# Patient Record
Sex: FEMALE | Race: WHITE | NOT HISPANIC OR LATINO | Employment: OTHER | URBAN - METROPOLITAN AREA
[De-identification: names, ages, dates, MRNs, and addresses within clinical notes are randomized per-mention and may not be internally consistent; named-entity substitution may affect disease eponyms.]

---

## 2024-09-21 ENCOUNTER — HOSPITAL ENCOUNTER (INPATIENT)
Facility: HOSPITAL | Age: 64
DRG: 065 | End: 2024-09-21
Attending: EMERGENCY MEDICINE | Admitting: INTERNAL MEDICINE
Payer: MEDICARE

## 2024-09-21 ENCOUNTER — APPOINTMENT (OUTPATIENT)
Dept: RADIOLOGY | Facility: HOSPITAL | Age: 64
DRG: 065 | End: 2024-09-21
Payer: MEDICARE

## 2024-09-21 ENCOUNTER — APPOINTMENT (OUTPATIENT)
Dept: CARDIOLOGY | Facility: HOSPITAL | Age: 64
DRG: 065 | End: 2024-09-21
Payer: MEDICARE

## 2024-09-21 DIAGNOSIS — G47.00 INSOMNIA, UNSPECIFIED TYPE: ICD-10-CM

## 2024-09-21 DIAGNOSIS — I10 HYPERTENSION, UNSPECIFIED TYPE: ICD-10-CM

## 2024-09-21 DIAGNOSIS — Z16.12 ESBL (EXTENDED SPECTRUM BETA-LACTAMASE) PRODUCING BACTERIA INFECTION: ICD-10-CM

## 2024-09-21 DIAGNOSIS — I63.429 CEREBROVASCULAR ACCIDENT (CVA) DUE TO EMBOLISM OF ANTERIOR CEREBRAL ARTERY, UNSPECIFIED BLOOD VESSEL LATERALITY (MULTI): ICD-10-CM

## 2024-09-21 DIAGNOSIS — N39.0 ACUTE LOWER URINARY TRACT INFECTION: ICD-10-CM

## 2024-09-21 DIAGNOSIS — I63.9 CEREBROVASCULAR ACCIDENT (CVA), UNSPECIFIED MECHANISM (MULTI): Primary | ICD-10-CM

## 2024-09-21 DIAGNOSIS — A49.9 ESBL (EXTENDED SPECTRUM BETA-LACTAMASE) PRODUCING BACTERIA INFECTION: ICD-10-CM

## 2024-09-21 DIAGNOSIS — I67.841 REVERSIBLE CEREBROVASCULAR VASOCONSTRICTION SYNDROME: ICD-10-CM

## 2024-09-21 DIAGNOSIS — E85.89 OTHER AMYLOIDOSIS (MULTI): ICD-10-CM

## 2024-09-21 PROBLEM — T45.1X5A CHEMOTHERAPY-INDUCED DIARRHEA: Status: ACTIVE | Noted: 2023-09-14

## 2024-09-21 PROBLEM — R60.0 BILATERAL LOWER EXTREMITY EDEMA: Status: ACTIVE | Noted: 2023-09-26

## 2024-09-21 PROBLEM — Z86.79 HISTORY OF HYPERTENSION: Status: ACTIVE | Noted: 2022-08-24

## 2024-09-21 PROBLEM — E53.8 B12 DEFICIENCY: Status: ACTIVE | Noted: 2022-03-04

## 2024-09-21 PROBLEM — R63.0 ANOREXIA: Status: ACTIVE | Noted: 2022-08-24

## 2024-09-21 PROBLEM — Z98.84 HISTORY OF GASTRIC BYPASS: Status: ACTIVE | Noted: 2022-02-24

## 2024-09-21 PROBLEM — N18.4 STAGE 4 CHRONIC KIDNEY DISEASE (MULTI): Status: ACTIVE | Noted: 2024-09-21

## 2024-09-21 PROBLEM — R62.7 FAILURE TO THRIVE IN ADULT: Status: ACTIVE | Noted: 2022-08-24

## 2024-09-21 PROBLEM — B25.9 CYTOMEGALOVIRUS (CMV) VIREMIA (MULTI): Status: ACTIVE | Noted: 2023-07-28

## 2024-09-21 PROBLEM — R19.7 DIARRHEA: Status: ACTIVE | Noted: 2022-07-19

## 2024-09-21 PROBLEM — F43.21 GRIEF: Status: ACTIVE | Noted: 2024-01-18

## 2024-09-21 PROBLEM — D84.9 IMMUNODEFICIENCY (MULTI): Status: ACTIVE | Noted: 2023-07-20

## 2024-09-21 PROBLEM — M10.9 GOUT: Status: ACTIVE | Noted: 2022-02-24

## 2024-09-21 PROBLEM — D75.89 BICYTOPENIA: Status: ACTIVE | Noted: 2023-06-21

## 2024-09-21 PROBLEM — R06.00 DYSPNEA: Status: ACTIVE | Noted: 2022-05-12

## 2024-09-21 PROBLEM — K52.9 COLITIS: Status: ACTIVE | Noted: 2023-06-30

## 2024-09-21 PROBLEM — R26.9 GAIT ABNORMALITY: Status: ACTIVE | Noted: 2024-03-14

## 2024-09-21 PROBLEM — I50.32 CHRONIC DIASTOLIC HEART FAILURE: Status: ACTIVE | Noted: 2022-12-28

## 2024-09-21 PROBLEM — Z94.81 AUTOLOGOUS BONE MARROW TRANSPLANTATION STATUS: Status: ACTIVE | Noted: 2023-06-21

## 2024-09-21 PROBLEM — I42.9 CARDIOMYOPATHY: Status: ACTIVE | Noted: 2023-02-27

## 2024-09-21 PROBLEM — K52.1 CHEMOTHERAPY-INDUCED DIARRHEA: Status: ACTIVE | Noted: 2023-09-14

## 2024-09-21 PROBLEM — R41.0 CONFUSION: Status: ACTIVE | Noted: 2022-08-24

## 2024-09-21 PROBLEM — Z85.79 HISTORY OF MULTIPLE MYELOMA: Status: ACTIVE | Noted: 2022-08-24

## 2024-09-21 PROBLEM — F41.9 ANXIETY: Status: ACTIVE | Noted: 2024-09-21

## 2024-09-21 PROBLEM — F32.9 MAJOR DEPRESSIVE DISORDER: Status: ACTIVE | Noted: 2024-09-21

## 2024-09-21 PROBLEM — M79.662 BILATERAL CALF PAIN: Status: ACTIVE | Noted: 2022-05-13

## 2024-09-21 PROBLEM — C90.00 MULTIPLE MYELOMA NOT HAVING ACHIEVED REMISSION (MULTI): Status: ACTIVE | Noted: 2024-09-21

## 2024-09-21 PROBLEM — M79.661 BILATERAL CALF PAIN: Status: ACTIVE | Noted: 2022-05-13

## 2024-09-21 PROBLEM — Z86.19 HISTORY OF CLOSTRIDIOIDES DIFFICILE COLITIS: Status: ACTIVE | Noted: 2023-11-25

## 2024-09-21 PROBLEM — D64.89 ANEMIA DUE TO MULTIPLE MECHANISMS: Status: ACTIVE | Noted: 2023-09-13

## 2024-09-21 PROBLEM — M79.7 FIBROMYALGIA: Status: ACTIVE | Noted: 2024-09-21

## 2024-09-21 LAB
ALBUMIN SERPL BCP-MCNC: 3.5 G/DL (ref 3.4–5)
ALP SERPL-CCNC: 52 U/L (ref 33–136)
ALT SERPL W P-5'-P-CCNC: 12 U/L (ref 7–45)
ANION GAP SERPL CALCULATED.3IONS-SCNC: 12 MMOL/L (ref 10–20)
APPEARANCE UR: CLEAR
AST SERPL W P-5'-P-CCNC: 16 U/L (ref 9–39)
BASOPHILS # BLD AUTO: 0.03 X10*3/UL (ref 0–0.1)
BASOPHILS NFR BLD AUTO: 0.7 %
BILIRUB SERPL-MCNC: 0.4 MG/DL (ref 0–1.2)
BILIRUB UR STRIP.AUTO-MCNC: NEGATIVE MG/DL
BNP SERPL-MCNC: 98 PG/ML (ref 0–99)
BUN SERPL-MCNC: 15 MG/DL (ref 6–23)
CALCIUM SERPL-MCNC: 8.2 MG/DL (ref 8.6–10.3)
CARDIAC TROPONIN I PNL SERPL HS: 4 NG/L (ref 0–13)
CARDIAC TROPONIN I PNL SERPL HS: 4 NG/L (ref 0–13)
CHLORIDE SERPL-SCNC: 107 MMOL/L (ref 98–107)
CHOLEST SERPL-MCNC: 175 MG/DL (ref 0–199)
CHOLESTEROL/HDL RATIO: 3.3
CO2 SERPL-SCNC: 19 MMOL/L (ref 21–32)
COLOR UR: ABNORMAL
CREAT SERPL-MCNC: 2.05 MG/DL (ref 0.5–1.05)
EGFRCR SERPLBLD CKD-EPI 2021: 26 ML/MIN/1.73M*2
EOSINOPHIL # BLD AUTO: 0.02 X10*3/UL (ref 0–0.7)
EOSINOPHIL NFR BLD AUTO: 0.5 %
ERYTHROCYTE [DISTWIDTH] IN BLOOD BY AUTOMATED COUNT: 16.9 % (ref 11.5–14.5)
GLUCOSE BLD MANUAL STRIP-MCNC: 81 MG/DL (ref 74–99)
GLUCOSE SERPL-MCNC: 82 MG/DL (ref 74–99)
GLUCOSE UR STRIP.AUTO-MCNC: NORMAL MG/DL
HCT VFR BLD AUTO: 31.8 % (ref 36–46)
HDLC SERPL-MCNC: 52.6 MG/DL
HGB BLD-MCNC: 10.1 G/DL (ref 12–16)
IMM GRANULOCYTES # BLD AUTO: 0.01 X10*3/UL (ref 0–0.7)
IMM GRANULOCYTES NFR BLD AUTO: 0.2 % (ref 0–0.9)
INR PPP: 1.1 (ref 0.9–1.2)
KETONES UR STRIP.AUTO-MCNC: NEGATIVE MG/DL
LDLC SERPL CALC-MCNC: 98 MG/DL
LEUKOCYTE ESTERASE UR QL STRIP.AUTO: ABNORMAL
LYMPHOCYTES # BLD AUTO: 1.81 X10*3/UL (ref 1.2–4.8)
LYMPHOCYTES NFR BLD AUTO: 43.2 %
MCH RBC QN AUTO: 32 PG (ref 26–34)
MCHC RBC AUTO-ENTMCNC: 31.8 G/DL (ref 32–36)
MCV RBC AUTO: 101 FL (ref 80–100)
MONOCYTES # BLD AUTO: 0.44 X10*3/UL (ref 0.1–1)
MONOCYTES NFR BLD AUTO: 10.5 %
MUCOUS THREADS #/AREA URNS AUTO: ABNORMAL /LPF
NEUTROPHILS # BLD AUTO: 1.88 X10*3/UL (ref 1.2–7.7)
NEUTROPHILS NFR BLD AUTO: 44.9 %
NITRITE UR QL STRIP.AUTO: ABNORMAL
NON HDL CHOLESTEROL: 122 MG/DL (ref 0–149)
NRBC BLD-RTO: 0 /100 WBCS (ref 0–0)
PH UR STRIP.AUTO: 6.5 [PH]
PLATELET # BLD AUTO: 94 X10*3/UL (ref 150–450)
POTASSIUM SERPL-SCNC: 4.1 MMOL/L (ref 3.5–5.3)
PROT SERPL-MCNC: 5.9 G/DL (ref 6.4–8.2)
PROT UR STRIP.AUTO-MCNC: ABNORMAL MG/DL
PROTHROMBIN TIME: 11.3 SECONDS (ref 9.3–12.7)
RBC # BLD AUTO: 3.16 X10*6/UL (ref 4–5.2)
RBC # UR STRIP.AUTO: NEGATIVE /UL
RBC #/AREA URNS AUTO: ABNORMAL /HPF
RBC MORPH BLD: NORMAL
SODIUM SERPL-SCNC: 134 MMOL/L (ref 136–145)
SP GR UR STRIP.AUTO: 1.04
TRIGL SERPL-MCNC: 121 MG/DL (ref 0–149)
UROBILINOGEN UR STRIP.AUTO-MCNC: NORMAL MG/DL
VLDL: 24 MG/DL (ref 0–40)
WBC # BLD AUTO: 4.2 X10*3/UL (ref 4.4–11.3)
WBC #/AREA URNS AUTO: ABNORMAL /HPF
WBC CLUMPS #/AREA URNS AUTO: ABNORMAL /HPF

## 2024-09-21 PROCEDURE — 70450 CT HEAD/BRAIN W/O DYE: CPT | Performed by: RADIOLOGY

## 2024-09-21 PROCEDURE — 83718 ASSAY OF LIPOPROTEIN: CPT | Performed by: NURSE PRACTITIONER

## 2024-09-21 PROCEDURE — 2500000004 HC RX 250 GENERAL PHARMACY W/ HCPCS (ALT 636 FOR OP/ED): Performed by: EMERGENCY MEDICINE

## 2024-09-21 PROCEDURE — 99291 CRITICAL CARE FIRST HOUR: CPT

## 2024-09-21 PROCEDURE — 70450 CT HEAD/BRAIN W/O DYE: CPT | Performed by: STUDENT IN AN ORGANIZED HEALTH CARE EDUCATION/TRAINING PROGRAM

## 2024-09-21 PROCEDURE — 71045 X-RAY EXAM CHEST 1 VIEW: CPT | Performed by: RADIOLOGY

## 2024-09-21 PROCEDURE — 93005 ELECTROCARDIOGRAM TRACING: CPT

## 2024-09-21 PROCEDURE — 82947 ASSAY GLUCOSE BLOOD QUANT: CPT

## 2024-09-21 PROCEDURE — 96365 THER/PROPH/DIAG IV INF INIT: CPT | Mod: 59

## 2024-09-21 PROCEDURE — 70496 CT ANGIOGRAPHY HEAD: CPT | Performed by: RADIOLOGY

## 2024-09-21 PROCEDURE — 70498 CT ANGIOGRAPHY NECK: CPT | Performed by: RADIOLOGY

## 2024-09-21 PROCEDURE — 93010 ELECTROCARDIOGRAM REPORT: CPT | Performed by: INTERNAL MEDICINE

## 2024-09-21 PROCEDURE — 2060000001 HC INTERMEDIATE ICU ROOM DAILY

## 2024-09-21 PROCEDURE — 2550000001 HC RX 255 CONTRASTS: Performed by: EMERGENCY MEDICINE

## 2024-09-21 PROCEDURE — 81001 URINALYSIS AUTO W/SCOPE: CPT | Performed by: EMERGENCY MEDICINE

## 2024-09-21 PROCEDURE — 80053 COMPREHEN METABOLIC PANEL: CPT | Performed by: EMERGENCY MEDICINE

## 2024-09-21 PROCEDURE — 87086 URINE CULTURE/COLONY COUNT: CPT | Mod: TRILAB,WESLAB | Performed by: EMERGENCY MEDICINE

## 2024-09-21 PROCEDURE — 2500000004 HC RX 250 GENERAL PHARMACY W/ HCPCS (ALT 636 FOR OP/ED): Performed by: NURSE PRACTITIONER

## 2024-09-21 PROCEDURE — 70496 CT ANGIOGRAPHY HEAD: CPT

## 2024-09-21 PROCEDURE — 36415 COLL VENOUS BLD VENIPUNCTURE: CPT | Performed by: EMERGENCY MEDICINE

## 2024-09-21 PROCEDURE — 84484 ASSAY OF TROPONIN QUANT: CPT | Performed by: EMERGENCY MEDICINE

## 2024-09-21 PROCEDURE — 85610 PROTHROMBIN TIME: CPT | Performed by: EMERGENCY MEDICINE

## 2024-09-21 PROCEDURE — 85025 COMPLETE CBC W/AUTO DIFF WBC: CPT | Performed by: EMERGENCY MEDICINE

## 2024-09-21 PROCEDURE — 71045 X-RAY EXAM CHEST 1 VIEW: CPT

## 2024-09-21 PROCEDURE — 2500000001 HC RX 250 WO HCPCS SELF ADMINISTERED DRUGS (ALT 637 FOR MEDICARE OP): Performed by: EMERGENCY MEDICINE

## 2024-09-21 PROCEDURE — 70450 CT HEAD/BRAIN W/O DYE: CPT

## 2024-09-21 PROCEDURE — 96360 HYDRATION IV INFUSION INIT: CPT

## 2024-09-21 PROCEDURE — 83880 ASSAY OF NATRIURETIC PEPTIDE: CPT | Performed by: EMERGENCY MEDICINE

## 2024-09-21 RX ORDER — TRAZODONE HYDROCHLORIDE 100 MG/1
1 TABLET ORAL NIGHTLY
Status: ON HOLD | COMMUNITY
Start: 2024-09-09 | End: 2024-12-08

## 2024-09-21 RX ORDER — SODIUM BICARBONATE 650 MG/1
650 TABLET ORAL 2 TIMES DAILY
Status: ON HOLD | COMMUNITY
Start: 2024-08-27

## 2024-09-21 RX ORDER — HYDRALAZINE HYDROCHLORIDE 25 MG/1
25 TABLET, FILM COATED ORAL EVERY 6 HOURS PRN
Status: DISCONTINUED | OUTPATIENT
Start: 2024-09-23 | End: 2024-09-25 | Stop reason: HOSPADM

## 2024-09-21 RX ORDER — ATORVASTATIN CALCIUM 40 MG/1
40 TABLET, FILM COATED ORAL DAILY
Status: DISCONTINUED | OUTPATIENT
Start: 2024-09-21 | End: 2024-09-25 | Stop reason: HOSPADM

## 2024-09-21 RX ORDER — CEFTRIAXONE 1 G/50ML
1 INJECTION, SOLUTION INTRAVENOUS ONCE
Status: COMPLETED | OUTPATIENT
Start: 2024-09-21 | End: 2024-09-21

## 2024-09-21 RX ORDER — CALCIUM CARBONATE 260MG(650)
1 TABLET,CHEWABLE ORAL
Status: ON HOLD | COMMUNITY
Start: 2024-05-28

## 2024-09-21 RX ORDER — TRAZODONE HYDROCHLORIDE 100 MG/1
100 TABLET ORAL NIGHTLY
Status: DISCONTINUED | OUTPATIENT
Start: 2024-09-21 | End: 2024-09-25 | Stop reason: HOSPADM

## 2024-09-21 RX ORDER — HEPARIN SODIUM 5000 [USP'U]/ML
5000 INJECTION, SOLUTION INTRAVENOUS; SUBCUTANEOUS EVERY 8 HOURS
Status: DISCONTINUED | OUTPATIENT
Start: 2024-09-21 | End: 2024-09-21

## 2024-09-21 RX ORDER — CHOLECALCIFEROL (VITAMIN D3) 50 MCG
2000 TABLET ORAL
Status: DISCONTINUED | OUTPATIENT
Start: 2024-09-21 | End: 2024-09-25 | Stop reason: HOSPADM

## 2024-09-21 RX ORDER — LOPERAMIDE HYDROCHLORIDE 2 MG/1
2 CAPSULE ORAL 3 TIMES DAILY PRN
Status: DISCONTINUED | OUTPATIENT
Start: 2024-09-21 | End: 2024-09-22

## 2024-09-21 RX ORDER — ASPIRIN 325 MG
325 TABLET ORAL ONCE
Status: COMPLETED | OUTPATIENT
Start: 2024-09-21 | End: 2024-09-21

## 2024-09-21 RX ORDER — SERTRALINE HYDROCHLORIDE 100 MG/1
100 TABLET, FILM COATED ORAL
Status: DISCONTINUED | OUTPATIENT
Start: 2024-09-21 | End: 2024-09-25 | Stop reason: HOSPADM

## 2024-09-21 RX ORDER — OLANZAPINE 2.5 MG/1
2.5 TABLET ORAL NIGHTLY
Status: DISCONTINUED | OUTPATIENT
Start: 2024-09-21 | End: 2024-09-25 | Stop reason: HOSPADM

## 2024-09-21 RX ORDER — ACYCLOVIR 200 MG/1
200 CAPSULE ORAL 2 TIMES DAILY
Status: DISCONTINUED | OUTPATIENT
Start: 2024-09-21 | End: 2024-09-25 | Stop reason: HOSPADM

## 2024-09-21 RX ORDER — LABETALOL HYDROCHLORIDE 5 MG/ML
10 INJECTION, SOLUTION INTRAVENOUS EVERY 10 MIN PRN
Status: ACTIVE | OUTPATIENT
Start: 2024-09-21 | End: 2024-09-23

## 2024-09-21 RX ORDER — SERTRALINE HYDROCHLORIDE 100 MG/1
100 TABLET, FILM COATED ORAL
Status: ON HOLD | COMMUNITY
Start: 2024-09-20 | End: 2025-09-20

## 2024-09-21 RX ORDER — ACYCLOVIR 200 MG/1
200 CAPSULE ORAL 2 TIMES DAILY
Status: ON HOLD | COMMUNITY
Start: 2024-09-03

## 2024-09-21 RX ORDER — LANOLIN ALCOHOL/MO/W.PET/CERES
1000 CREAM (GRAM) TOPICAL
Status: ON HOLD | COMMUNITY
Start: 2024-07-18

## 2024-09-21 RX ORDER — BUPROPION HYDROCHLORIDE 200 MG/1
200 TABLET, EXTENDED RELEASE ORAL
Status: ON HOLD | COMMUNITY
Start: 2024-09-20 | End: 2025-09-15

## 2024-09-21 RX ORDER — LANOLIN ALCOHOL/MO/W.PET/CERES
1000 CREAM (GRAM) TOPICAL
Status: DISCONTINUED | OUTPATIENT
Start: 2024-09-21 | End: 2024-09-25 | Stop reason: HOSPADM

## 2024-09-21 RX ORDER — SODIUM BICARBONATE 650 MG/1
650 TABLET ORAL 2 TIMES DAILY
Status: DISCONTINUED | OUTPATIENT
Start: 2024-09-21 | End: 2024-09-25 | Stop reason: HOSPADM

## 2024-09-21 RX ORDER — CALCIUM CARBONATE 260MG(650)
260 TABLET,CHEWABLE ORAL
Status: DISCONTINUED | OUTPATIENT
Start: 2024-09-21 | End: 2024-09-21

## 2024-09-21 RX ORDER — LOPERAMIDE HCL 1MG/7.5ML
1 LIQUID (ML) ORAL
Status: ON HOLD | COMMUNITY

## 2024-09-21 RX ORDER — OLANZAPINE 5 MG/1
2.5 TABLET ORAL
Status: ON HOLD | COMMUNITY
Start: 2024-09-16

## 2024-09-21 RX ORDER — SODIUM CHLORIDE 9 MG/ML
100 INJECTION, SOLUTION INTRAVENOUS CONTINUOUS
Status: DISCONTINUED | OUTPATIENT
Start: 2024-09-21 | End: 2024-09-22

## 2024-09-21 RX ORDER — BUPROPION HYDROCHLORIDE 100 MG/1
200 TABLET, EXTENDED RELEASE ORAL DAILY
Status: DISCONTINUED | OUTPATIENT
Start: 2024-09-21 | End: 2024-09-25 | Stop reason: HOSPADM

## 2024-09-21 RX ORDER — ASPIRIN 81 MG/1
81 TABLET ORAL DAILY
Status: DISCONTINUED | OUTPATIENT
Start: 2024-09-22 | End: 2024-09-25 | Stop reason: HOSPADM

## 2024-09-21 RX ORDER — VIT C/E/ZN/COPPR/LUTEIN/ZEAXAN 250MG-90MG
2000 CAPSULE ORAL
Status: ON HOLD | COMMUNITY
Start: 2024-06-20

## 2024-09-21 RX ORDER — HYDRALAZINE HYDROCHLORIDE 20 MG/ML
10 INJECTION INTRAMUSCULAR; INTRAVENOUS
Status: ACTIVE | OUTPATIENT
Start: 2024-09-21 | End: 2024-09-23

## 2024-09-21 RX ADMIN — IOHEXOL 75 ML: 350 INJECTION, SOLUTION INTRAVENOUS at 10:54

## 2024-09-21 RX ADMIN — SODIUM CHLORIDE 100 ML/HR: 900 INJECTION, SOLUTION INTRAVENOUS at 16:17

## 2024-09-21 RX ADMIN — SODIUM CHLORIDE 500 ML: 9 INJECTION, SOLUTION INTRAVENOUS at 10:39

## 2024-09-21 RX ADMIN — ASPIRIN 325 MG ORAL TABLET 325 MG: 325 PILL ORAL at 10:41

## 2024-09-21 RX ADMIN — CEFTRIAXONE SODIUM 1 G: 1 INJECTION, SOLUTION INTRAVENOUS at 13:15

## 2024-09-21 SDOH — SOCIAL STABILITY: SOCIAL INSECURITY: ARE THERE ANY APPARENT SIGNS OF INJURIES/BEHAVIORS THAT COULD BE RELATED TO ABUSE/NEGLECT?: NO

## 2024-09-21 SDOH — SOCIAL STABILITY: SOCIAL INSECURITY: HAVE YOU HAD ANY THOUGHTS OF HARMING ANYONE ELSE?: NO

## 2024-09-21 SDOH — SOCIAL STABILITY: SOCIAL INSECURITY: HAVE YOU HAD THOUGHTS OF HARMING ANYONE ELSE?: NO

## 2024-09-21 SDOH — SOCIAL STABILITY: SOCIAL INSECURITY: DOES ANYONE TRY TO KEEP YOU FROM HAVING/CONTACTING OTHER FRIENDS OR DOING THINGS OUTSIDE YOUR HOME?: NO

## 2024-09-21 SDOH — SOCIAL STABILITY: SOCIAL INSECURITY: ARE YOU OR HAVE YOU BEEN THREATENED OR ABUSED PHYSICALLY, EMOTIONALLY, OR SEXUALLY BY ANYONE?: NO

## 2024-09-21 SDOH — SOCIAL STABILITY: SOCIAL INSECURITY: DO YOU FEEL UNSAFE GOING BACK TO THE PLACE WHERE YOU ARE LIVING?: NO

## 2024-09-21 SDOH — SOCIAL STABILITY: SOCIAL INSECURITY: DO YOU FEEL ANYONE HAS EXPLOITED OR TAKEN ADVANTAGE OF YOU FINANCIALLY OR OF YOUR PERSONAL PROPERTY?: NO

## 2024-09-21 SDOH — SOCIAL STABILITY: SOCIAL INSECURITY: HAS ANYONE EVER THREATENED TO HURT YOUR FAMILY OR YOUR PETS?: NO

## 2024-09-21 SDOH — SOCIAL STABILITY: SOCIAL INSECURITY: ABUSE: ADULT

## 2024-09-21 ASSESSMENT — ACTIVITIES OF DAILY LIVING (ADL)
HEARING - LEFT EAR: FUNCTIONAL
DRESSING YOURSELF: NEEDS ASSISTANCE
ADEQUATE_TO_COMPLETE_ADL: YES
GROOMING: NEEDS ASSISTANCE
HEARING - RIGHT EAR: FUNCTIONAL
ADEQUATE_TO_COMPLETE_ADL: YES
WALKS IN HOME: INDEPENDENT
PATIENT'S MEMORY ADEQUATE TO SAFELY COMPLETE DAILY ACTIVITIES?: YES
TOILETING: NEEDS ASSISTANCE
LACK_OF_TRANSPORTATION: NO
BATHING: NEEDS ASSISTANCE
JUDGMENT_ADEQUATE_SAFELY_COMPLETE_DAILY_ACTIVITIES: YES
JUDGMENT_ADEQUATE_SAFELY_COMPLETE_DAILY_ACTIVITIES: YES
PATIENT'S MEMORY ADEQUATE TO SAFELY COMPLETE DAILY ACTIVITIES?: YES
FEEDING YOURSELF: INDEPENDENT

## 2024-09-21 ASSESSMENT — PATIENT HEALTH QUESTIONNAIRE - PHQ9
2. FEELING DOWN, DEPRESSED OR HOPELESS: NOT AT ALL
SUM OF ALL RESPONSES TO PHQ9 QUESTIONS 1 & 2: 0
1. LITTLE INTEREST OR PLEASURE IN DOING THINGS: NOT AT ALL

## 2024-09-21 ASSESSMENT — PAIN SCALES - GENERAL
PAINLEVEL_OUTOF10: 0 - NO PAIN
PAINLEVEL_OUTOF10: 0 - NO PAIN

## 2024-09-21 ASSESSMENT — COGNITIVE AND FUNCTIONAL STATUS - GENERAL
TURNING FROM BACK TO SIDE WHILE IN FLAT BAD: A LOT
MOVING FROM LYING ON BACK TO SITTING ON SIDE OF FLAT BED WITH BEDRAILS: A LITTLE
DAILY ACTIVITIY SCORE: 14
MOVING FROM LYING ON BACK TO SITTING ON SIDE OF FLAT BED WITH BEDRAILS: A LITTLE
DRESSING REGULAR LOWER BODY CLOTHING: A LOT
DRESSING REGULAR UPPER BODY CLOTHING: A LOT
CLIMB 3 TO 5 STEPS WITH RAILING: TOTAL
PATIENT BASELINE BEDBOUND: NO
MOBILITY SCORE: 11
TOILETING: A LITTLE
TURNING FROM BACK TO SIDE WHILE IN FLAT BAD: A LOT
PERSONAL GROOMING: A LOT
DRESSING REGULAR LOWER BODY CLOTHING: A LOT
STANDING UP FROM CHAIR USING ARMS: A LOT
MOVING TO AND FROM BED TO CHAIR: A LOT
WALKING IN HOSPITAL ROOM: TOTAL
HELP NEEDED FOR BATHING: A LITTLE
PERSONAL GROOMING: A LOT
WALKING IN HOSPITAL ROOM: TOTAL
TOILETING: A LITTLE
DRESSING REGULAR UPPER BODY CLOTHING: A LOT
MOBILITY SCORE: 11
STANDING UP FROM CHAIR USING ARMS: A LOT
DAILY ACTIVITIY SCORE: 13
CLIMB 3 TO 5 STEPS WITH RAILING: TOTAL
MOVING TO AND FROM BED TO CHAIR: A LOT
EATING MEALS: A LOT
EATING MEALS: A LOT
HELP NEEDED FOR BATHING: A LOT

## 2024-09-21 ASSESSMENT — LIFESTYLE VARIABLES
HOW OFTEN DO YOU HAVE A DRINK CONTAINING ALCOHOL: NEVER
HOW OFTEN DO YOU HAVE 6 OR MORE DRINKS ON ONE OCCASION: NEVER
HOW MANY STANDARD DRINKS CONTAINING ALCOHOL DO YOU HAVE ON A TYPICAL DAY: PATIENT DOES NOT DRINK
AUDIT-C TOTAL SCORE: 0
AUDIT-C TOTAL SCORE: 0
SKIP TO QUESTIONS 9-10: 1

## 2024-09-21 ASSESSMENT — PAIN - FUNCTIONAL ASSESSMENT: PAIN_FUNCTIONAL_ASSESSMENT: 0-10

## 2024-09-21 ASSESSMENT — PAIN SCALES - WONG BAKER: WONGBAKER_NUMERICALRESPONSE: NO HURT

## 2024-09-21 ASSESSMENT — COLUMBIA-SUICIDE SEVERITY RATING SCALE - C-SSRS
2. HAVE YOU ACTUALLY HAD ANY THOUGHTS OF KILLING YOURSELF?: NO
6. HAVE YOU EVER DONE ANYTHING, STARTED TO DO ANYTHING, OR PREPARED TO DO ANYTHING TO END YOUR LIFE?: NO
1. IN THE PAST MONTH, HAVE YOU WISHED YOU WERE DEAD OR WISHED YOU COULD GO TO SLEEP AND NOT WAKE UP?: NO

## 2024-09-21 NOTE — PROGRESS NOTES
HPI:  64 y.o. female with right sided weakness. woke up 0800 and noticed right sided weakness and had trouble getting out of bed. EMS called and brought to the ED.     Last known Well: 2100  NIH Stroke Scale Reported: 5 (moderate right face, arm and leg weakness with mild dysarthria)     Prior Functional Status (Modified Danika Scale):  0 No symptoms at all     Imaging Results:  Head CT: no acute findings   Head/Neck CTA/P: no LVO     Assessment:   Working Diagnosis:   Ischemic Stroke       Recommendations:   IV tPA is not recommended. Rationale: outside of wake up thrombolysis protocol time window as she is greater than 12 hours from LKN     Patient is a potential candidate of endovascular treatment      Additional Recommendations:  Activate wake up protocol for MRI     Consult completed by:  TELEPHONE communication was used to provide this telehealth service.  Time includes consultation with ED provider and extensive review of data- history, medical records, test results, and neuroimaging studies: 11 - 20 mins was spent in consultation

## 2024-09-21 NOTE — CARE PLAN
The patient's goals for the shift include      The clinical goals for the shift include monitor NIH

## 2024-09-21 NOTE — PROGRESS NOTES
Attestation/Supervisory note for IMELDA Vasquez      The patient is a 64-year-old female presenting to the emergency department for evaluation of a possible stroke.  The patient states that she went to bed sometime around 2100 last night and did not have any symptoms.  She states that she woke up sometime around 0800 this morning and had slurred speech and right-sided weakness.  She was not able to walk or stand.  She states that she waited for a while to see if it would go away but it did not so she contacted her daughter and EMS was called.  She states that she does not use any blood thinners.  She denies any headache or visual changes.  No difficulty swallowing.  No chest pain or shortness of breath.  No abdominal pain.  No nausea or vomiting.  No diarrhea or constipation.  No urinary complaints.  No history of previous strokes.  She states that she thinks that she was told something was wrong with her heart in the past but she states it was not atrial fibrillation, atrial flutter, or a heart attack.  She does not have any history of PE or DVT.  She denies having any chronic medical conditions.  She does not smoke.  All pertinent positives and negatives are recorded above.  All other systems reviewed and otherwise negative.  Vital signs within normal limits.  Physical exam with a well-nourished well-developed female in no acute distress.  HEENT exam with slight right-sided facial droop.  She is able to converse without difficulty other than a slight slur of her speech.  She has no evidence of respiratory distress.  Abdominal exam is benign.  She does have a right-sided upper and lower extremity weakness.  Her NIH stroke scale score is 5 at this time      Code brain attack activated      tPA/TNK is not indicated at this time, even using the wake-up protocol, due to the patient's last known well time of eating more than 12 hours prior to arrival.  The patient did have an NIH stroke scale score of 5 and was within the 4.5  hours of onset time using the wake-up protocol but had a last known well time of more than 12 hours prior to arrival.      EKG with normal sinus rhythm at 71 bpm, normal axis, normal voltage, normal ST segment, normal T waves      Oral aspirin and IV fluids ordered      Diagnostic labs with evidence of urinary tract infection, mild thrombocytopenia, electrolyte imbalance, mild renal insufficiency, otherwise unremarkable.      Initial troponin 4.  Repeat trop T 4      IV Rocephin was ordered.      CT head wo IV contrast   Final Result   1. Interval evidence of hyperdensity in the falx cerebri and in the   intracranial vasculature as described above is most likely favored to   be related to recent intravenous contrast administered during the CT   angiogram head performed on the same day. Within this limitation,   there is no evidence of intracranial hemorrhage or mass effect or   midline shift.   2. No definite CT evidence of territorial loss of gray-white   differentiation to suggest acute infarction. However an MRI can be   considered for definitive evaluation.             MACRO:   None             Signed by: Blossom Griffith 9/21/2024 12:50 PM   Dictation workstation:   [id number]      XR chest 1 view   Final Result   1. Mild pulmonary vascular congestion without acute abnormality                  MACRO:   None        Signed by: Eric Tiwari 9/21/2024 11:36 AM   Dictation workstation:   [id number]      CT angio head and neck w and wo IV contrast   Final Result   * Normal exam        MACRO:   Murphy Tirado discussed the significance and urgency of this   critical finding by telephone with  DORENE PALACIO on 9/21/2024 at 11:18   am.  (**-RCF-**) Findings:  See findings.             Signed by: Murphy Tirado 9/21/2024 11:18 AM   Dictation workstation:   [id number]      CT brain attack head wo IV contrast   Final Result   1. No acute intracranial abnormality        2. Remote lacunar infarcts in the right  caudate head and left   thalamus.             MACRO:   Reynaldo Crowley discussed the significance and urgency of this   critical finding by telephone with  DORENE PALACIO on 9/21/2024 at 10:57   am.  (**-RCF-**) Findings:  See findings.             Signed by: Reynaldo Crowley 9/21/2024 10:57 AM   Dictation workstation:   [id number]           The patient does not have any evidence of hemodynamic instability.  She does not have any evidence of airway compromise or respiratory distress.  She does have evidence of urinary tract infection but she is well-perfused and there is low suspicion for sepsis.  IV antibiotics were ordered.  EKG and cardiac enzymes do not show any evidence of STEMI.  Chest x-ray with some mild pulmonary congestion concerning for CHF but otherwise unremarkable.  She did not have any widening of the mediastinum.  Her pulses were equal bilaterally.  No evidence of pneumonia or pneumothorax.  The patient did have an NIH stroke scale score of 5 upon arrival.  A code brain attack was activated.  CT head showed no evidence of acute intracranial hemorrhage, mass effect and/or CVA but there were areas of remote lacunar infarcts in the right caudate region and thalamus.  CT angio head and neck showed no large vessel occlusion.  The case was discussed with the telestroke neurologist, Dr. Cox, and we did discuss management with tPA/TNK but she agreed that the patient is outside the window even with the wake-up protocol given that she is more than 12 hours from last known well time and was at the time that she presented to the emergency department.  The patient did have some stuttering of her symptoms along the emergency room with initial worsening of the right upper and lower extremity weakness and worsening slurred speech but then had improvement in her symptoms with her ability to lift her leg.  She did have a return to her baseline presentation of her right upper extremity and but it did have persistent  dysphagia.  I did reengage with telestroke neurology and we repeated the head CT due to her stuttering of symptoms but there was no evidence of acute change per the radiology reading and the neurology reading of the studies.  I did discuss the case in detail with telestroke neurology, the patient and the patient's daughters.  They were all in agreement with the best plan was for admission to the hospital with monitoring for any neurologic changes, treatment with aspirin, and further assessment by neurology.        The patient was admitted for further management of suspected lacunar infarct.        Impression/diagnosis:  1.  CVA/TIA  2.  Hypertension, unspecified  3.  Acute lower urinary tract infection        Critical care time of 38 minutes billed for management of the code brain attack with assessment of the patient with NIH stroke scale, consideration of tPA inclusion and exclusion criteria, consultation with telestroke neurology, consultation with the patient's family members and the patient, initiation of oral aspirin, monitoring of the patient on telemetry and arrangement for admission.  This time excludes time for billable procedures.      critical care time billed for by me is non concurrent with time billed for by IMELDA Vasquez      I personally saw the patient and made/approve the management plan and take responsibility for the patient management.      I independently interpreted the following study (S) EKG and diagnostic labs      I personally discussed the patient's management with the patient, her family members, the admitting provider and neurology      I reviewed the results of the diagnostic labs and diagnostic imaging.  Formal radiology read was completed by the radiologist.      Pricila Collier MD

## 2024-09-21 NOTE — ED PROVIDER NOTES
HPI   No chief complaint on file.      HPI  Patient is a 64-year-old female who presents to ED for evaluation of possible stroke.  Patient states her symptoms started when she woke up this morning.  Went to bed around 2100 last night.  Patient states she woke up around 8 AM this morning and had slurred speech and right-sided weakness.  Patient states she was unable to walk or stand.  Patient states she waited for symptoms to improve and then contacted daughter and EMS.  Dysphagia.  Denies any chest pain or SOB.  Denies abdominal pain or NVD.  Denies any urinary symptoms.  Denies any history of stroke.  Denies any history of chronic illness.  Denies any recent hospitalizations or surgeries.  Denies any recent travel or sick contacts.      Patient History   No past medical history on file.  No past surgical history on file.  No family history on file.  Social History     Tobacco Use    Smoking status: Not on file    Smokeless tobacco: Not on file   Substance Use Topics    Alcohol use: Not on file    Drug use: Not on file       Physical Exam   ED Triage Vitals   Temp Pulse Resp BP   -- -- -- --      SpO2 Temp src Heart Rate Source Patient Position   -- -- -- --      BP Location FiO2 (%)     -- --       Physical Exam  Vitals reviewed.   Constitutional:       Appearance: She is ill-appearing.   HENT:      Head: Normocephalic and atraumatic.   Eyes:      Extraocular Movements: Extraocular movements intact.      Pupils: Pupils are equal, round, and reactive to light.   Cardiovascular:      Rate and Rhythm: Normal rate and regular rhythm.      Heart sounds: Normal heart sounds.   Pulmonary:      Effort: Pulmonary effort is normal.      Breath sounds: Normal breath sounds.   Abdominal:      General: Abdomen is flat.      Palpations: Abdomen is soft.      Tenderness: There is no abdominal tenderness.   Musculoskeletal:         General: Normal range of motion.      Cervical back: Normal range of motion and neck supple.    Skin:     General: Skin is warm and dry.   Neurological:      Mental Status: She is alert.      Motor: Weakness present.      Gait: Gait abnormal.   Psychiatric:         Mood and Affect: Mood normal.         Behavior: Behavior normal.           ED Course & MDM   Diagnoses as of 09/21/24 1556   Cerebrovascular accident (CVA), unspecified mechanism (Multi)   Hypertension, unspecified type   Acute lower urinary tract infection                 No data recorded                                 Medical Decision Making  Parts of this chart have been completed using voice recognition software. Please excuse any errors of transcription.  My thought process and reason for plan has been formulated from the time that I saw the patient until the time of disposition and is not specific to one specific moment during their visit and furthermore my MDM encompasses this entire chart and not only this text box.    HPI:   Detailed above.    Exam:   A medically appropriate exam performed, outlined above, given the known history and presentation.    History obtained from:   Patient    EKG/Cardiac monitor:   Interpreted by attending physician, see their note for ED course for more detail.    Social Determinants of Health considered during this visit:   Housing, Family or social support    Medications given during visit:  Medications   sodium chloride 0.9 % bolus 500 mL (0 mL intravenous Stopped 9/21/24 1158)   aspirin tablet 325 mg (325 mg oral Given 9/21/24 1041)   iohexol (OMNIPaque) 350 mg iodine/mL solution 75 mL (75 mL intravenous Given 9/21/24 1054)   cefTRIAXone (Rocephin) 1 g in dextrose (iso) IV 50 mL (0 g intravenous Stopped 9/21/24 1407)        Diagnostic/tests:  Labs Reviewed   CBC WITH AUTO DIFFERENTIAL - Abnormal       Result Value    WBC 4.2 (*)     nRBC 0.0      RBC 3.16 (*)     Hemoglobin 10.1 (*)     Hematocrit 31.8 (*)      (*)     MCH 32.0      MCHC 31.8 (*)     RDW 16.9 (*)     Platelets 94 (*)     Neutrophils  % 44.9      Immature Granulocytes %, Automated 0.2      Lymphocytes % 43.2      Monocytes % 10.5      Eosinophils % 0.5      Basophils % 0.7      Neutrophils Absolute 1.88      Immature Granulocytes Absolute, Automated 0.01      Lymphocytes Absolute 1.81      Monocytes Absolute 0.44      Eosinophils Absolute 0.02      Basophils Absolute 0.03     COMPREHENSIVE METABOLIC PANEL - Abnormal    Glucose 82      Sodium 134 (*)     Potassium 4.1      Chloride 107      Bicarbonate 19 (*)     Anion Gap 12      Urea Nitrogen 15      Creatinine 2.05 (*)     eGFR 26 (*)     Calcium 8.2 (*)     Albumin 3.5      Alkaline Phosphatase 52      Total Protein 5.9 (*)     AST 16      Bilirubin, Total 0.4      ALT 12     URINALYSIS WITH REFLEX CULTURE AND MICROSCOPIC - Abnormal    Color, Urine Light-Yellow      Appearance, Urine Clear      Specific Gravity, Urine 1.036 (*)     pH, Urine 6.5      Protein, Urine 10 (TRACE)      Glucose, Urine Normal      Blood, Urine NEGATIVE      Ketones, Urine NEGATIVE      Bilirubin, Urine NEGATIVE      Urobilinogen, Urine Normal      Nitrite, Urine 2+ (*)     Leukocyte Esterase, Urine 250 Fior/µL (*)    MICROSCOPIC ONLY, URINE - Abnormal    WBC, Urine 21-50 (*)     WBC Clumps, Urine OCCASIONAL      RBC, Urine 1-2      Mucus, Urine FEW     PROTIME-INR - Normal    Protime 11.3      INR 1.1      Narrative:     INR Therapeutic Range: 2.0-3.5   B-TYPE NATRIURETIC PEPTIDE - Normal    BNP 98      Narrative:        <100 pg/mL - Heart failure unlikely  100-299 pg/mL - Intermediate probability of acute heart                  failure exacerbation. Correlate with clinical                  context and patient history.    >=300 pg/mL - Heart Failure likely. Correlate with clinical                  context and patient history.    BNP testing is performed using different testing methodology at [facility] than at other [facility]. Direct result comparisons should only be made within the same method.       SERIAL TROPONIN-INITIAL - Normal    Troponin I, High Sensitivity 4      Narrative:     Less than 99th percentile of normal range cutoff-  Female and children under 18 years old <14 ng/L; Male <21 ng/L: Negative  Repeat testing should be performed if clinically indicated.     Female and children under 18 years old 14-50 ng/L; Male 21-50 ng/L:  Consistent with possible cardiac damage and possible increased clinical   risk. Serial measurements may help to assess extent of myocardial damage.     >50 ng/L: Consistent with cardiac damage, increased clinical risk and  myocardial infarction. Serial measurements may help assess extent of   myocardial damage.      NOTE: Children less than 1 year old may have higher baseline troponin   levels and results should be interpreted in conjunction with the overall   clinical context.     NOTE: Troponin I testing is performed using a different   testing methodology at [facility] than at other   [facility]. Direct result comparisons should only   be made within the same method.   SERIAL TROPONIN, 1 HOUR - Normal    Troponin I, High Sensitivity 4      Narrative:     Less than 99th percentile of normal range cutoff-  Female and children under 18 years old <14 ng/L; Male <21 ng/L: Negative  Repeat testing should be performed if clinically indicated.     Female and children under 18 years old 14-50 ng/L; Male 21-50 ng/L:  Consistent with possible cardiac damage and possible increased clinical   risk. Serial measurements may help to assess extent of myocardial damage.     >50 ng/L: Consistent with cardiac damage, increased clinical risk and  myocardial infarction. Serial measurements may help assess extent of   myocardial damage.      NOTE: Children less than 1 year old may have higher baseline troponin   levels and results should be interpreted in conjunction with the overall   clinical context.     NOTE: Troponin I testing is performed using a different   testing  methodology at [facility] than at other   [facility]. Direct result comparisons should only   be made within the same method.   POCT GLUCOSE - Normal    POCT Glucose 81     URINE CULTURE   TROPONIN SERIES- (INITIAL, 1 HR)    Narrative:     The following orders were created for panel order Troponin I Series, High Sensitivity (0, 1 HR).  Procedure                               Abnormality         Status                     ---------                               -----------         ------                     Troponin I, High Sensiti...[[id number]]  Normal              Final result               Troponin, High Sensitivi...[[id number]]  Normal              Final result                 Please view results for these tests on the individual orders.   URINALYSIS WITH REFLEX CULTURE AND MICROSCOPIC    Narrative:     The following orders were created for panel order Urinalysis with Reflex Culture and Microscopic.  Procedure                               Abnormality         Status                     ---------                               -----------         ------                     Urinalysis with Reflex C...[[id number]]  Abnormal            Final result               Extra Urine Gray Tube[[id number]]                                                         Please view results for these tests on the individual orders.   EXTRA URINE GRAY TUBE   POCT GLUCOSE METER   MORPHOLOGY    RBC Morphology No significant RBC morphology present        CT head wo IV contrast   Final Result   1. Interval evidence of hyperdensity in the falx cerebri and in the   intracranial vasculature as described above is most likely favored to   be related to recent intravenous contrast administered during the CT   angiogram head performed on the same day. Within this limitation,   there is no evidence of intracranial hemorrhage or mass effect or   midline shift.   2. No definite CT evidence of territorial loss of gray-white    differentiation to suggest acute infarction. However an MRI can be   considered for definitive evaluation.             MACRO:   None             Signed by: Blossom Griffith 9/21/2024 12:50 PM   Dictation workstation:   [id number]      XR chest 1 view   Final Result   1. Mild pulmonary vascular congestion without acute abnormality                  MACRO:   None        Signed by: Eric Tiwari 9/21/2024 11:36 AM   Dictation workstation:   [id number]      CT angio head and neck w and wo IV contrast   Final Result   * Normal exam        MACRO:   Murphy Tirado discussed the significance and urgency of this   critical finding by telephone with  DORENEANGEL LUIS PALACIO on 9/21/2024 at 11:18   am.  (**-RCF-**) Findings:  See findings.             Signed by: Murphy Tirado 9/21/2024 11:18 AM   Dictation workstation:   [id number]      CT brain attack head wo IV contrast   Final Result   1. No acute intracranial abnormality        2. Remote lacunar infarcts in the right caudate head and left   thalamus.             MACRO:   Reynaldo Crowley discussed the significance and urgency of this   critical finding by telephone with  DORENE HAKEEM on 9/21/2024 at 10:57   am.  (**-RCF-**) Findings:  See findings.             Signed by: Reynaldo Crowley 9/21/2024 10:57 AM   Dictation workstation:   [id number]           Critical Care:  Upon my evaluation, this patient had a high probability of imminent or life-threatening deterioration due to stroke, which required my direct attention, intervention, and personal management.    I have personally provided 30 minutes of non-concurrent critical care time exclusive of time spent on separately billable procedures. Time includes review of laboratory data, radiology results, discussion with consultants, and monitoring for potential decompensation. Interventions were performed as documented above.    MDM Summary:  Patient has symptoms concerning for a severe stroke.  Lab work is remarkable for  evidence of urinary tract infection.  Imaging studies are otherwise negative today.  Stroke neurology at [facility] was consulted.  Per their recommendations, patient was admitted to the inpatient service by the attending physician Dr. Collier for further stroke workup and neuroimaging.      Procedure  Procedures     Eliud Vasquez PA-C  09/21/24 1604

## 2024-09-21 NOTE — ASSESSMENT & PLAN NOTE
Patient on chemo having a lot of nausea and diarrhea from chemo feeling weak due to her immunosuppressive  On Zofran as needed

## 2024-09-21 NOTE — H&P
History Of Present Illness  Lisbeth Cruz is a 64 y.o. female presenting with stroke like symptoms  This is a 64 years old with past medical history of multiple myeloma chemo therapy,  CKD stage IV, amyloidosis;  who presented to ED due to right sided weaknesses and having trouble to stand up.  Per patient all happened this morning around 9 am;. initially feeling weak on her right leg, then right arm weakness.   At the arrival at ED she was having slurred speech and had right facial droop.  She stated that before going to bed she was fine.  She denies any swallowing issues no headache or visual changes.  She denies any chest pain or palpitation.  Not on any blood thinner.  She denies nausea, vomiting or abdominal pain.  Admitted that she had chronic diarrhea due to chemo and amyloidosis; and takes Imodium daily for prevention.  History of A-fib, PE or DVT not on any blood thinner.  She denies any fall.  She said she was on chemo makes her very weak and lying in her bed most of the time.  Denies any shortness of breath in room air.    Her NIH score is 3(slurred speech, droop right facial, and right arm weakness            Past Medical History  Past Medical History:   Diagnosis Date    Anxiety 09/21/2024    History of gastric bypass 02/24/2022    Remote history (1988) but has had chronic nausea since      Insomnia 09/21/2024    Major depressive disorder 09/21/2024    Multiple myeloma not having achieved remission (Multi) 09/21/2024    Other amyloidosis (Multi) 09/21/2024    Primary hypertension 09/21/2024    Renal mass     Stage 4 chronic kidney disease (Multi) 09/21/2024       Surgical History  Past Surgical History:   Procedure Laterality Date    CHOLECYSTECTOMY      HIP SURGERY Right     HYSTERECTOMY      IR CVC PICC  07/10/2023    IR CVC PICC        Social History  She reports that she quit smoking about 35 years ago. Her smoking use included cigarettes. She has never used smokeless tobacco. No history on file  "for alcohol use and drug use.    Family History  Family History   Problem Relation Name Age of Onset    Other (old age) Mother  92    Other (HF) Father  85        Allergies  Compazine [prochlorperazine]    Review of Systems     Physical Exam     Last Recorded Vitals  Blood pressure 155/81, pulse 68, temperature 36.9 °C (98.4 °F), temperature source Temporal, resp. rate 16, height 1.549 m (5' 1\"), weight 90.7 kg (200 lb), SpO2 100%.    Relevant Results             Assessment/Plan   Assessment & Plan  Cerebrovascular accident (CVA), unspecified mechanism (Multi)  Right facial droop dysarthria and right arm weakness  NIH Score at the ED was 5 for me was 3  CT of the head no acute finding  Order MRI consult neuro  PT OT speech therapy    Multiple myeloma not having achieved remission (Multi)  Patient on chemo having a lot of nausea and diarrhea from chemo feeling weak due to her immunosuppressive  On Zofran as needed  Primary hypertension  Not on any medication well-controlled with diet  Major depressive disorder  Resume Zoloft when patient passed her swallowing eval  Anxiety  Continue with the same regimen  Other amyloidosis (Multi)  Patient was having diarrhea and taking Imodium daily to control it  Stage 4 chronic kidney disease (Multi)  Monitor kidney function  Resume bicarb when she passed her swallowing  Insomnia  Will continue with trazodone    Dvt   On heparin monitor platelet closely was low       I spent 60 minutes in the professional and overall care of this patient.      Batool Badillo, APRN-CNP    "

## 2024-09-22 ENCOUNTER — APPOINTMENT (OUTPATIENT)
Dept: RADIOLOGY | Facility: HOSPITAL | Age: 64
DRG: 065 | End: 2024-09-22
Payer: MEDICARE

## 2024-09-22 VITALS
WEIGHT: 128.53 LBS | TEMPERATURE: 98.1 F | SYSTOLIC BLOOD PRESSURE: 157 MMHG | DIASTOLIC BLOOD PRESSURE: 86 MMHG | HEIGHT: 61 IN | HEART RATE: 76 BPM | RESPIRATION RATE: 18 BRPM | OXYGEN SATURATION: 99 % | BODY MASS INDEX: 24.27 KG/M2

## 2024-09-22 LAB
ANION GAP SERPL CALCULATED.3IONS-SCNC: 11 MMOL/L (ref 10–20)
BASOPHILS # BLD AUTO: 0.02 X10*3/UL (ref 0–0.1)
BASOPHILS NFR BLD AUTO: 0.4 %
BUN SERPL-MCNC: 16 MG/DL (ref 6–23)
CALCIUM SERPL-MCNC: 7.8 MG/DL (ref 8.6–10.3)
CHLORIDE SERPL-SCNC: 111 MMOL/L (ref 98–107)
CO2 SERPL-SCNC: 18 MMOL/L (ref 21–32)
CREAT SERPL-MCNC: 1.75 MG/DL (ref 0.5–1.05)
EGFRCR SERPLBLD CKD-EPI 2021: 32 ML/MIN/1.73M*2
EOSINOPHIL # BLD AUTO: 0.01 X10*3/UL (ref 0–0.7)
EOSINOPHIL NFR BLD AUTO: 0.2 %
ERYTHROCYTE [DISTWIDTH] IN BLOOD BY AUTOMATED COUNT: 16.7 % (ref 11.5–14.5)
GLUCOSE SERPL-MCNC: 72 MG/DL (ref 74–99)
HCT VFR BLD AUTO: 29.1 % (ref 36–46)
HGB BLD-MCNC: 9.3 G/DL (ref 12–16)
IMM GRANULOCYTES # BLD AUTO: 0.01 X10*3/UL (ref 0–0.7)
IMM GRANULOCYTES NFR BLD AUTO: 0.2 % (ref 0–0.9)
LYMPHOCYTES # BLD AUTO: 1.61 X10*3/UL (ref 1.2–4.8)
LYMPHOCYTES NFR BLD AUTO: 34.5 %
MCH RBC QN AUTO: 32.1 PG (ref 26–34)
MCHC RBC AUTO-ENTMCNC: 32 G/DL (ref 32–36)
MCV RBC AUTO: 100 FL (ref 80–100)
MONOCYTES # BLD AUTO: 0.46 X10*3/UL (ref 0.1–1)
MONOCYTES NFR BLD AUTO: 9.9 %
NEUTROPHILS # BLD AUTO: 2.56 X10*3/UL (ref 1.2–7.7)
NEUTROPHILS NFR BLD AUTO: 54.8 %
NRBC BLD-RTO: 0 /100 WBCS (ref 0–0)
PLATELET # BLD AUTO: 76 X10*3/UL (ref 150–450)
POTASSIUM SERPL-SCNC: 4.1 MMOL/L (ref 3.5–5.3)
RBC # BLD AUTO: 2.9 X10*6/UL (ref 4–5.2)
SODIUM SERPL-SCNC: 136 MMOL/L (ref 136–145)
WBC # BLD AUTO: 4.7 X10*3/UL (ref 4.4–11.3)

## 2024-09-22 PROCEDURE — 2060000001 HC INTERMEDIATE ICU ROOM DAILY

## 2024-09-22 PROCEDURE — 94760 N-INVAS EAR/PLS OXIMETRY 1: CPT

## 2024-09-22 PROCEDURE — 85025 COMPLETE CBC W/AUTO DIFF WBC: CPT | Performed by: NURSE PRACTITIONER

## 2024-09-22 PROCEDURE — 2500000001 HC RX 250 WO HCPCS SELF ADMINISTERED DRUGS (ALT 637 FOR MEDICARE OP): Performed by: NURSE PRACTITIONER

## 2024-09-22 PROCEDURE — 99223 1ST HOSP IP/OBS HIGH 75: CPT | Performed by: STUDENT IN AN ORGANIZED HEALTH CARE EDUCATION/TRAINING PROGRAM

## 2024-09-22 PROCEDURE — 99232 SBSQ HOSP IP/OBS MODERATE 35: CPT | Performed by: NURSE PRACTITIONER

## 2024-09-22 PROCEDURE — 2500000004 HC RX 250 GENERAL PHARMACY W/ HCPCS (ALT 636 FOR OP/ED): Performed by: NURSE PRACTITIONER

## 2024-09-22 PROCEDURE — 82374 ASSAY BLOOD CARBON DIOXIDE: CPT | Performed by: NURSE PRACTITIONER

## 2024-09-22 PROCEDURE — 70551 MRI BRAIN STEM W/O DYE: CPT

## 2024-09-22 PROCEDURE — 70551 MRI BRAIN STEM W/O DYE: CPT | Performed by: RADIOLOGY

## 2024-09-22 RX ORDER — CEFTRIAXONE 1 G/50ML
1 INJECTION, SOLUTION INTRAVENOUS EVERY 24 HOURS
Status: DISCONTINUED | OUTPATIENT
Start: 2024-09-22 | End: 2024-09-25

## 2024-09-22 RX ADMIN — TRAZODONE HYDROCHLORIDE 100 MG: 100 TABLET ORAL at 21:54

## 2024-09-22 RX ADMIN — ATORVASTATIN CALCIUM 40 MG: 40 TABLET, FILM COATED ORAL at 09:45

## 2024-09-22 RX ADMIN — ACYCLOVIR 200 MG: 200 CAPSULE ORAL at 21:54

## 2024-09-22 RX ADMIN — SODIUM BICARBONATE 650 MG TABLET 650 MG: at 21:54

## 2024-09-22 RX ADMIN — SODIUM CHLORIDE 100 ML/HR: 900 INJECTION, SOLUTION INTRAVENOUS at 01:58

## 2024-09-22 RX ADMIN — CEFTRIAXONE SODIUM 1 G: 1 INJECTION, SOLUTION INTRAVENOUS at 09:45

## 2024-09-22 RX ADMIN — ASPIRIN 81 MG: 81 TABLET, COATED ORAL at 09:45

## 2024-09-22 ASSESSMENT — COGNITIVE AND FUNCTIONAL STATUS - GENERAL
DRESSING REGULAR UPPER BODY CLOTHING: A LOT
HELP NEEDED FOR BATHING: A LITTLE
PERSONAL GROOMING: A LOT
STANDING UP FROM CHAIR USING ARMS: A LITTLE
MOVING TO AND FROM BED TO CHAIR: A LITTLE
CLIMB 3 TO 5 STEPS WITH RAILING: TOTAL
TOILETING: A LITTLE
MOVING FROM LYING ON BACK TO SITTING ON SIDE OF FLAT BED WITH BEDRAILS: A LITTLE
CLIMB 3 TO 5 STEPS WITH RAILING: A LITTLE
HELP NEEDED FOR BATHING: A LOT
TOILETING: A LITTLE
STANDING UP FROM CHAIR USING ARMS: A LOT
PERSONAL GROOMING: A LITTLE
EATING MEALS: A LOT
MOBILITY SCORE: 11
DAILY ACTIVITIY SCORE: 19
WALKING IN HOSPITAL ROOM: TOTAL
TURNING FROM BACK TO SIDE WHILE IN FLAT BAD: A LOT
WALKING IN HOSPITAL ROOM: A LITTLE
DRESSING REGULAR LOWER BODY CLOTHING: A LOT
MOBILITY SCORE: 19
DAILY ACTIVITIY SCORE: 13
TURNING FROM BACK TO SIDE WHILE IN FLAT BAD: A LITTLE
DRESSING REGULAR UPPER BODY CLOTHING: A LITTLE
DRESSING REGULAR LOWER BODY CLOTHING: A LITTLE
MOVING TO AND FROM BED TO CHAIR: A LOT

## 2024-09-22 ASSESSMENT — PAIN SCALES - WONG BAKER: WONGBAKER_NUMERICALRESPONSE: NO HURT

## 2024-09-22 ASSESSMENT — PAIN SCALES - GENERAL: PAINLEVEL_OUTOF10: 0 - NO PAIN

## 2024-09-22 NOTE — CARE PLAN
The patient's goals for the shift include      The clinical goals for the shift include neuro checks, safety

## 2024-09-22 NOTE — CONSULTS
HPI: Telestroke consult at [facility]  64 y.o. female with hx of afib not on AC R sided weakness and trouble getting out of bed.     Last known Well: 2100 the night prior to presentation   NIH Stroke Scale Reported: 5 -- now improving     Pt woke up with sx, and sx stuttered (RUE weakness and difficulty with speech, intact understanding and content was making sense, just dysarthric). Stuttering of sx improved today. Not completely back to herself however.     Prior Functional Status (Modified Danika Scale):  0 No symptoms at all    Review of imaging, echocardiogram, labs and other data:   MRI: pending   Vessel imaging:  no significant stenosis intracranially or extracranially   Echo: pending   LDL:   98   A1c:   pending   BMI: 24.29   Tobacco use?: former smoker   Current antithrombotics:  none -- aspirin started      Patient Active Problem List    Diagnosis Date Noted    Primary hypertension 09/21/2024    Major depressive disorder 09/21/2024    Anxiety 09/21/2024    Other amyloidosis (Multi) 09/21/2024    Stage 4 chronic kidney disease (Multi) 09/21/2024    Insomnia 09/21/2024    Fibromyalgia 09/21/2024    Gait abnormality 03/14/2024    Grief 01/18/2024    History of Clostridioides difficile colitis 11/25/2023    Bilateral lower extremity edema 09/26/2023    Chemotherapy-induced diarrhea 09/14/2023    Anemia due to multiple mechanisms 09/13/2023    Cytomegalovirus (CMV) viremia (Multi) 07/28/2023    Immunodeficiency (Multi) 07/20/2023    Colitis 06/30/2023    Autologous bone marrow transplantation status (Multi) 06/21/2023    Bicytopenia 06/21/2023    Cardiomyopathy (Multi) 02/27/2023    Chronic diastolic heart failure (Multi) 12/28/2022    Anorexia 08/24/2022    Confusion 08/24/2022    Failure to thrive in adult 08/24/2022    History of hypertension 08/24/2022    History of multiple myeloma 08/24/2022    Diarrhea 07/19/2022    Bilateral calf pain 05/13/2022    Dyspnea 05/12/2022    B12 deficiency 03/04/2022    Gout  02/24/2022    History of gastric bypass 02/24/2022     Current Facility-Administered Medications   Medication Dose Route Frequency Provider Last Rate Last Admin    acyclovir (Zovirax) capsule 200 mg  200 mg oral BID LINDA Tran        aspirin EC tablet 81 mg  81 mg oral Daily Batool Zrikem, APRN-CNP   81 mg at 09/22/24 0945    atorvastatin (Lipitor) tablet 40 mg  40 mg oral Daily Batool Zrikem, HUSSEIN-CNP   40 mg at 09/22/24 0945    [Held by provider] buPROPion SR (Wellbutrin SR) 12 hr tablet 200 mg  200 mg oral Daily Batool Zrikem, APRN-SHASTA        cefTRIAXone (Rocephin) 1 g in dextrose (iso) IV 50 mL  1 g intravenous q24h LINDA Tran   Stopped at 09/22/24 1014    cholecalciferol (Vitamin D-3) tablet 2,000 Units  2,000 Units oral Daily LINDA Tran        cyanocobalamin (Vitamin B-12) tablet 1,000 mcg  1,000 mcg oral Daily LINDA Tran        hydrALAZINE (Apresoline) injection 10 mg  10 mg intravenous q20 min PRN Batool Zrikem, HUSSEIN-CNP        Followed by    [START ON 9/23/2024] hydrALAZINE (Apresoline) tablet 25 mg  25 mg oral q6h PRN Batool Zrikem, APRN-CNP        labetaloL (Normodyne,Trandate) injection 10 mg  10 mg intravenous q10 min PRN Batool Zrikem, LINDA        [Held by provider] OLANZapine (ZyPREXA) tablet 2.5 mg  2.5 mg oral Nightly Batool Zrikem, APRN-CNP        oxygen (O2) therapy   inhalation Continuous PRN - O2/gases Batool Zrikem, LINDA        [Held by provider] sertraline (Zoloft) tablet 100 mg  100 mg oral Daily Batool Zrikem, APRN-CNP        sodium bicarbonate tablet 650 mg  650 mg oral BID LINDA Tran        traZODone (Desyrel) tablet 100 mg  100 mg oral Nightly LINDA Tran         Allergies: Compazine [prochlorperazine]  Past Medical History:   Diagnosis Date    Anxiety 09/21/2024    History of gastric bypass 02/24/2022    Remote history (1988) but has had chronic nausea since      Insomnia 09/21/2024    Major  depressive disorder 2024    Multiple myeloma not having achieved remission (Multi) 2024    Other amyloidosis (Multi) 2024    Primary hypertension 2024    Renal mass     Stage 4 chronic kidney disease (Multi) 2024     Past Surgical History:   Procedure Laterality Date    CHOLECYSTECTOMY      HIP SURGERY Right     HYSTERECTOMY      IR CVC PICC  07/10/2023    IR CVC PICC     Family History   Problem Relation Name Age of Onset    Other (old age) Mother  92    Other (HF) Father  85     Social History     Tobacco Use    Smoking status: Former     Current packs/day: 0.00     Types: Cigarettes     Quit date:      Years since quittin.7    Smokeless tobacco: Never   Substance Use Topics    Alcohol use: Not on file       Exam:  Vitals:    24 1201   BP: 134/81   Pulse: 73   Resp: 14   Temp: 35.6 °C (96.1 °F)   SpO2: 100%     Virtual, limited exam performed      NIHSS    Imaging Results:  MRI: No MRI head results found for the past 14 days  CT Head: CT head wo IV contrast    Result Date: 2024  1. Interval evidence of hyperdensity in the falx cerebri and in the intracranial vasculature as described above is most likely favored to be related to recent intravenous contrast administered during the CT angiogram head performed on the same day. Within this limitation, there is no evidence of intracranial hemorrhage or mass effect or midline shift. 2. No definite CT evidence of territorial loss of gray-white differentiation to suggest acute infarction. However an MRI can be considered for definitive evaluation.     MACRO: None     Signed by: Blossom Griffith 2024 12:50 PM Dictation workstation:   [id number]   Echo: No echocardiogram results found for the past 14 days    ASSESSMENT:  Lisbeth Cruz is a 64 y.o. female with hx of afib not on AC R sided weakness and trouble getting out of bed, stuttering sx throughout the day of presentation, now improved somewhat. Given hx of afib  would need stroke evaluation. MRI is pending.       Diagnosis:   R/o ischemic stroke      RECOMMENDATIONS:   - await MRI brain   - continue aspirin for now while undergoing stroke eval  - A1c, echo pending   - neurochecks q4h and telemonitoring   - PT/OT/SLP as appropriate        Consult completed by: TELEPHONE and VIDEO interactive communication was used to provide this telehealth service. Time includes consultation with ED provider and extensive review of data- history, medical records, lab/radiology/medical test results, neuroimaging studies:  70 minutes was spent in INITIAL telehealth consultation

## 2024-09-22 NOTE — PROGRESS NOTES
"Lisbeth Cruz is a 64 y.o. female on day 1 of admission presenting with Cerebrovascular accident (CVA), unspecified mechanism (Multi).    Subjective   Pt seen and examined.  No documented concerns/events overnight from nursing from nursing.  Afebrile overnight.  Denies chills.  Denies any hematuria, dysuria.  No complaints voiced.         Objective     Physical Exam  Constitutional:       General: She is not in acute distress.     Appearance: She is not toxic-appearing.   Cardiovascular:      Rate and Rhythm: Normal rate and regular rhythm.      Pulses: Normal pulses.      Heart sounds: Normal heart sounds.   Pulmonary:      Effort: Pulmonary effort is normal. No respiratory distress.      Breath sounds: Normal breath sounds.   Abdominal:      General: Bowel sounds are normal.      Palpations: Abdomen is soft.   Musculoskeletal:      Right lower leg: No edema.      Left lower leg: No edema.   Skin:     General: Skin is warm and dry.   Neurological:      Mental Status: She is alert. She is disoriented.      Cranial Nerves: Dysarthria and facial asymmetry present.      Comments: Mild RT side facial droop, pt feels speech is slurred  She is slightly confused upon exam, she is able to tell me she is in the hospital and events leading up to ED visit.  She tells me the month is April and year is 1933.  She is not able to tell me who the president is    Psychiatric:         Mood and Affect: Mood normal.         Behavior: Behavior normal.         Last Recorded Vitals  Blood pressure 145/85, pulse 81, temperature 36.9 °C (98.4 °F), temperature source Temporal, resp. rate 17, height 1.549 m (5' 1\"), weight 89 kg (196 lb 3.4 oz), SpO2 100%.  Intake/Output last 3 Shifts:  No intake/output data recorded.    Relevant Results    Scheduled medications  [Held by provider] acyclovir, 200 mg, oral, BID  aspirin, 81 mg, oral, Daily  atorvastatin, 40 mg, oral, Daily  [Held by provider] buPROPion SR, 200 mg, oral, Daily  [Held by " provider] cholecalciferol, 2,000 Units, oral, Daily  [Held by provider] cyanocobalamin, 1,000 mcg, oral, Daily  [Held by provider] OLANZapine, 2.5 mg, oral, Nightly  [Held by provider] sertraline, 100 mg, oral, Daily  [Held by provider] sodium bicarbonate, 650 mg, oral, BID  [Held by provider] traZODone, 100 mg, oral, Nightly      Continuous medications  sodium chloride 0.9%, 100 mL/hr, Last Rate: 100 mL/hr (09/22/24 0158)      PRN medications  PRN medications: hydrALAZINE **FOLLOWED BY** [START ON 9/23/2024] hydrALAZINE, labetaloL, [Held by provider] loperamide, oxygen     following data reviewed    WBC-4.7  Hgb-9.3  Hct-29.1  Platelets-76    AST-  ALT-   Alk Phos-  Total [place]-    Na-136  K+-4.1  Cl-111  Bicarb-18  BUN-16  creatinine-1.7    Urinalysis + leukocyte esterase, +nitrate               This patient has a central line   Reason for the central line remaining today? Parenteral medication                 Assessment/Plan     UTI  -IV Ceftriaxone  -urine cx pending    AMS  -unsure what baseline is  -possibly multifactorial (chemo, UTI)      Slurred Speech/Rt Facial droop/RUE weakness  -per nurse passed bedside swallow eval  -ST, PT,OT  -CT brain no acute findings  -neurology consult   -neuro checks  -fall precautions  -ECHO, MRI    Multiple Myeloma  -on chemotherapy last tx 9/19/24 @ [facility]    Metabolic Acidosis/CKD IV  -renally dose medications  -hold nephrotoxic agents  -resume home bicarb  -monitor BMP    HTN  -not on meds at home  -hydralazine PRN  -monitor blood pressure, 138/81    Depression/Anxiety  -resume meds when mentation improved    Insomnia  -continue trazadone    Thrombocytopenia  -No anticoagulation  -SCDs    Plan  Above plan discussed with pt and she is in agreement  Updated daughter,  Nancy Daly ANGEL LUIS Antoine, APRN-CNP

## 2024-09-22 NOTE — ASSESSMENT & PLAN NOTE
Right facial droop dysarthria and right arm weakness  NIH Score at the ED was 5 for me was 3  CT of the head no acute finding  Order MRI consult neuro  PT OT speech therapy

## 2024-09-23 ENCOUNTER — APPOINTMENT (OUTPATIENT)
Dept: RADIOLOGY | Facility: HOSPITAL | Age: 64
DRG: 065 | End: 2024-09-23
Payer: MEDICARE

## 2024-09-23 ENCOUNTER — APPOINTMENT (OUTPATIENT)
Dept: CARDIOLOGY | Facility: HOSPITAL | Age: 64
DRG: 065 | End: 2024-09-23
Payer: MEDICARE

## 2024-09-23 PROBLEM — I63.9 CVA (CEREBRAL VASCULAR ACCIDENT) (MULTI): Status: ACTIVE | Noted: 2024-09-23

## 2024-09-23 LAB
AORTIC VALVE MEAN GRADIENT: 5 MMHG
AORTIC VALVE PEAK VELOCITY: 1.67 M/S
ATRIAL RATE: 71 BPM
AV PEAK GRADIENT: 11.2 MMHG
AVA (PEAK VEL): 2.46 CM2
AVA (VTI): 2.51 CM2
EJECTION FRACTION APICAL 4 CHAMBER: 61.3
EJECTION FRACTION: 63 %
LEFT ATRIUM VOLUME AREA LENGTH INDEX BSA: 41 ML/M2
LEFT VENTRICLE INTERNAL DIMENSION DIASTOLE: 4.24 CM (ref 3.5–6)
LEFT VENTRICULAR OUTFLOW TRACT DIAMETER: 2 CM
LV EJECTION FRACTION BIPLANE: 55 %
MITRAL VALVE E/A RATIO: 0.61
P AXIS: 53 DEGREES
P OFFSET: 191 MS
P ONSET: 135 MS
PR INTERVAL: 168 MS
Q ONSET: 219 MS
QRS COUNT: 12 BEATS
QRS DURATION: 94 MS
QT INTERVAL: 400 MS
QTC CALCULATION(BAZETT): 434 MS
QTC FREDERICIA: 423 MS
R AXIS: -11 DEGREES
RIGHT VENTRICLE FREE WALL PEAK S': 7.07 CM/S
RIGHT VENTRICLE PEAK SYSTOLIC PRESSURE: 28.4 MMHG
T AXIS: 68 DEGREES
T OFFSET: 419 MS
TRICUSPID ANNULAR PLANE SYSTOLIC EXCURSION: 2.4 CM
VENTRICULAR RATE: 71 BPM

## 2024-09-23 PROCEDURE — 2500000001 HC RX 250 WO HCPCS SELF ADMINISTERED DRUGS (ALT 637 FOR MEDICARE OP): Performed by: STUDENT IN AN ORGANIZED HEALTH CARE EDUCATION/TRAINING PROGRAM

## 2024-09-23 PROCEDURE — 2500000001 HC RX 250 WO HCPCS SELF ADMINISTERED DRUGS (ALT 637 FOR MEDICARE OP): Performed by: NURSE PRACTITIONER

## 2024-09-23 PROCEDURE — 99233 SBSQ HOSP IP/OBS HIGH 50: CPT | Performed by: STUDENT IN AN ORGANIZED HEALTH CARE EDUCATION/TRAINING PROGRAM

## 2024-09-23 PROCEDURE — 92523 SPEECH SOUND LANG COMPREHEN: CPT | Mod: GN

## 2024-09-23 PROCEDURE — 97162 PT EVAL MOD COMPLEX 30 MIN: CPT | Mod: GP

## 2024-09-23 PROCEDURE — 72141 MRI NECK SPINE W/O DYE: CPT | Performed by: RADIOLOGY

## 2024-09-23 PROCEDURE — 93306 TTE W/DOPPLER COMPLETE: CPT

## 2024-09-23 PROCEDURE — 92610 EVALUATE SWALLOWING FUNCTION: CPT | Mod: GN

## 2024-09-23 PROCEDURE — 93306 TTE W/DOPPLER COMPLETE: CPT | Performed by: INTERNAL MEDICINE

## 2024-09-23 PROCEDURE — 2060000001 HC INTERMEDIATE ICU ROOM DAILY

## 2024-09-23 PROCEDURE — 2500000004 HC RX 250 GENERAL PHARMACY W/ HCPCS (ALT 636 FOR OP/ED): Performed by: NURSE PRACTITIONER

## 2024-09-23 PROCEDURE — 97116 GAIT TRAINING THERAPY: CPT | Mod: GP

## 2024-09-23 PROCEDURE — 72141 MRI NECK SPINE W/O DYE: CPT

## 2024-09-23 PROCEDURE — 94760 N-INVAS EAR/PLS OXIMETRY 1: CPT

## 2024-09-23 PROCEDURE — 97165 OT EVAL LOW COMPLEX 30 MIN: CPT | Mod: GO

## 2024-09-23 RX ORDER — LORAZEPAM 1 MG/1
1 TABLET ORAL EVERY 10 MIN PRN
Status: DISCONTINUED | OUTPATIENT
Start: 2024-09-23 | End: 2024-09-25 | Stop reason: HOSPADM

## 2024-09-23 RX ADMIN — LORAZEPAM 1 MG: 1 TABLET ORAL at 21:22

## 2024-09-23 RX ADMIN — ASPIRIN 81 MG: 81 TABLET, COATED ORAL at 10:10

## 2024-09-23 RX ADMIN — ATORVASTATIN CALCIUM 40 MG: 40 TABLET, FILM COATED ORAL at 10:10

## 2024-09-23 RX ADMIN — SODIUM BICARBONATE 650 MG TABLET 650 MG: at 22:28

## 2024-09-23 RX ADMIN — CYANOCOBALAMIN TAB 1000 MCG 1000 MCG: 1000 TAB at 06:28

## 2024-09-23 RX ADMIN — SODIUM BICARBONATE 650 MG TABLET 650 MG: at 10:10

## 2024-09-23 RX ADMIN — ACYCLOVIR 200 MG: 200 CAPSULE ORAL at 22:27

## 2024-09-23 RX ADMIN — ACYCLOVIR 200 MG: 200 CAPSULE ORAL at 10:10

## 2024-09-23 RX ADMIN — Medication 2000 UNITS: at 06:28

## 2024-09-23 RX ADMIN — CEFTRIAXONE SODIUM 1 G: 1 INJECTION, SOLUTION INTRAVENOUS at 10:09

## 2024-09-23 RX ADMIN — TRAZODONE HYDROCHLORIDE 100 MG: 100 TABLET ORAL at 22:28

## 2024-09-23 ASSESSMENT — PAIN SCALES - GENERAL
PAINLEVEL_OUTOF10: 0 - NO PAIN

## 2024-09-23 ASSESSMENT — COGNITIVE AND FUNCTIONAL STATUS - GENERAL
MOBILITY SCORE: 19
WALKING IN HOSPITAL ROOM: A LITTLE
DRESSING REGULAR UPPER BODY CLOTHING: A LITTLE
HELP NEEDED FOR BATHING: A LITTLE
CLIMB 3 TO 5 STEPS WITH RAILING: A LITTLE
STANDING UP FROM CHAIR USING ARMS: A LITTLE
TURNING FROM BACK TO SIDE WHILE IN FLAT BAD: A LITTLE
EATING MEALS: A LITTLE
HELP NEEDED FOR BATHING: A LITTLE
MOVING TO AND FROM BED TO CHAIR: A LITTLE
WALKING IN HOSPITAL ROOM: A LITTLE
DRESSING REGULAR UPPER BODY CLOTHING: A LITTLE
STANDING UP FROM CHAIR USING ARMS: A LITTLE
PERSONAL GROOMING: A LITTLE
CLIMB 3 TO 5 STEPS WITH RAILING: TOTAL
TOILETING: A LOT
DRESSING REGULAR LOWER BODY CLOTHING: A LOT
TOILETING: A LITTLE
DAILY ACTIVITIY SCORE: 16
MOVING FROM LYING ON BACK TO SITTING ON SIDE OF FLAT BED WITH BEDRAILS: A LITTLE
DAILY ACTIVITIY SCORE: 19
MOVING TO AND FROM BED TO CHAIR: A LITTLE
TURNING FROM BACK TO SIDE WHILE IN FLAT BAD: A LITTLE
DRESSING REGULAR LOWER BODY CLOTHING: A LITTLE
PERSONAL GROOMING: A LITTLE
MOBILITY SCORE: 16

## 2024-09-23 ASSESSMENT — ACTIVITIES OF DAILY LIVING (ADL)
BATHING_ASSISTANCE: MINIMAL
ADL_ASSISTANCE: INDEPENDENT
ADL_ASSISTANCE: INDEPENDENT

## 2024-09-23 ASSESSMENT — PAIN - FUNCTIONAL ASSESSMENT
PAIN_FUNCTIONAL_ASSESSMENT: 0-10

## 2024-09-23 NOTE — ASSESSMENT & PLAN NOTE
Positive for CVA, lacunar infarct right frontal lobe  Possible CCR post discharge  Continue lipitor and aspirin  Echo pending

## 2024-09-23 NOTE — PROGRESS NOTES
Occupational Therapy    Evaluation    Patient Name: Lisbeth Cruz  MRN: [id number]  Department: [address]  Room: [address] [place] [address]  Today's Date: 9/23/2024  Time Calculation  Start Time: 1026  Stop Time: 1043  Time Calculation (min): 17 min    Assessment  IP OT Assessment  OT Assessment: Patient is a 64 year old female admitted with CVA. Patient is presenting below her baseline level with a decline in strength, balance, activity tolerance, and function, resulting in an increased need for assist with ADL/IADL tasks. Recommend skilled OT to increase safety and independence with daily tasks. Recommend 24/7 supervision for patient's safety.  Prognosis: Good  Barriers to Discharge: Other (Comment) (24/7 supervision for safety)  Evaluation/Treatment Tolerance: Patient tolerated treatment well  End of Session Communication: Bedside nurse  End of Session Patient Position: Up in chair, Alarm on  Plan:  Treatment Interventions: ADL retraining, Functional transfer training, UE strengthening/ROM, Endurance training, Cognitive reorientation, Patient/family training, Equipment evaluation/education, Compensatory technique education, Neuromuscular reeducation  OT Frequency: 4 times per week  OT Discharge Recommendations: Low intensity level of continued care, 24 hr supervision due to cognition  Equipment Recommended upon Discharge: Wheeled walker  OT Recommended Transfer Status: Assist of 1  OT - OK to Discharge: Yes    Subjective   Current Problem:  1. Cerebrovascular accident (CVA), unspecified mechanism (Multi)        2. Hypertension, unspecified type        3. Acute lower urinary tract infection        4. Other amyloidosis (Multi)  Transthoracic Echo (TTE) Complete    Transthoracic Echo (TTE) Complete        General:  General  Reason for Referral: decline in ADLs, CVA  Referred By: LINDA Kulkarni  Past Medical History Relevant to Rehab: anxiety, cardiomyopathy, HTN, dyspnea, chronic diastolic heart failure  Family/Caregiver  "Present: No  Prior to Session Communication: Bedside nurse  Patient Position Received: Bed, 3 rail up, Alarm on  Preferred Learning Style: verbal, visual  General Comment: Patient is a 64 year old female who presented to the ED with c/o R sided weakness and stroke like symptoms. MRI of the brain completed indicating: Acute/subacute lacunar infarction in the right frontal lobe white  matter measuring a proximally 3 mm in size. No hemorrhage or  measurable mass effect *No evidence of intracranial mass, extra-axial  collection or cerebral hemorrhage. Patient is cleared by nursing for therapy. Patient in bed upon arrival and agreeable to participate  Precautions:  Medical Precautions: Fall precautions    Pain:  Pain Assessment  Pain Assessment: 0-10  0-10 (Numeric) Pain Score: 0 - No pain    Objective   Cognition:  Overall Cognitive Status: Impaired  Orientation Level: Disoriented to time  Cognition Comments: patient able to follow commands throughout.     Home Living:  Type of Home: [facility]  Lives With: Adult children, Grandchildren (7 year old twin grandchildren)  Home Adaptive Equipment:  (rollator)  Home Layout: Two level, Able to live on main level with bedroom/bathroom  Home Access: Stairs to enter without rails  Entrance Stairs-Rails: None  Entrance Stairs-Number of Steps: 1 small step  Bathroom Shower/Tub: Walk-in shower  Bathroom Toilet: Handicapped height  Bathroom Equipment: Grab bars in shower, Shower chair with back  Home Living Comments: patient reports independent with ADLs at baseline   Prior Function:  Level of [place]: Independent with ADLs and functional transfers, Independent with homemaking with ambulation  Receives Help From: Family  ADL Assistance: Independent  Homemaking Assistance: Independent (shares IADLs with daughter. daughter does primarily manage)  Ambulatory Assistance: Independent (uses rollator when \"cooking\")  Leisure: enjoys spending time with her grandchildren  Hand Dominance: " Right  Prior Function Comments: patient appears to be a questionable historian  IADL History:  Homemaking Responsibilities: Yes  IADL Comments: patient does assist with some iADLs, reports that her daughter primarily manages. also reports her daughter is home with her all the time  ADL:  Eating Assistance: Independent  Eating Deficit: Setup (all items within reach)  Grooming Assistance: Stand by  Grooming Deficit: Steadying, Standing with assistive device, Increased time to complete, Supervision/safety (per clinical judgement)  Bathing Assistance: Minimal  Bathing Deficit: Setup, Steadying, Supervision/safety, Increased time to complete , Buttocks, Use of adaptive equipment (per clinical judgement)  UE Dressing Assistance: Stand by  UE Dressing Deficit: Setup (per clinical judgement)  LE Dressing Assistance: Minimal  LE Dressing Deficit: Use of adaptive equipment, Supervision/safety, Requires assistive device for steadying, Steadying, Increased time to complete (per clinical judgement)  Toileting Assistance with Device: Maximal  Toileting Deficit: Clothing management up, Clothing management down, Perineal hygiene (purewick)  Activity Tolerance:  Endurance: Decreased tolerance for upright activites  Activity Tolerance Comments: Fair+ tolerance  Bed Mobility/Transfers: Bed Mobility  Bed Mobility: Yes  Bed Mobility 1  Bed Mobility 1: Supine to sitting  Level of Assistance 1: Close supervision  Bed Mobility Comments 1: head of bed elevated    Transfers  Transfer: Yes  Transfer 1  Transfer From 1: Bed to  Transfer to 1: Stand  Technique 1: Sit to stand  Transfer Device 1: Walker  Transfer Level of Assistance 1: Contact guard    Functional Mobility:  Functional Mobility  Functional Mobility Performed: Yes  Functional Mobility 1  Surface 1: Level tile  Device 1: Rolling walker  Assistance 1: Contact guard  Comments 1: decreased accuracy of R LE  Sitting Balance:  Static Sitting Balance  Static Sitting-Balance Support: Feet  supported, Bilateral upper extremity supported  Static Sitting-Level of Assistance: Close supervision    IADL's:   Homemaking Responsibilities: Yes  IADL Comments: patient does assist with some iADLs, reports that her daughter primarily manages. also reports her daughter is home with her all the time  Vision: Vision - Basic Assessment  Current Vision: Wears glasses all the time   and Vision - Complex Assessment  Vision Comments: patient denies any changes in vision  Sensation:  Sensation Comment: patient denies any numbness/tingling  Strength:  Strength Comments: B UE 4-/5 grossly    Coordination:  Movements are Fluid and Coordinated: Yes  Finger to Nose: Intact  Finger to Target: Intact   Hand Function:  Hand Function  Gross Grasp: Functional  Coordination: Functional  Extremities: RUE   RUE : Within Functional Limits and LUE   LUE: Within Functional Limits    Outcome Measures: [facility] Daily Activity  Putting on and taking off regular lower body clothing: A lot  Bathing (including washing, rinsing, drying): A little  Putting on and taking off regular upper body clothing: A little  Toileting, which includes using toilet, bedpan or urinal: A lot  Taking care of personal grooming such as brushing teeth: A little  Eating Meals: A little  Daily Activity - Total Score: 16    Education Documentation  Body Mechanics, taught by Kristal Rossi OT at 9/23/2024 11:29 AM.  Learner: Patient  Readiness: Acceptance  Method: Explanation, Demonstration  Response: Verbalizes Understanding, Needs Reinforcement    Precautions, taught by Kristal Rossi OT at 9/23/2024 11:29 AM.  Learner: Patient  Readiness: Acceptance  Method: Explanation, Demonstration  Response: Verbalizes Understanding, Needs Reinforcement    ADL Training, taught by Kristal Rossi OT at 9/23/2024 11:29 AM.  Learner: Patient  Readiness: Acceptance  Method: Explanation, Demonstration  Response: Verbalizes Understanding, Needs Reinforcement    Education Comments  No  comments found.      Goals:   Encounter Problems       Encounter Problems (Active)       OT Goals       ADLs (Progressing)       Start:  09/23/24    Expected End:  10/14/24       Patient will complete ADL tasks, with modified independence, using AE need in order to increase patient's safety and independence with self-care tasks.          Functional Transfers (Progressing)       Start:  09/23/24    Expected End:  10/14/24       Patient will complete functional transfers with modified independence, using least restrictive device, in order to increase patient's safety and independence with daily tasks.          B UE Strengthening (Progressing)       Start:  09/23/24    Expected End:  10/14/24       Patient will increase B UE strength to 4/5 for functional transfers.          Functional Mobility (Progressing)       Start:  09/23/24    Expected End:  10/14/24       Patient will demonstrate the ability to complete item retrieval and functional mobility with modified independence, in order to increase safety and independence with daily tasks.          Activity Tolerance (Progressing)       Start:  09/23/24    Expected End:  10/14/24       Patient will demonstrate the ability to participate in functional activity at least >/= 20 minutes in order to increase patient's safety and independence with daily tasks.

## 2024-09-23 NOTE — ASSESSMENT & PLAN NOTE
Will continue with trazodone    Plan of Care  Possible CCR placement    Plan of care discussed with patient and collaborating physician.

## 2024-09-23 NOTE — PROGRESS NOTES
Speech-Language Pathology    Inpatient Speech-Language Pathology Clinical Swallow Evaluation       Patient Name: Lisbeth Cruz  MRN: [id number]  : 1960  Today's Date: 24  Time Calculation  Start Time: 1345  Stop Time: 1405  Time Calculation (min): 20 min       Current Problem:  1. Cerebrovascular accident (CVA), unspecified mechanism (Multi)        2. Hypertension, unspecified type        3. Acute lower urinary tract infection        4. Other amyloidosis (Multi)  Transthoracic Echo (TTE) Complete    Transthoracic Echo (TTE) Complete          Past Medical History:  Past Medical History:   Diagnosis Date    Anxiety 2024    History of gastric bypass 2022    Remote history () but has had chronic nausea since      Insomnia 2024    Major depressive disorder 2024    Multiple myeloma not having achieved remission (Multi) 2024    Other amyloidosis (Multi) 2024    Primary hypertension 2024    Renal mass     Stage 4 chronic kidney disease (Multi) 2024     Reason for Referral:  Pt presented to ED on 24 with symptoms of possible stroke, including slurred speech and right-sided weakness/facial droop.  Pt admitted for further testing to r/o stroke.  Pt referred to Speech-Language Pathology services for Clinical Swallow Evaluation in order to assess current swallow skills and determine plan of treatment.      Relevant Imaging Results:   CXR 24 --   IMPRESSION:  1. Mild pulmonary vascular congestion without acute abnormality      CT head 24 --  IMPRESSION:  1. Interval evidence of hyperdensity in the falx cerebri and in the  intracranial vasculature as described above is most likely favored to  be related to recent intravenous contrast administered during the CT  angiogram head performed on the same day. Within this limitation,  there is no evidence of intracranial hemorrhage or mass effect or  midline shift.  2. No definite CT evidence of territorial  loss of gray-white  differentiation to suggest acute infarction. However an MRI can be  considered for definitive evaluation.    MRI brain 9/22/24--  IMPRESSION:  *Acute/subacute lacunar infarction in the right frontal lobe white  matter measuring a proximally 3 mm in size. No hemorrhage or  measurable mass effect *No evidence of intracranial mass, extra-axial  collection or cerebral hemorrhage.       Recommendations:  Risk for Aspiration: No   Solid Diet Recommendations : Regular (IDDSI Level 7)   Liquid Diet Recommendations: Thin (IDDSI Level 0)         MBSS Recommended: No, as no pharyngeal dysphagia is suspected per Clinical Swallow Evaluation.  Medication Intake Method: Whole with liquid  Compensatory Swallow Strategies:   - Upright positioning as close to 90º as possible  - Small bites/sips  - Add moisture to dry solids      Skilled dysphagia treatment is not warranted at next level of care as pt has achieved functional baseline and further intervention is not indicated.    Assessment    Impression: Pt demonstrates adequate oral and pharyngeal phases of swallow despite lack of dentition.    Recommend continue currently ordered diet, as noted above.  Further intervention for swallowing not indicated at this time.    Consistencies Trialed: Consistencies Trialed: Thin (IDDSI Level 0) - Straw, Thin (IDDSI Level 0) - Cup, Regular (IDDSI Level 7)   Oral Motor: Within Functional Limits  Dentition: Many missing teeth (pt reports more missing than present)  Medical Staff Made Aware: Yes     Plan:  Discussed POC: Patient   Discussed Risks/Benefits: Yes   Patient/Caregiver Agreeable: Yes     Skilled dysphagia treatment is not warranted due to patient is at functional baseline    Subjective   Pt pleasant and cooperative, upright in bed for assessment.    Objective  Clinical Swallow Evaluation completed consisting of patient/caregiver interview, oral motor assessment, and analysis of swallow abilities with PO trials  (4 oz  water via cup & straw, regular solids.)    ORAL PHASE:   Dentition in poor condition with many missing and only a few remaining.   Oral mucosa were pink, moist, and free of obvious lesions.    Mastication of regular solids was prolonged but efficient.  Bolus formation,  A-P transport, and oral clearance were adequate.    PHARYNGEAL PHASE:   Laryngeal movement was visualized with all trials, however adequacy of hyolaryngeal elevation/excursion cannot be determined at bedside.   No overt (immediate or delayed) s/s aspiration were demonstrated with any consistencies.  Vocal quality clear post all swallows.     Pain:  Pain Assessment  Pain Assessment: 0-10  0-10 (Numeric) Pain Score: 0 - No pain     Oxygen Status:  Room air    Oral Motor Assessment:  Oral/Motor Assessment  Oral Hygiene: WFL  Dentition: Other (Comment) (Many missing teeth (pt reports more missing than present))  Oral Motor: Within Functional Limits  Labial Deviation Left: Reduced  Labial ROM: Within Functional Limits  Lingual Deviation Left: Within Functional Limits  Lingual Deviation Right: Within Functional Limits  Lingual ROM: Within Functional Limits  Palatal Elevation: Within Functional Limits  Intelligibility: Intelligible    Inpatient Education:   Individual(s) Educated: Patient  Verbal Education : Results and recommendations  Risk and Benefits Discussed with Patient/Caregiver/Other: yes  Patient/Caregiver Demonstrated Understanding: yes  Plan of Care Discussed and Agreed Upon: yes  Patient Response to Education: Patient/Caregiver Verbalized Understanding of Information    Communication with Medical Team:  Results of the clinical swallow evaluation were discussed verbally with nurse upon completion with verbal understanding noted.

## 2024-09-23 NOTE — CARE PLAN
Problem: Skin  Goal: Participates in plan/prevention/treatment measures  9/23/2024 0352 by Nely Martins RN  Outcome: Progressing  9/23/2024 0352 by Nely Martins RN  Outcome: Progressing  Goal: Prevent/manage excess moisture  9/23/2024 0352 by Nely Martins RN  Outcome: Progressing  9/23/2024 0352 by Nely Martins RN  Outcome: Progressing  Goal: Prevent/minimize sheer/friction injuries  9/23/2024 0352 by Nely Martins RN  Outcome: Progressing  Flowsheets (Taken 9/23/2024 0352)  Prevent/minimize sheer/friction injuries: HOB 30 degrees or less  9/23/2024 0352 by Nely Martins RN  Outcome: Progressing  Flowsheets (Taken 9/23/2024 0352)  Prevent/minimize sheer/friction injuries: HOB 30 degrees or less  Goal: Promote/optimize nutrition  9/23/2024 0352 by Nely Martins RN  Outcome: Progressing  9/23/2024 0352 by Nely Martins RN  Outcome: Progressing  Goal: Promote skin healing  9/23/2024 0352 by Nely Martins RN  Outcome: Progressing  9/23/2024 0352 by Nely Martins RN  Outcome: Progressing     Problem: Fall/Injury  Goal: Not fall by end of shift  9/23/2024 0352 by Nely Martins RN  Outcome: Progressing  9/23/2024 0352 by Nely Martins RN  Outcome: Progressing  Goal: Be free from injury by end of the shift  9/23/2024 0352 by Nely Martins RN  Outcome: Progressing  9/23/2024 0352 by Nely Matrins RN  Outcome: Progressing  Goal: Verbalize understanding of personal risk factors for fall in the hospital  9/23/2024 0352 by Nely Martins RN  Outcome: Progressing  9/23/2024 0352 by Nely Martins RN  Outcome: Progressing  Goal: Verbalize understanding of risk factor reduction measures to prevent injury from fall in the home  9/23/2024 0352 by Nely Martins RN  Outcome: Progressing  9/23/2024 0352 by Nely Martins RN  Outcome: Progressing  Goal: Use assistive devices by end of the shift  9/23/2024 0352 by Nely HEIN  JOSESITO Martins  Outcome: Progressing  9/23/2024 0352 by Nely Martins RN  Outcome: Progressing  Goal: Pace activities to prevent fatigue by end of the shift  9/23/2024 0352 by Nely Martins RN  Outcome: Progressing  9/23/2024 0352 by Nely Martins RN  Outcome: Progressing   The patient's goals for the shift include      The clinical goals for the shift include monitor safety and neuros    Over the shift, the patient did not make progress toward the following goals. Barriers to progression include   Problem: Skin  Goal: Participates in plan/prevention/treatment measures  9/23/2024 0352 by Nely Martins RN  Outcome: Progressing  9/23/2024 0352 by Nely Martins RN  Outcome: Progressing  Goal: Prevent/manage excess moisture  9/23/2024 0352 by Nely Martins RN  Outcome: Progressing  9/23/2024 0352 by Nely Martins RN  Outcome: Progressing  Goal: Prevent/minimize sheer/friction injuries  9/23/2024 0352 by Nely Martins RN  Outcome: Progressing  Flowsheets (Taken 9/23/2024 0352)  Prevent/minimize sheer/friction injuries: HOB 30 degrees or less  9/23/2024 0352 by Nely Martins RN  Outcome: Progressing  Flowsheets (Taken 9/23/2024 0352)  Prevent/minimize sheer/friction injuries: HOB 30 degrees or less  Goal: Promote/optimize nutrition  9/23/2024 0352 by Nely Martins RN  Outcome: Progressing  9/23/2024 0352 by Nely Martins RN  Outcome: Progressing  Goal: Promote skin healing  9/23/2024 0352 by Nely Martins RN  Outcome: Progressing  9/23/2024 0352 by Nely Martins RN  Outcome: Progressing     Problem: Fall/Injury  Goal: Not fall by end of shift  9/23/2024 0352 by Nely Martins RN  Outcome: Progressing  9/23/2024 0352 by Nely Martins RN  Outcome: Progressing  Goal: Be free from injury by end of the shift  9/23/2024 0352 by Nely Martins RN  Outcome: Progressing  9/23/2024 0352 by Nely Martins RN  Outcome: Progressing  Goal:  Verbalize understanding of personal risk factors for fall in the hospital  9/23/2024 0352 by Nely Martins RN  Outcome: Progressing  9/23/2024 0352 by Nely Martins RN  Outcome: Progressing  Goal: Verbalize understanding of risk factor reduction measures to prevent injury from fall in the home  9/23/2024 0352 by Nely Martins RN  Outcome: Progressing  9/23/2024 0352 by Nely Martins RN  Outcome: Progressing  Goal: Use assistive devices by end of the shift  9/23/2024 0352 by Nely Martins RN  Outcome: Progressing  9/23/2024 0352 by Nely Martins RN  Outcome: Progressing  Goal: Pace activities to prevent fatigue by end of the shift  9/23/2024 0352 by Nely Martins RN  Outcome: Progressing  9/23/2024 0352 by Nely Martins RN  Outcome: Progressing   . Recommendations to address these barriers include

## 2024-09-23 NOTE — CARE PLAN
The patient's goals for the shift include      The clinical goals for the shift include monitor safety and neuros    Over the shift, the patient did not make progress toward the following goals. Barriers to progression include . Recommendations to address these barriers include   Problem: Skin  Goal: Participates in plan/prevention/treatment measures  Outcome: Progressing  Goal: Prevent/manage excess moisture  Outcome: Progressing  Goal: Prevent/minimize sheer/friction injuries  Outcome: Progressing  Goal: Promote/optimize nutrition  Outcome: Progressing  Goal: Promote skin healing  Outcome: Progressing     Problem: Fall/Injury  Goal: Not fall by end of shift  Outcome: Progressing  Goal: Be free from injury by end of the shift  Outcome: Progressing  Goal: Verbalize understanding of personal risk factors for fall in the hospital  Outcome: Progressing  Goal: Verbalize understanding of risk factor reduction measures to prevent injury from fall in the home  Outcome: Progressing  Goal: Use assistive devices by end of the shift  Outcome: Progressing  Goal: Pace activities to prevent fatigue by end of the shift  Outcome: Progressing

## 2024-09-23 NOTE — PROGRESS NOTES
09/23/24 1605   Discharge Planning   Living Arrangements Children  (Lives w/daughter and grandkids)   Support Systems Children;Family members   Assistance Needed None   Type of Residence Private residence   Number of Stairs to Enter Residence 1   Number of Stairs Within Residence   (Patient resides on main level of multi-level home.)   Home or Post Acute Services Post acute facilities (Rehab/SNF/etc)   Type of Post Acute Facility Services Rehab  ([facility] reviewing.  Awaiting updates/acceptance.  No precert needed.)   Expected Discharge Disposition Rehab   Does the patient need discharge transport arranged? Yes   RoundTrip coordination needed? Yes   Has discharge transport been arranged? No     1615  No beds at [facility].  Referrals sent to  [facility] & [facility].  Awaiting updates.

## 2024-09-23 NOTE — PROGRESS NOTES
Subjective:  Patient seen virtually today.    Pt continues to have R sided weakness. She had difficulty ambulating with PT and needed walker.      MRI was done and showed R frontal punctate infarct-- would NOT explain her R sided weakness. Discussed this with daughter by bedside.     Objective:  Exam:  Vitals:    09/23/24 1133   BP: 157/79   Pulse: 74   Resp: 17   Temp: 36.4 °C (97.5 °F)   SpO2: 100%     Exam performed virtually via video.  Laying in bed, in NAD  EOMI, face with mild NL fold in R   Drift in RUE and RLE   Sensation intact to light touch x4     NIHSS 3      ASSESSMENT:  Lisbeth Cruz is a 64 y.o. female with reported hx of afib not on AC and multiple myeloma, now with R sided weakness and trouble getting out of bed, stuttering sx throughout the day of presentation, now improved somewhat. Given hx of afib pt had stroke eval and was found with punctate R frontal infarct, which would not explain her sx.         Diagnosis:   R sided weakness not explained by infarct   Incidental R frontal punctate infarct      RECOMMENDATIONS:   - obtain cervical spine to rule out cervical spinal stenosis causing RUE / RLE weakness   - continue aspirin for now while undergoing stroke eval  - A1c, echo normal EF, mildly dilated LA size, bubble study no mention of results, LDL 98   - continue high intensity statin   - given punctate R frontal infarct, pt may need anticoagulation if it is true pt has hx of afib -- I was unable to confirm this in chart, it is not listed in her PCP note, would benefit from ziopatch on discharge   - neurochecks q4h and telemonitoring   - PT/OT/SLP as appropriate             Consult completed by: TELEPHONE and VIDEO interactive communication was used to provide this telehealth service. Time includes consultation with ED provider and extensive review of data- history, medical records, lab/radiology/medical test results, neuroimaging studies:  35 minutes was spent in FOLLOW-UP telehealth  consultation

## 2024-09-23 NOTE — PROGRESS NOTES
Speech-Language Pathology    SLP Adult Inpatient Speech-Language Cognition    Patient Name: Lisbeth Cruz  MRN: [id number]  Today's Date: 9/23/2024   Time Calculation  Start Time: 1405  Stop Time: 1445  Time Calculation (min): 40 min         Current Problem:   1. Cerebrovascular accident (CVA), unspecified mechanism (Multi)        2. Hypertension, unspecified type        3. Acute lower urinary tract infection        4. Other amyloidosis (Multi)  Transthoracic Echo (TTE) Complete    Transthoracic Echo (TTE) Complete      Past Medical History:  Medical History        Past Medical History:   Diagnosis Date    Anxiety 09/21/2024    History of gastric bypass 02/24/2022     Remote history (1988) but has had chronic nausea since      Insomnia 09/21/2024    Major depressive disorder 09/21/2024    Multiple myeloma not having achieved remission (Multi) 09/21/2024    Other amyloidosis (Multi) 09/21/2024    Primary hypertension 09/21/2024    Renal mass      Stage 4 chronic kidney disease (Multi) 09/21/2024         Reason for Referral:  Pt presented to ED on 9/21/24 with symptoms of possible stroke, including slurred speech and right-sided weakness/facial droop.  Pt admitted for further testing to r/o stroke.  Pt referred to Speech-Language Pathology services for Clinical Swallow Evaluation in order to assess current swallow skills and determine plan of treatment.       Relevant Imaging Results:              CXR 9/21/24 --              IMPRESSION:  1. Mild pulmonary vascular congestion without acute abnormality                            CT head 9/21/24 --  IMPRESSION:  1. Interval evidence of hyperdensity in the falx cerebri and in the  intracranial vasculature as described above is most likely favored to  be related to recent intravenous contrast administered during the CT  angiogram head performed on the same day. Within this limitation,  there is no evidence of intracranial hemorrhage or mass effect or  midline shift.  2. No  definite CT evidence of territorial loss of gray-white  differentiation to suggest acute infarction. However an MRI can be  considered for definitive evaluation.     MRI brain 9/22/24--  IMPRESSION:  *Acute/subacute lacunar infarction in the right frontal lobe white  matter measuring a proximally 3 mm in size. No hemorrhage or  measurable mass effect *No evidence of intracranial mass, extra-axial  collection or cerebral hemorrhage.                  SLP Assessment:  SLP Assessment  SLP Assessment Results: Cognitive Deficits  Medical Staff Made Aware: Yes    Pt presents with very minimal L labial asymmetry that does not interfere with intelligibility in conversation or swallowing skills.  Intelligibility may be slightly impacted at baseline due to missing dentition.    Speech and expressive language skills WFL per standardized measurements conducted; however, pt had difficulty reading at word, phrase, and sentence level.  Pt reports no difficulty with vision since onset of symptoms, and no previous difficulty reading.  Pt presents with mild cognitive impairment characterized by disorientation to time and difficulty with complex problem solving tasks.    SLP Plan:  Plan  Inpatient/Swing Bed or Outpatient: Inpatient  Treatment/Interventions: Cognitive communication functioning  SLP Plan: Skilled SLP  SLP Frequency: 3x per week  Duration: 2 weeks  Next Treatment Priority: orientation, reading word and phrase level  Discussed POC: Patient  Discussed Risks/Benefits: Yes  Patient/Caregiver Agreeable: Yes    Goals:  (Established 9/23/24, projected discontinuation 2 weeks)    - Pt will complete functional reading tasks at phrase level with 90% acc to improve ability to use external visual supports.  - Pt will express temporal orientation with use of visual supports with 100% acc in order to improve ability to track time and plan for future events.   - Pt will complete functional problem solving tasks with 80% acc in order to  make decisions about care needs and participate safely in ADLs.  - Ongoing assessment as indicated for further goal development.       Subjective   Pt pleasant and cooperative, upright in bed for assessment.    General Visit Information:  General Information  Referred By: LINDA Kulkarni  Past Medical History Relevant to Rehab: anxiety, cardiomyopathy, HTN, dyspnea, chronic diastolic heart failure  Prior to Session Communication: Bedside nurse      Objective   Speech-Language/cognitive assessment completed consisting of patient/caregiver interview, oral motor assessment, and standardized assessment.     Pain:  Pain Assessment  Pain Assessment: 0-10  0-10 (Numeric) Pain Score: 0 - No pain      Cognition:  Cognition  Overall Cognitive Status: Impaired  Orientation Level: Disoriented to time  Problem Solving: Exceptions to WFL  Verbal Reasoning Skills: Impaired  Numeric Reasoning: Exceptions to WFL   Complex Calculations: Impaired  Processing Speed: Delayed    Motor Speech Production:  Motor Speech Production  Assessments Used: QAB  Oral Motor : WFL  Automatic Speech: WFL  Repetition: WFL  Intelligibility: WFL    Auditory Comprehension:   Auditory Comprehension  Yes/No Questions: Within Functional Limits  Commands: Within Functional Limits  Conversation: Within Functional Limits    Visual recognition:   WFL    Reading Comprehension:  Reading Comprehension  Reading Status: Exceptions to WFL  Words : Impairment  Sentence : Impaired  Interfering Components: Processing time    Verbal:  Verbal Expression  Primary Mode of Expression: Verbal  Primary Language: English  Repetition: Within Functional Limits      SLP Outcome Measures:    Quick Aphasia Battery    Pt participated in assessment via the Quick Aphasia Battery (QAB) with the following results:     QAB overall score of 8.96 indicates speech-language skills to be WFL.      Summary        Word comprehension 10.00   Sentence comprehension 7.50   Word finding 9.25    Grammatical construction 9.38   Speech motor programming 10.00   Repetition 9.17   Reading 5.83   QAB overall 8.96     Cognitive Impairment Testing Tool     Pt participated in assessment with the following results:     Naming score: 3/3  Visual score: 3/3  Counting score: 1/1  Substraction score: 3/6  Memory score: 3/3  Attention score: 7/7  Orientation score: 4/7    TOTAL SCORE: 24/30    Score result indicates mild cognitive impairment.    Inpatient Education:  Individual(s) Educated: Patient  Verbal Education : Results and recommendations  Risk and Benefits Discussed with Patient/Caregiver/Other: yes  Patient/Caregiver Demonstrated Understanding: yes  Plan of Care Discussed and Agreed Upon: yes  Patient Response to Education: Patient/Caregiver Verbalized Understanding of Information

## 2024-09-23 NOTE — PROGRESS NOTES
Bishnu Santos is a 64 y.o. female on day 2 of admission presenting with No Principal Problem: There is no principal problem currently on the Problem List. Please update the Problem List and refresh..      Subjective   Patient denies pain or sob. Patient assisted to bed from chair with minimal one person assist. She denies any pain or sob.        Objective     Last Recorded Vitals  /79 (BP Location: Right arm, Patient Position: Lying)   Pulse 74   Temp 36.4 °C (97.5 °F) (Temporal)   Resp 17   Wt 69.3 kg (152 lb 12.8 oz)   SpO2 100%   Intake/Output last 3 Shifts:  No intake or output data in the 24 hours ending 09/23/24 1344    Admission Weight  Weight: 58.3 kg (128 lb 8.5 oz) (09/22/24 0339)    Daily Weight  09/23/24 : 69.3 kg (152 lb 12.8 oz)    Image Results  ECG 12 lead  Normal sinus rhythm  Normal ECG  No previous ECGs available  Confirmed by Mario Alberto Hammond ([id number]) on 9/23/2024 12:11:39 PM  Transthoracic Echo (TTE) [place] [address]              Phone [phone]    TRANSTHORACIC ECHOCARDIOGRAM REPORT    Patient Name:     BISHNU SANTOS      Reading Physician:   [id number] Bradley DO  Study Date:       9/23/2024            Ordering Provider:   [id number] SANDRA VERNON  MRN/PID:          [id number]             Fellow:  Accession#:       [id number]         Nurse:  Date of           1960 / 64 years Sonographer:         Monika Ramos RDCS  Birth/Age:  Gender:           F                    Additional Staff:  Height:           154.94 cm            Admit Date:  Weight:           68.95 kg             Admission Status:    Inpatient - Routine  BSA / BMI:        1.68 m2 / 28.72      Department Location: [facility]                    kg/m2  Blood Pressure: 124 /50 mmHg    Study Type:    TRANSTHORACIC ECHO (TTE) COMPLETE  Diagnosis/ICD: Other amyloidosis-E85.8  CPT Codes:      Echo Complete w Full Doppler-10078    Patient History:  Pertinent History: HTN, cardiomyopathy, CKD, CHF.    Study Detail: The following Echo studies were performed: 2D, M-Mode, Doppler and                color flow. Technically challenging study due to body habitus.                Agitated saline used as a contrast agent for intraseptal flow                evaluation.       PHYSICIAN INTERPRETATION:  Left Ventricle: Left ventricular ejection fraction is normal, by visual estimate at 60-65%. There are no regional wall motion abnormalities. The left ventricular cavity size is normal. Spectral Doppler shows an impaired relaxation pattern of left ventricular diastolic filling.  Left Atrium: The left atrium is mildly dilated. A bubble study using agitated saline was performed.  Right Ventricle: The right ventricle is normal in size. There is normal right ventricular global systolic function.  Right Atrium: The right atrium is normal in size.  Aortic Valve: The aortic valve appears structurally normal. The aortic valve dimensionless index is 0.80. There is no evidence of aortic valve regurgitation. The peak instantaneous gradient of the aortic valve is 11.2 mmHg. The mean gradient of the aortic valve is 5.0 mmHg.  Mitral Valve: The mitral valve is normal in structure. There is no evidence of mitral valve regurgitation.  Tricuspid Valve: The tricuspid valve is structurally normal. There is trace to mild tricuspid regurgitation. The Doppler estimated RVSP is within normal limits at 28.4 mmHg.  Pulmonic Valve: The pulmonic valve is structurally normal. There is no indication of pulmonic valve regurgitation.  Pericardium: No pericardial effusion noted.  Aorta: The aortic root is normal.       CONCLUSIONS:   1. Left ventricular ejection fraction is normal, by visual estimate at 60-65%.   2. Spectral Doppler shows an impaired relaxation pattern of left ventricular diastolic filling.   3. There is normal right ventricular  global systolic function.   4. Right ventricular within normal limits.    QUANTITATIVE DATA SUMMARY:     2D MEASUREMENTS:           Normal Ranges:  LAs:             4.20 cm   (2.7-4.0cm)  IVSd:            0.65 cm   (0.6-1.1cm)  LVPWd:           1.15 cm   (0.6-1.1cm)  LVIDd:           4.24 cm   (3.9-5.9cm)  LVIDs:           2.25 cm  LV Mass Index:   71.8 g/m2  LV % FS          46.9 %       LA VOLUME:                    Normal Ranges:  LA Vol A4C:        64.9 ml    (22+/-6mL/m2)  LA Vol A2C:        66.9 ml  LA Vol BP:         68.9 ml  LA Vol Index A4C:  38.6ml/m2  LA Vol Index A2C:  39.8 ml/m2  LA Vol Index BP:   41.0 ml/m2  LA Area A4C:       21.0 cm2  LA Area A2C:       22.3 cm2  LA Major Axis A4C: 5.8 cm  LA Major Axis A2C: 6.3 cm  LA Volume Index:   38.8 ml/m2  LA Vol A4C:        61.4 ml  LA Vol A2C:        63.2 ml  LA Vol Index BSA:  37.1 ml/m2       RA VOLUME BY A/L METHOD:            Normal Ranges:  RA Vol A4C:              18.8 ml    (8.3-19.5ml)  RA Vol Index A4C:        11.2 ml/m2  RA Area A4C:             10.2 cm2  RA Major Axis A4C:       4.7 cm       M-MODE MEASUREMENTS:         Normal Ranges:  Ao Root:             2.80 cm (2.0-3.7cm)  LAs:                 4.60 cm (2.7-4.0cm)       AORTA MEASUREMENTS:         Normal Ranges:  Ao Sinus, d:        2.88 cm (2.1-3.5cm)  Ao STJ, d:          2.76 cm (1.7-3.4cm)  Asc Ao, d:          2.80 cm (2.1-3.4cm)       LV SYSTOLIC FUNCTION BY 2D PLANIMETRY (MOD):                       Normal Ranges:  EF-A4C View:    61 % (>=55%)  EF-A2C View:    49 %  EF-Biplane:     55 %  EF-Visual:      63 %  LV EF Reported: 63 %       LV DIASTOLIC FUNCTION:           Normal Ranges:  MV Peak E:             0.53 m/s  (0.7-1.2 m/s)  MV Peak A:             0.87 m/s  (0.42-0.7 m/s)  E/A Ratio:             0.61      (1.0-2.2)  MV e'                  0.055 m/s (>8.0)  MV lateral e'          0.06 m/s  MV medial e'           0.05 m/s  E/e' Ratio:            9.58      (<8.0)       MITRAL VALVE:           Normal Ranges:  MV DT:        225 msec (150-240msec)       AORTIC VALVE:                      Normal Ranges:  AoV Vmax:                1.67 m/s  (<=1.7m/s)  AoV Peak P.2 mmHg (<20mmHg)  AoV Mean P.0 mmHg  (1.7-11.5mmHg)  LVOT Max Kevin:            1.31 m/s  (<=1.1m/s)  AoV VTI:                 31.10 cm  (18-25cm)  LVOT VTI:                24.80 cm  LVOT Diameter:           2.00 cm   (1.8-2.4cm)  AoV Area, VTI:           2.51 cm2  (2.5-5.5cm2)  AoV Area,Vmax:           2.46 cm2  (2.5-4.5cm2)  AoV Dimensionless Index: 0.80       RIGHT VENTRICLE:  RV Basal 2.73 cm  RV Mid   2.43 cm  RV Major 6.2 cm  TAPSE:   23.6 mm  RV s'    0.07 m/s       TRICUSPID VALVE/RVSP:          Normal Ranges:  Peak TR Velocity:     2.52 m/s  RV Syst Pressure:     28 mmHg  (< 30mmHg)  IVC Diam:             1.85 cm       [id number] Enzo Bradley DO  Electronically signed on 2024 at 11:52:08 AM       ** Final **      Physical Exam  Constitutional:       Appearance: Normal appearance.   Cardiovascular:      Rate and Rhythm: Normal rate and regular rhythm.      Pulses: Normal pulses.      Heart sounds: Normal heart sounds.   Pulmonary:      Effort: Pulmonary effort is normal.      Breath sounds: Normal breath sounds.   Abdominal:      General: Bowel sounds are normal.      Palpations: Abdomen is soft.   Musculoskeletal:         General: Normal range of motion.   Skin:     General: Skin is warm and dry.   Neurological:      Mental Status: She is alert and oriented to person, place, and time.      Motor: Weakness present.      Gait: Gait abnormal.         Relevant Results             Results for orders placed or performed during the hospital encounter of 24 (from the past 24 hour(s))   Transthoracic Echo (TTE) Complete   Result Value Ref Range    AV pk kevin 1.67 m/s    LVOT diam 2.00 cm    AV mn grad 5.0 mmHg    MV E/A ratio 0.61     Tricuspid annular plane systolic excursion 2.4 cm    LV Biplane EF 55 %     LA vol index A/L 41.0 ml/m2    LV EF 63 %    RV free wall pk S' 7.07 cm/s    RVSP 28.4 mmHg    LVIDd 4.24 cm    AV pk grad 11.2 mmHg    Aortic Valve Area by Continuity of VTI 2.51 cm2    Aortic Valve Area by Continuity of Peak Velocity 2.46 cm2    LV A4C EF 61.3        Assessment/Plan                  Assessment & Plan  CVA (cerebral vascular accident) (Multi)  Positive for CVA, lacunar infarct right frontal lobe  Possible CCR post discharge  Continue lipitor and aspirin  Echo pending  Primary hypertension  Not on any medication well-controlled with diet  Major depressive disorder  Resume Zoloft when patient passed her swallowing eval  Anxiety  Continue with the same regimen  Other amyloidosis (Multi)  Patient was having diarrhea and taking Imodium daily to control it  Stage 4 chronic kidney disease (Multi)  Monitor kidney function  Resume bicarb when she passed her swallowing  Insomnia  Will continue with trazodone    Plan of Care  Possible CCR placement    Plan of care discussed with patient and collaborating physician.                 Monika Oropeza, APRN-CNP

## 2024-09-23 NOTE — PROGRESS NOTES
Physical Therapy Evaluation & Treatment    Patient Name: Lisbeth Cruz  MRN: [id number]  Department: [address]  Room: [address] [place] [address]  Today's Date: 9/23/2024   Time Calculation  Start Time: 1119  Stop Time: 1142  Time Calculation (min): 23 min    Assessment/Plan   PT Assessment  PT Assessment Results: Decreased strength, Decreased endurance, Impaired balance, Decreased mobility, Decreased cognition, Impaired judgement, Decreased safety awareness  Rehab Prognosis: Good  Evaluation/Treatment Tolerance: Patient tolerated treatment well  Medical Staff Made Aware: Yes  End of Session Communication: Bedside nurse  Assessment Comment: 64 year old female admits with AMS, UTI, Multiple Myeloma, Metabolic Acidosis, CKD, HTN, Depression, Anxiety, Insomnia, and Thrombocytopenia. MRI + for CVA. On eval, she demonstrates weakness and impaired mobility warranting skilled PT care. At baseline, Pt does not use a device at home (confirmed by DTR) and only uses a Rollator for long Dx/ store shopping. For as active and mobile as Pt+family report for baseline, she demonstrates functional abilities well below this level post stroke. At DC, high intensity rehab is recommended. Pt is with a positive home setup/family support, has a good chance to DC from Acute Rehab back to home environment, and will be able to tolerate 3+ hours of daily therapy.  End of Session Patient Position: Bed, 3 rail up, Alarm on   IP OR SWING BED PT PLAN  Inpatient or Swing Bed: Inpatient  PT Plan  Treatment/Interventions: Bed mobility, Transfer training, Gait training, Balance training, Strengthening, Endurance training, Therapeutic exercise, Therapeutic activity, Home exercise program  PT Plan: Ongoing PT  PT Frequency: 5 times per week  PT Discharge Recommendations: High intensity level of continued care  Equipment Recommended upon Discharge: Wheeled walker  PT Recommended Transfer Status: Assist x1, Assistive device  PT - OK to Discharge: Yes      Subjective      General Visit Information:  General  Reason for Referral: Impaired Mobility-CVA  Referred By: HUSSEIN Kulkarni-CNP  Past Medical History Relevant to Rehab: anxiety, cardiomyopathy, HTN, dyspnea, chronic diastolic heart failure  Family/Caregiver Present: Yes  Caregiver Feedback: DTR  Prior to Session Communication: Bedside nurse  Patient Position Received: Bed, 3 rail up, Alarm on  Preferred Learning Style: verbal, visual  General Comment: 64 year old female admits with AMS, UTI, Multiple Myeloma, Metabolic Acidosis, CKD, HTN, Depression, Anxiety, Insomnia, and Thrombocytopenia. MRI + for CVA.  Home Living:  Home Living  Type of Home: [place]  Lives With: Adult children, Grandchildren  Home Adaptive Equipment:  (rollator)  Home Layout: Two level, Able to live on main level with bedroom/bathroom  Home Access: Stairs to enter without rails  Entrance Stairs-Rails: None  Entrance Stairs-Number of Steps: 1  Bathroom Shower/Tub: Walk-in shower  Bathroom Toilet: Handicapped height  Bathroom Equipment: Grab bars in shower, Shower chair with back  Prior Level of Function:  Prior Function Per Pt/Caregiver Report  Level of [place]: Independent with ADLs and functional transfers, Independent with homemaking with ambulation  Receives Help From: Family  ADL Assistance: Independent  Homemaking Assistance: Independent  Ambulatory Assistance: Independent  Hand Dominance: Right  Prior Function Comments: Pt and DTR report no AD in home but rollator long Dx outside/shopping with family. Denies falls  Precautions:  Precautions  Medical Precautions: Fall precautions    Vital Signs (Past 2hrs)        Date/Time Vitals Session Patient Position Pulse Resp SpO2 BP MAP (mmHg)    09/23/24 1133 --  --  74  17  100 %  157/79  97                        Objective   Pain:  Pain Assessment  Pain Assessment: 0-10  0-10 (Numeric) Pain Score: 0 - No pain  Cognition:  Cognition  Overall Cognitive Status: Impaired  Orientation Level: Disoriented  to time    General Assessments:  Activity Tolerance  Endurance: Decreased tolerance for upright activites  Activity Tolerance Comments: Max fatigue post gait today which is unusual for her. Typically can ambulate around store with family    Sensation  Light Touch:  (denies abn)    Strength  Strength Comments: Grossly 4/5 BLEs on MMT BUT 4-/5 B Hip flexion  Coordination  Movements are Fluid and Coordinated: No  Finger to Nose: Impaired (0/10 correct L hand)  Rapid Alternating Movements: Intact  Heel to Shin: Intact  Functional Assessments:  Bed Mobility  Bed Mobility: Yes  Bed Mobility 1  Bed Mobility 1: Supine to sitting  Level of Assistance 1: Close supervision  Bed Mobility Comments 1: Very laborous for Pt. HOB elevated. Takes >1 min to fully complete to EOB with cues on technique  Bed Mobility 2  Bed Mobility  2: Sitting to supine  Level of Assistance 2: Close supervision  Bed Mobility Comments 2: Very laborous back in bed. Does self complete but has a difficult time lifting BLEs back onto bed surface    Transfers  Transfer: Yes  Transfer 1  Transfer From 1: Bed to  Transfer to 1: Stand  Technique 1: Sit to stand, Stand to sit  Transfer Device 1:  (no AD)  Transfer Level of Assistance 1: Contact guard  Trials/Comments 1: Laborous for Pt but self completed. CGA for safety  Transfers 2  Transfer From 2: Bed to  Transfer to 2: Stand  Technique 2: Sit to stand, Stand to sit  Transfer Device 2: Walker  Transfer Level of Assistance 2: Contact guard  Trials/Comments 2: CGA for safety. Cues on technique and AD use    Ambulation/Gait Training  Ambulation/Gait Training Performed: Yes  Ambulation/Gait Training 1  Surface 1: Level tile  Device 1: No device  Assistance 1: Minimum assistance  Quality of Gait 1:  (Moderate unsteadiness, shuffled feet, kyphotic posture.)  Comments/Distance (ft) 1: Able to ambulate 2' forward before she reports feeling too unsteady to continue. Pt requesting walker use for further  gait  Ambulation/Gait Training 2  Surface 2: Level tile  Device 2: Rolling walker  Assistance 2: Contact guard  Quality of Gait 2:  (slow, shuffled, kyphotic. Mild lateral sway without LOB. Max  fatigue reported at end of trial)  Comments/Distance (ft) 2: 25'    Treatments:  Ambulation/Gait Training  Ambulation/Gait Training Performed: Yes  Ambulation/Gait Training 1  Surface 1: Level tile  Device 1: No device  Assistance 1: Minimum assistance  Quality of Gait 1:  (Moderate unsteadiness, shuffled feet, kyphotic posture.)  Comments/Distance (ft) 1: Able to ambulate 2' forward before she reports feeling to unsteady to continue. Pt requesting walker use for further gait  Ambulation/Gait Training 2  Surface 2: Level tile  Device 2: Rolling walker  Assistance 2: Contact guard  Quality of Gait 2:  (slow, shuffled, kyphotic. Mild lateral sway without LOB. Max  fatigue reported at end of trial)  Comments/Distance (ft) 2: 25'    Education as below    Outcome Measures:  [facility] Basic Mobility  Turning from your back to your side while in a flat bed without using bedrails: A little  Moving from lying on your back to sitting on the side of a flat bed without using bedrails: A little  Moving to and from bed to chair (including a wheelchair): A little  Standing up from a chair using your arms (e.g. wheelchair or bedside chair): A little  To walk in hospital room: A little  Climbing 3-5 steps with railing: Total  Basic Mobility - Total Score: 16    Encounter Problems       Encounter Problems (Active)       Balance       Standing Balance (Progressing)       Start:  09/23/24    Expected End:  10/07/24       Pt will demonstrate good static standing balance to promote safe participation with out of bed activity, transfers, and mobility              Mobility       Ambulation STG (Progressing)       Start:  09/23/24    Expected End:  10/07/24       Pt will ambulate 50' modified independent assist with walker to promote safe home  mobility           Ambulation LTG (Progressing)       Start:  09/23/24    Expected End:  10/07/24       Pt will ambulate 250' modified independent assist with walker to promote safe home mobility              PT Transfers       Supine to sit (Progressing)       Start:  09/23/24    Expected End:  10/07/24       Pt will transfer supine to sitting at edge of bed with modified independent assist to promote acute care out of bed activity           Sit to stand (Progressing)       Start:  09/23/24    Expected End:  10/07/24       Pt will transfer sit to standing position with modified independent assist and walker to promote safe out of bed activity              Safety       Safe Mobility Techniques (Progressing)       Start:  09/23/24    Expected End:  10/07/24       Pt will correctly identify and demonstrate safe mobility techniques to reduce their risks for falls during their acute care stay                   Education Documentation  Mobility Training, taught by Cesar Brown, PT at 9/23/2024 11:57 AM.  Learner: Family, Patient  Readiness: Acceptance  Method: Explanation, Demonstration  Response: Needs Reinforcement  Comment: safe mobility techniques, importance of OOB activity, risks of prolonged bedrest    Education Comments  No comments found.

## 2024-09-24 LAB
ANION GAP SERPL CALCULATED.3IONS-SCNC: 11 MMOL/L (ref 10–20)
BACTERIA UR CULT: ABNORMAL
BUN SERPL-MCNC: 13 MG/DL (ref 6–23)
CALCIUM SERPL-MCNC: 8.3 MG/DL (ref 8.6–10.3)
CHLORIDE SERPL-SCNC: 111 MMOL/L (ref 98–107)
CO2 SERPL-SCNC: 19 MMOL/L (ref 21–32)
CREAT SERPL-MCNC: 1.58 MG/DL (ref 0.5–1.05)
EGFRCR SERPLBLD CKD-EPI 2021: 36 ML/MIN/1.73M*2
ERYTHROCYTE [DISTWIDTH] IN BLOOD BY AUTOMATED COUNT: 16.7 % (ref 11.5–14.5)
GLUCOSE SERPL-MCNC: 76 MG/DL (ref 74–99)
HCT VFR BLD AUTO: 29.5 % (ref 36–46)
HGB BLD-MCNC: 9.5 G/DL (ref 12–16)
MCH RBC QN AUTO: 31.9 PG (ref 26–34)
MCHC RBC AUTO-ENTMCNC: 32.2 G/DL (ref 32–36)
MCV RBC AUTO: 99 FL (ref 80–100)
NRBC BLD-RTO: 0 /100 WBCS (ref 0–0)
PLATELET # BLD AUTO: 109 X10*3/UL (ref 150–450)
POTASSIUM SERPL-SCNC: 3.7 MMOL/L (ref 3.5–5.3)
RBC # BLD AUTO: 2.98 X10*6/UL (ref 4–5.2)
SODIUM SERPL-SCNC: 137 MMOL/L (ref 136–145)
WBC # BLD AUTO: 4.2 X10*3/UL (ref 4.4–11.3)

## 2024-09-24 PROCEDURE — G0407 INPT/TELE FOLLOW UP 25: HCPCS | Performed by: STUDENT IN AN ORGANIZED HEALTH CARE EDUCATION/TRAINING PROGRAM

## 2024-09-24 PROCEDURE — 2500000001 HC RX 250 WO HCPCS SELF ADMINISTERED DRUGS (ALT 637 FOR MEDICARE OP): Performed by: NURSE PRACTITIONER

## 2024-09-24 PROCEDURE — 94760 N-INVAS EAR/PLS OXIMETRY 1: CPT

## 2024-09-24 PROCEDURE — 97110 THERAPEUTIC EXERCISES: CPT | Mod: GP

## 2024-09-24 PROCEDURE — 92507 TX SP LANG VOICE COMM INDIV: CPT | Mod: GN

## 2024-09-24 PROCEDURE — 97116 GAIT TRAINING THERAPY: CPT | Mod: GP

## 2024-09-24 PROCEDURE — 2060000001 HC INTERMEDIATE ICU ROOM DAILY

## 2024-09-24 PROCEDURE — 85027 COMPLETE CBC AUTOMATED: CPT | Performed by: NURSE PRACTITIONER

## 2024-09-24 PROCEDURE — 82374 ASSAY BLOOD CARBON DIOXIDE: CPT | Performed by: NURSE PRACTITIONER

## 2024-09-24 PROCEDURE — 2500000001 HC RX 250 WO HCPCS SELF ADMINISTERED DRUGS (ALT 637 FOR MEDICARE OP): Performed by: STUDENT IN AN ORGANIZED HEALTH CARE EDUCATION/TRAINING PROGRAM

## 2024-09-24 PROCEDURE — 2500000004 HC RX 250 GENERAL PHARMACY W/ HCPCS (ALT 636 FOR OP/ED): Performed by: NURSE PRACTITIONER

## 2024-09-24 RX ORDER — CLOPIDOGREL BISULFATE 75 MG/1
75 TABLET ORAL DAILY
Status: DISCONTINUED | OUTPATIENT
Start: 2024-09-24 | End: 2024-09-25 | Stop reason: HOSPADM

## 2024-09-24 RX ADMIN — SODIUM BICARBONATE 650 MG TABLET 650 MG: at 08:51

## 2024-09-24 RX ADMIN — ASPIRIN 81 MG: 81 TABLET, COATED ORAL at 08:53

## 2024-09-24 RX ADMIN — CLOPIDOGREL BISULFATE 75 MG: 75 TABLET ORAL at 14:19

## 2024-09-24 RX ADMIN — Medication 2000 UNITS: at 06:09

## 2024-09-24 RX ADMIN — CEFTRIAXONE SODIUM 1 G: 1 INJECTION, SOLUTION INTRAVENOUS at 08:42

## 2024-09-24 RX ADMIN — ACYCLOVIR 200 MG: 200 CAPSULE ORAL at 08:51

## 2024-09-24 RX ADMIN — SODIUM BICARBONATE 650 MG TABLET 650 MG: at 21:09

## 2024-09-24 RX ADMIN — CYANOCOBALAMIN TAB 1000 MCG 1000 MCG: 1000 TAB at 06:09

## 2024-09-24 RX ADMIN — TRAZODONE HYDROCHLORIDE 100 MG: 100 TABLET ORAL at 21:09

## 2024-09-24 RX ADMIN — ACYCLOVIR 200 MG: 200 CAPSULE ORAL at 21:09

## 2024-09-24 RX ADMIN — ATORVASTATIN CALCIUM 40 MG: 40 TABLET, FILM COATED ORAL at 08:51

## 2024-09-24 ASSESSMENT — COGNITIVE AND FUNCTIONAL STATUS - GENERAL
CLIMB 3 TO 5 STEPS WITH RAILING: A LOT
MOBILITY SCORE: 19
MOVING TO AND FROM BED TO CHAIR: A LITTLE
STANDING UP FROM CHAIR USING ARMS: A LITTLE
TURNING FROM BACK TO SIDE WHILE IN FLAT BAD: A LITTLE
MOVING TO AND FROM BED TO CHAIR: A LITTLE
WALKING IN HOSPITAL ROOM: A LITTLE
WALKING IN HOSPITAL ROOM: A LITTLE
TURNING FROM BACK TO SIDE WHILE IN FLAT BAD: A LITTLE
CLIMB 3 TO 5 STEPS WITH RAILING: A LITTLE
MOBILITY SCORE: 18
STANDING UP FROM CHAIR USING ARMS: A LITTLE

## 2024-09-24 ASSESSMENT — PAIN SCALES - GENERAL
PAINLEVEL_OUTOF10: 0 - NO PAIN

## 2024-09-24 ASSESSMENT — PAIN - FUNCTIONAL ASSESSMENT
PAIN_FUNCTIONAL_ASSESSMENT: 0-10

## 2024-09-24 NOTE — PROGRESS NOTES
Bishnu Santos is a 64 y.o. female on day 3 of admission presenting with No Principal Problem: There is no principal problem currently on the Problem List. Please update the Problem List and refresh..      Subjective   Patient resting in bed comfortably, denies pain or sob.        Objective     Last Recorded Vitals  BP (!) 125/100 (BP Location: Left arm, Patient Position: Lying)   Pulse 71   Temp 36.8 °C (98.2 °F) (Temporal)   Resp 15   Wt 59 kg (130 lb 1.6 oz)   SpO2 98%   Intake/Output last 3 Shifts:    Intake/Output Summary (Last 24 hours) at 9/24/2024 1022  Last data filed at 9/24/2024 1011  Gross per 24 hour   Intake 300 ml   Output 150 ml   Net 150 ml       Admission Weight  Weight: 58.3 kg (128 lb 8.5 oz) (09/22/24 0339)    Daily Weight  09/24/24 : 59 kg (130 lb 1.6 oz)    Image Results  ECG 12 lead  Normal sinus rhythm  Normal ECG  No previous ECGs available  Confirmed by Mario Alberto Hammond ([id number]) on 9/23/2024 12:11:39 PM  Transthoracic Echo (TTE) [place] [address]              Phone [phone]    TRANSTHORACIC ECHOCARDIOGRAM REPORT    Patient Name:     BISHNU SANTOS      Reading Physician:   [id number] Enzo Bradley DO  Study Date:       9/23/2024            Ordering Provider:   [id number] SANDRA VERNON  MRN/PID:          [id number]             Fellow:  Accession#:       [id number]         Nurse:  Date of           1960 / 64 years Sonographer:         Monika Ramos RDCS  Birth/Age:  Gender:           F                    Additional Staff:  Height:           154.94 cm            Admit Date:  Weight:           68.95 kg             Admission Status:    Inpatient - Routine  BSA / BMI:        1.68 m2 / 28.72      Department Location: [facility]                    kg/m2  Blood Pressure: 124 /50 mmHg    Study Type:    TRANSTHORACIC ECHO (TTE) COMPLETE  Diagnosis/ICD: Other  amyloidosis-E85.8  CPT Codes:     Echo Complete w Full Doppler-49731    Patient History:  Pertinent History: HTN, cardiomyopathy, CKD, CHF.    Study Detail: The following Echo studies were performed: 2D, M-Mode, Doppler and                color flow. Technically challenging study due to body habitus.                Agitated saline used as a contrast agent for intraseptal flow                evaluation.       PHYSICIAN INTERPRETATION:  Left Ventricle: Left ventricular ejection fraction is normal, by visual estimate at 60-65%. There are no regional wall motion abnormalities. The left ventricular cavity size is normal. Spectral Doppler shows an impaired relaxation pattern of left ventricular diastolic filling.  Left Atrium: The left atrium is mildly dilated. A bubble study using agitated saline was performed.  Right Ventricle: The right ventricle is normal in size. There is normal right ventricular global systolic function.  Right Atrium: The right atrium is normal in size.  Aortic Valve: The aortic valve appears structurally normal. The aortic valve dimensionless index is 0.80. There is no evidence of aortic valve regurgitation. The peak instantaneous gradient of the aortic valve is 11.2 mmHg. The mean gradient of the aortic valve is 5.0 mmHg.  Mitral Valve: The mitral valve is normal in structure. There is no evidence of mitral valve regurgitation.  Tricuspid Valve: The tricuspid valve is structurally normal. There is trace to mild tricuspid regurgitation. The Doppler estimated RVSP is within normal limits at 28.4 mmHg.  Pulmonic Valve: The pulmonic valve is structurally normal. There is no indication of pulmonic valve regurgitation.  Pericardium: No pericardial effusion noted.  Aorta: The aortic root is normal.       CONCLUSIONS:   1. Left ventricular ejection fraction is normal, by visual estimate at 60-65%.   2. Spectral Doppler shows an impaired relaxation pattern of left ventricular diastolic filling.   3. There  is normal right ventricular global systolic function.   4. Right ventricular within normal limits.    QUANTITATIVE DATA SUMMARY:     2D MEASUREMENTS:           Normal Ranges:  LAs:             4.20 cm   (2.7-4.0cm)  IVSd:            0.65 cm   (0.6-1.1cm)  LVPWd:           1.15 cm   (0.6-1.1cm)  LVIDd:           4.24 cm   (3.9-5.9cm)  LVIDs:           2.25 cm  LV Mass Index:   71.8 g/m2  LV % FS          46.9 %       LA VOLUME:                    Normal Ranges:  LA Vol A4C:        64.9 ml    (22+/-6mL/m2)  LA Vol A2C:        66.9 ml  LA Vol BP:         68.9 ml  LA Vol Index A4C:  38.6ml/m2  LA Vol Index A2C:  39.8 ml/m2  LA Vol Index BP:   41.0 ml/m2  LA Area A4C:       21.0 cm2  LA Area A2C:       22.3 cm2  LA Major Axis A4C: 5.8 cm  LA Major Axis A2C: 6.3 cm  LA Volume Index:   38.8 ml/m2  LA Vol A4C:        61.4 ml  LA Vol A2C:        63.2 ml  LA Vol Index BSA:  37.1 ml/m2       RA VOLUME BY A/L METHOD:            Normal Ranges:  RA Vol A4C:              18.8 ml    (8.3-19.5ml)  RA Vol Index A4C:        11.2 ml/m2  RA Area A4C:             10.2 cm2  RA Major Axis A4C:       4.7 cm       M-MODE MEASUREMENTS:         Normal Ranges:  Ao Root:             2.80 cm (2.0-3.7cm)  LAs:                 4.60 cm (2.7-4.0cm)       AORTA MEASUREMENTS:         Normal Ranges:  Ao Sinus, d:        2.88 cm (2.1-3.5cm)  Ao STJ, d:          2.76 cm (1.7-3.4cm)  Asc Ao, d:          2.80 cm (2.1-3.4cm)       LV SYSTOLIC FUNCTION BY 2D PLANIMETRY (MOD):                       Normal Ranges:  EF-A4C View:    61 % (>=55%)  EF-A2C View:    49 %  EF-Biplane:     55 %  EF-Visual:      63 %  LV EF Reported: 63 %       LV DIASTOLIC FUNCTION:           Normal Ranges:  MV Peak E:             0.53 m/s  (0.7-1.2 m/s)  MV Peak A:             0.87 m/s  (0.42-0.7 m/s)  E/A Ratio:             0.61      (1.0-2.2)  MV e'                  0.055 m/s (>8.0)  MV lateral e'          0.06 m/s  MV medial e'           0.05 m/s  E/e' Ratio:            9.58       (<8.0)       MITRAL VALVE:          Normal Ranges:  MV DT:        225 msec (150-240msec)       AORTIC VALVE:                      Normal Ranges:  AoV Vmax:                1.67 m/s  (<=1.7m/s)  AoV Peak P.2 mmHg (<20mmHg)  AoV Mean P.0 mmHg  (1.7-11.5mmHg)  LVOT Max Kevin:            1.31 m/s  (<=1.1m/s)  AoV VTI:                 31.10 cm  (18-25cm)  LVOT VTI:                24.80 cm  LVOT Diameter:           2.00 cm   (1.8-2.4cm)  AoV Area, VTI:           2.51 cm2  (2.5-5.5cm2)  AoV Area,Vmax:           2.46 cm2  (2.5-4.5cm2)  AoV Dimensionless Index: 0.80       RIGHT VENTRICLE:  RV Basal 2.73 cm  RV Mid   2.43 cm  RV Major 6.2 cm  TAPSE:   23.6 mm  RV s'    0.07 m/s       TRICUSPID VALVE/RVSP:          Normal Ranges:  Peak TR Velocity:     2.52 m/s  RV Syst Pressure:     28 mmHg  (< 30mmHg)  IVC Diam:             1.85 cm       [id number] Enzo Bradley DO  Electronically signed on 2024 at 11:52:08 AM       ** Final **      Physical Exam  Constitutional:       Appearance: Normal appearance.   Cardiovascular:      Rate and Rhythm: Normal rate and regular rhythm.      Pulses: Normal pulses.      Heart sounds: Normal heart sounds.   Pulmonary:      Effort: Pulmonary effort is normal.      Breath sounds: Normal breath sounds.   Abdominal:      General: Bowel sounds are normal.      Palpations: Abdomen is soft.   Musculoskeletal:         General: Normal range of motion.   Skin:     General: Skin is warm and dry.   Neurological:      Mental Status: She is alert and oriented to person, place, and time.      Motor: Weakness present.      Comments: Left sided weakness         Relevant Results             Results for orders placed or performed during the hospital encounter of 24 (from the past 24 hour(s))   CBC   Result Value Ref Range    WBC 4.2 (L) 4.4 - 11.3 x10*3/uL    nRBC 0.0 0.0 - 0.0 /100 WBCs    RBC 2.98 (L) 4.00 - 5.20 x10*6/uL    Hemoglobin 9.5 (L) 12.0 - 16.0 g/dL    Hematocrit  29.5 (L) 36.0 - 46.0 %    MCV 99 80 - 100 fL    MCH 31.9 26.0 - 34.0 pg    MCHC 32.2 32.0 - 36.0 g/dL    RDW 16.7 (H) 11.5 - 14.5 %    Platelets 109 (L) 150 - 450 x10*3/uL   Basic Metabolic Panel   Result Value Ref Range    Glucose 76 74 - 99 mg/dL    Sodium 137 136 - 145 mmol/L    Potassium 3.7 3.5 - 5.3 mmol/L    Chloride 111 (H) 98 - 107 mmol/L    Bicarbonate 19 (L) 21 - 32 mmol/L    Anion Gap 11 10 - 20 mmol/L    Urea Nitrogen 13 6 - 23 mg/dL    Creatinine 1.58 (H) 0.50 - 1.05 mg/dL    eGFR 36 (L) >60 mL/min/1.73m*2    Calcium 8.3 (L) 8.6 - 10.3 mg/dL       Assessment/Plan        This patient has a central line   Reason for the central line remaining today? Parenteral medication            Assessment & Plan  CVA (cerebral vascular accident) (Multi)  Positive for CVA, lacunar infarct right frontal lobe  Possible CCR post discharge  Continue lipitor and aspirin  Echo pending  Primary hypertension  Not on any medication well-controlled with diet  Major depressive disorder  Resume Zoloft when patient passed her swallowing eval  Anxiety  Continue with the same regimen  Other amyloidosis (Multi)  Patient was having diarrhea and taking Imodium daily to control it  Stage 4 chronic kidney disease (Multi)  Monitor kidney function  Resume bicarb when she passed her swallowing  Insomnia  Will continue with trazodone    Plan of Care  Possible CCR placement    Plan of care discussed with patient and collaborating physician.                 Monika Oropeza, APRN-CNP

## 2024-09-24 NOTE — PROGRESS NOTES
Speech-Language Pathology    SLP Adult Inpatient Speech-Language Cognition    Patient Name: Lisbeth Cruz  MRN: [id number]  Today's Date: 9/24/2024   Time Calculation  Start Time: 1325  Stop Time: 1352  Time Calculation (min): 27 min     1/2 sp    Current Problem:   1. Cerebrovascular accident (CVA), unspecified mechanism (Multi)        2. Hypertension, unspecified type        3. Acute lower urinary tract infection        4. Other amyloidosis (Multi)  Transthoracic Echo (TTE) Complete    Transthoracic Echo (TTE) Complete        Past Medical History:   Diagnosis Date    Anxiety 09/21/2024    History of gastric bypass 02/24/2022    Remote history (1988) but has had chronic nausea since      Insomnia 09/21/2024    Major depressive disorder 09/21/2024    Multiple myeloma not having achieved remission (Multi) 09/21/2024    Other amyloidosis (Multi) 09/21/2024    Primary hypertension 09/21/2024    Renal mass     Stage 4 chronic kidney disease (Multi) 09/21/2024       SLP Assessment:  SLP Assessment  SLP TX Intervention Outcome: Making Progress Towards Goals  SLP Assessment Results: Cognitive Deficits  Treatment Tolerance: Patient tolerated treatment well  Medical Staff Made Aware: Yes      SLP Plan:  Plan  Inpatient/Swing Bed or Outpatient: Inpatient  Treatment/Interventions: Cognitive communication functioning  SLP Plan: Skilled SLP  SLP Frequency: 3x per week  Duration: 2 weeks  Next Treatment Priority: Reading comrehension, STM, orientation  Discussed POC: Patient  Discussed Risks/Benefits: Yes  Patient/Caregiver Agreeable: Yes      Subjective   Pt pleasant and cooperative, reclined in bed for session.    General Visit Information:  General Information  Referred By: LINDA Kulkarni  Past Medical History Relevant to Rehab: anxiety, cardiomyopathy, HTN, dyspnea, chronic diastolic heart failure  Prior to Session Communication: Bedside nurse      Objective       Pain:  Pain Assessment  Pain Assessment: 0-10  0-10  (Numeric) Pain Score: 0 - No pain      Cognition:  Cognition  Overall Cognitive Status: Impaired  Orientation Level: Disoriented to time  Problem Solving: Exceptions to WFL  Verbal Reasoning Skills: Impaired  Numeric Reasoning: Exceptions to WFL   Complex Calculations: Impaired  Processing Speed: Delayed      Motor Speech Production:  Motor Speech Production  Assessments Used: QAB  Oral Motor : WFL  Automatic Speech: WFL  Repetition: WFL  Intelligibility: WFL    Auditory Comprehension:   Auditory Comprehension  Yes/No Questions: Within Functional Limits  Commands: Within Functional Limits  Conversation: Within Functional Limits    Reading Comprehension:  Reading Comprehension  Reading Status: Exceptions to WFL  Words : Impairment  Sentence : Impaired  Interfering Components: Processing time    Verbal:  Verbal Expression  Primary Mode of Expression: Verbal  Primary Language: English  Repetition: Within Functional Limits    Inpatient Education:  Individual(s) Educated: Patient  Verbal Education : Results and recommendations  Risk and Benefits Discussed with Patient/Caregiver/Other: yes  Patient/Caregiver Demonstrated Understanding: yes  Plan of Care Discussed and Agreed Upon: yes  Patient Response to Education: Patient/Caregiver Verbalized Understanding of Information

## 2024-09-24 NOTE — PROGRESS NOTES
Subjective:  Patient seen virtually today.    Pt continues to have R sided weakness. Walking with walker now which is new from baseline.     MRI cervical was done, pending radiology read -- some mild degenerative changes but without abnormal cord signals.      Objective:  Exam:  Vitals:    09/24/24 0739   BP: (!) 125/100   Pulse: 71   Resp: 15   Temp: 36.8 °C (98.2 °F)   SpO2: 98%     Exam performed virtually via video.  Laying in bed, in NAD  EOMI, face with mild NL fold in R   Drift in RUE and RLE   Sensation intact to light touch x4     NIHSS 3      ASSESSMENT:  Lisbeth Cruz is a 64 y.o. female with hx of multiple myeloma, now with R sided weakness and trouble getting out of bed, stuttering sx throughout the day of presentation, now improved somewhat. Given hx of afib pt had stroke eval and was found with punctate R frontal infarct, which would not explain her sx.      9/24/2024: MRI cervical obtained, noted degenerative changes without obvious abnormal cord signal, pending radiology read,      Diagnosis:   R sided weakness not explained by infarct   Incidental R frontal punctate infarct      RECOMMENDATIONS:   - continue aspirin, will start plavix, plan for 21 days   - A1c, echo normal EF, mildly dilated LA size, bubble study no mention of results, LDL 98   - continue high intensity statin   - although at some point I was told pt has hx of afib, I am unable to confirm this in chart, it is not listed in her PCP note-- I would simply recommend ziopatch on discharge   - neurochecks q8h and telemonitoring   - will wait on final radiology read on MRI C spine   - PT/OT/SLP as appropriate      Follow up with me as outpatient in 4-6 weeks      Consult completed by: TELEPHONE and VIDEO interactive communication was used to provide this telehealth service. Time includes consultation with ED provider and extensive review of data- history, medical records, lab/radiology/medical test results, neuroimaging studies:  35  minutes was spent in FOLLOW-UP telehealth consultation

## 2024-09-24 NOTE — NURSING NOTE
Vascular Access Nurse Note    VAN rounding complete. Patient medport evaluated. Dressing CDI and occlusive. Flushed X 1, positive Blood return noted. Curos cap applied.

## 2024-09-24 NOTE — PROGRESS NOTES
Occupational Therapy                 Therapy Communication Note    Patient Name: Lisbeth Cruz  MRN: [id number]  Department: [address]  Room: [address] [place] [address]  Today's Date: 9/24/2024     Discipline: Occupational Therapy    Missed Visit Reason:      Missed Time: Attempt    Comment: 14:25  Attempted to see pt for treatment session, per nurseRosalie, pt just returned to bed and wanting to rest after being up for a couple of hours.

## 2024-09-24 NOTE — PROGRESS NOTES
Speech-Language Pathology    SLP Adult Inpatient  Speech-Language Pathology Treatment     Patient Name: Lisbeth Cruz  MRN: [id number]  Today's Date: 9/24/2024  Time Calculation  Start Time: 1325  Stop Time: 1352  Time Calculation (min): 27 min     1/2 sp    Current Problem:   1. Cerebrovascular accident (CVA), unspecified mechanism (Multi)        2. Hypertension, unspecified type        3. Acute lower urinary tract infection        4. Other amyloidosis (Multi)  Transthoracic Echo (TTE) Complete    Transthoracic Echo (TTE) Complete            SLP Assessment:  SLP TX Intervention Outcome: Making Progress Towards Goals  Treatment Tolerance: Patient tolerated treatment well  Medical Staff Made Aware: Yes      Plan:  Inpatient/Swing Bed or Outpatient: Inpatient  Treatment/Interventions: Cognitive communication functioning  SLP Plan: Skilled SLP  SLP Frequency: 3x per week  Duration: 2 weeks  Next Treatment Priority: Reading comrehension, STM, orientation  Discussed POC: Patient  Discussed Risks/Benefits: Yes  Patient/Caregiver Agreeable: Yes      Subjective   Pt pleasant and cooperative, reclined in bed for session.      General Visit Information:  Past Medical History Relevant to Rehab: anxiety, cardiomyopathy, HTN, dyspnea, chronic diastolic heart failure      Pain Assessment:  Pain Assessment  Pain Assessment: 0-10  0-10 (Numeric) Pain Score: 0 - No pain      Objective   Cognitive Linguistics:  Cognitive Linguistics Interventions: Orientation, problem solving    Language Comprehension:  Language Comprehension Interventions: Understanding Written Language    Goals:  (Established 9/23/24, projected discontinuation 2 weeks)     - Pt will complete functional reading tasks at phrase level with 90% acc to improve ability to use external visual supports.   CURRENT STATUS: 85%  PROGRESS: Improvement from baseline.  Pt able to see better without glasses on. (Glasses she has with her are for distance)    - Pt will express  temporal orientation with use of visual supports with 100% acc in order to improve ability to track time and plan for future events.    CURRENT STATUS: 100% acc with use of visual supports   PROGRESS: Improved significantly from baseline    - Pt will complete functional problem solving tasks with 80% acc in order to make decisions about care needs and participate safely in ADLs.   CURRENT STATUS: 70%   PROGRESS: Improved ability to focus and respond from initial eval    - Ongoing assessment as indicated for further goal development.    CURRENT STATUS: Not addressed   PROGRESS: N/A      Inpatient Education:  Individual(s) Educated: Patient  Verbal Education : Results and recommendations, possibility of discharge to acute rehab  Risk and Benefits Discussed with Patient/Caregiver/Other: yes  Patient/Caregiver Demonstrated Understanding: yes  Plan of Care Discussed and Agreed Upon: yes  Patient Response to Education: Patient/Caregiver Verbalized Understanding of Information

## 2024-09-24 NOTE — PROGRESS NOTES
Speech-Language Pathology                 Therapy Communication Note    Patient Name: Lisbeth Cruz  MRN: [id number]  Department: [facility]  Room: [address] [place], [place] [address]  Today's Date: 9/24/2024     Discipline: Speech Language Pathology    Missed Visit Reason:  Pt scheduled with SLP Services for follow-up Speech-Language Treatment  Unable to complete at this time due to pt meeting with liaison from acute rehab facility.  Will attempt later as schedule allows.      Missed Time: Attempt

## 2024-09-24 NOTE — PROGRESS NOTES
Physical Therapy    Physical Therapy Treatment    Patient Name: Lisbeth Cruz  MRN: [id number]  Department: [address]  Room: [address] [place] [address]  Today's Date: 9/24/2024  Time Calculation  Start Time: 1625  Stop Time: 1650  Time Calculation (min): 25 min         Assessment/Plan   PT Assessment  PT Assessment Results: Decreased strength, Decreased endurance, Impaired balance, Decreased mobility, Impaired judgement, Decreased safety awareness  Rehab Prognosis: Good  Barriers to Discharge: assist and AD required for safe mobility  Evaluation/Treatment Tolerance: Patient tolerated treatment well  Medical Staff Made Aware: Yes  Strengths: Premorbid level of function  Barriers to Participation: Comorbidities  End of Session Communication: Bedside nurse  Assessment Comment: Pt w/ improved actiivty tolerance today, gives good effort, still requires assist and AD, cues for safety and technique.  End of Session Patient Position: Up in chair, Alarm on (set up w/ dinner tray, call light in reach.)  PT Plan  Inpatient/Swing Bed or Outpatient: Inpatient  PT Plan  Treatment/Interventions: Bed mobility, Transfer training, Gait training, Balance training, Strengthening, Endurance training, Therapeutic exercise, Therapeutic activity  PT Plan: Ongoing PT  PT Frequency: 5 times per week  PT Discharge Recommendations: High intensity level of continued care  Equipment Recommended upon Discharge: Wheeled walker  PT Recommended Transfer Status: Assist x1, Assistive device  PT - OK to Discharge: Yes      General Visit Information:   PT  Visit  PT Received On: 09/24/24  General  Reason for Referral: Impaired Mobility-CVA  Referred By: LINDA Kulkarni  Past Medical History Relevant to Rehab: anxiety, cardiomyopathy, HTN, dyspnea, chronic diastolic heart failure  Family/Caregiver Present: No  Prior to Session Communication: Bedside nurse  Patient Position Received: Bed, 3 rail up, Alarm off, not on at start of session  Preferred Learning Style:  verbal, visual  General Comment: Pt agreeable to PT session, reports was up to chair ~ 1/2 hour earlier today, not up to bathroom today, using purewick.    Subjective   Precautions:  Precautions  Medical Precautions: Fall precautions    Objective   Pain:  Pain Assessment  Pain Assessment: 0-10  0-10 (Numeric) Pain Score: 0 - No pain  Cognition:  Cognition  Cognition Comments: pleasant, cooperative  Insight: Mild  Coordination:  Coordination Comment: slow  Postural Control:  Postural Control  Posture Comment: forward head, rounded shoulders  Dynamic Standing Balance  Dynamic Standing-Balance Support: Bilateral upper extremity supported  Dynamic Standing-Level of Assistance: Contact guard  Dynamic Standing-Balance: Turning (amb)  Dynamic Standing-Comments: Fair- (requires use of RW, no LOB)    Activity Tolerance:  Activity Tolerance  Endurance: Other (Comment) (improving)  Activity Tolerance Comments: Increased amb distance, some fatigue noted.  Treatments:  Therapeutic Exercise  Therapeutic Exercise Performed: Yes  Therapeutic Exercise Activity 1: Pt performed seated bilat ankle pumps, LAQs, hip flexion glutes sets, harriet hip add and abduciton x 15 reps.    Bed Mobility 1  Bed Mobility 1: Supine to sitting  Level of Assistance 1: Close supervision  Bed Mobility Comments 1: to Lt EOB, HOB elevated slightly    Ambulation/Gait Training 1  Surface 1: Level tile  Device 1: Rolling walker  Assistance 1: Contact guard  Comments/Distance (ft) 1: Pt amb ~ 50' x 3, slow pace, cues for toe clearance/heel strike Rt, erect posture and proximity to RW. Cues to keep RW in use until ready to sit down.  Transfer 1  Technique 1: Sit to stand, Stand to sit  Transfer Device 1: Walker  Transfer Level of Assistance 1: Contact guard  Trials/Comments 1: From EOB, to/from chair w/ arms, cues for safe hand placement.    Outcome Measures:  [facility] Basic Mobility  Turning from your back to your side while in a flat bed without using bedrails:  None  Moving from lying on your back to sitting on the side of a flat bed without using bedrails: A little  Moving to and from bed to chair (including a wheelchair): A little  Standing up from a chair using your arms (e.g. wheelchair or bedside chair): A little  To walk in hospital room: A little  Climbing 3-5 steps with railing: A lot  Basic Mobility - Total Score: 18    Education Documentation  Home Exercise Program, taught by Malika Villar PT at 9/24/2024  6:10 PM.  Learner: Patient  Readiness: Acceptance  Method: Explanation  Response: Needs Reinforcement    Mobility Training, taught by Malika Villar PT at 9/24/2024  6:10 PM.  Learner: Patient  Readiness: Acceptance  Method: Explanation  Response: Needs Reinforcement    Education Comments  No comments found.        OP EDUCATION:       Encounter Problems       Encounter Problems (Active)       Balance       Standing Balance (Progressing)       Start:  09/23/24    Expected End:  10/07/24       Pt will demonstrate good static standing balance to promote safe participation with out of bed activity, transfers, and mobility              Mobility       Ambulation STG (Progressing)       Start:  09/23/24    Expected End:  10/07/24       Pt will ambulate 50' modified independent assist with walker to promote safe home mobility           Ambulation LTG (Progressing)       Start:  09/23/24    Expected End:  10/07/24       Pt will ambulate 250' modified independent assist with walker to promote safe home mobility              PT Transfers       Supine to sit (Progressing)       Start:  09/23/24    Expected End:  10/07/24       Pt will transfer supine to sitting at edge of bed with modified independent assist to promote acute care out of bed activity           Sit to stand (Progressing)       Start:  09/23/24    Expected End:  10/07/24       Pt will transfer sit to standing position with modified independent assist and walker to promote safe out of bed activity               Safety       Safe Mobility Techniques (Progressing)       Start:  09/23/24    Expected End:  10/07/24       Pt will correctly identify and demonstrate safe mobility techniques to reduce their risks for falls during their acute care stay

## 2024-09-24 NOTE — CARE PLAN
The patient's goals for the shift include  go to chemo appointment on Thursday/discharge to Acute rehab tomorrow    The clinical goals for the shift include Remain hemodynamically stable    Over the shift, the patient did make progress toward the following goals. Barriers to progression include none.  Recommendations to address these barriers include education and following plan of care.

## 2024-09-24 NOTE — PROGRESS NOTES
09/24/24 1037   Discharge Planning   Home or Post Acute Services Post acute facilities (Rehab/SNF/etc)   Type of Post Acute Facility Services Rehab  ([facility] reviewing for bed availability.  May now have one.  If not, send referral to [facility].  Awaiting updates from [facility].  No precert needed.)   Expected Discharge Disposition Rehab   Does the patient need discharge transport arranged? Yes   RoundTrip coordination needed? Yes   Has discharge transport been arranged? No

## 2024-09-25 ENCOUNTER — HOSPITAL ENCOUNTER (INPATIENT)
Facility: HOSPITAL | Age: 64
DRG: 057 | End: 2024-09-25
Attending: INTERNAL MEDICINE | Admitting: INTERNAL MEDICINE
Payer: MEDICARE

## 2024-09-25 VITALS
DIASTOLIC BLOOD PRESSURE: 78 MMHG | SYSTOLIC BLOOD PRESSURE: 130 MMHG | OXYGEN SATURATION: 100 % | BODY MASS INDEX: 24.73 KG/M2 | WEIGHT: 131 LBS | HEART RATE: 70 BPM | TEMPERATURE: 96.6 F | RESPIRATION RATE: 16 BRPM | HEIGHT: 61 IN

## 2024-09-25 DIAGNOSIS — I63.429 CEREBROVASCULAR ACCIDENT (CVA) DUE TO EMBOLISM OF ANTERIOR CEREBRAL ARTERY, UNSPECIFIED BLOOD VESSEL LATERALITY (MULTI): ICD-10-CM

## 2024-09-25 DIAGNOSIS — I63.9 ISCHEMIC STROKE (MULTI): Primary | ICD-10-CM

## 2024-09-25 PROCEDURE — 2500000001 HC RX 250 WO HCPCS SELF ADMINISTERED DRUGS (ALT 637 FOR MEDICARE OP): Performed by: NURSE PRACTITIONER

## 2024-09-25 PROCEDURE — 97116 GAIT TRAINING THERAPY: CPT | Mod: GP

## 2024-09-25 PROCEDURE — 2500000001 HC RX 250 WO HCPCS SELF ADMINISTERED DRUGS (ALT 637 FOR MEDICARE OP): Performed by: INTERNAL MEDICINE

## 2024-09-25 PROCEDURE — 2500000004 HC RX 250 GENERAL PHARMACY W/ HCPCS (ALT 636 FOR OP/ED): Performed by: NURSE PRACTITIONER

## 2024-09-25 PROCEDURE — 2500000004 HC RX 250 GENERAL PHARMACY W/ HCPCS (ALT 636 FOR OP/ED): Performed by: INTERNAL MEDICINE

## 2024-09-25 PROCEDURE — 2500000002 HC RX 250 W HCPCS SELF ADMINISTERED DRUGS (ALT 637 FOR MEDICARE OP, ALT 636 FOR OP/ED): Performed by: INTERNAL MEDICINE

## 2024-09-25 PROCEDURE — 2500000004 HC RX 250 GENERAL PHARMACY W/ HCPCS (ALT 636 FOR OP/ED): Mod: JZ | Performed by: NURSE PRACTITIONER

## 2024-09-25 PROCEDURE — 87040 BLOOD CULTURE FOR BACTERIA: CPT | Mod: TRILAB | Performed by: NURSE PRACTITIONER

## 2024-09-25 PROCEDURE — 1180000001 HC REHAB PRIVATE ROOM DAILY

## 2024-09-25 PROCEDURE — 92507 TX SP LANG VOICE COMM INDIV: CPT | Mod: GN

## 2024-09-25 PROCEDURE — 97110 THERAPEUTIC EXERCISES: CPT | Mod: GP

## 2024-09-25 PROCEDURE — 2500000001 HC RX 250 WO HCPCS SELF ADMINISTERED DRUGS (ALT 637 FOR MEDICARE OP): Performed by: STUDENT IN AN ORGANIZED HEALTH CARE EDUCATION/TRAINING PROGRAM

## 2024-09-25 PROCEDURE — 36415 COLL VENOUS BLD VENIPUNCTURE: CPT | Performed by: NURSE PRACTITIONER

## 2024-09-25 PROCEDURE — 94760 N-INVAS EAR/PLS OXIMETRY 1: CPT

## 2024-09-25 PROCEDURE — 97530 THERAPEUTIC ACTIVITIES: CPT | Mod: GO

## 2024-09-25 RX ORDER — MEROPENEM AND SODIUM CHLORIDE 1 G/50ML
1 INJECTION, SOLUTION INTRAVENOUS EVERY 12 HOURS
Status: DISCONTINUED | OUTPATIENT
Start: 2024-09-25 | End: 2024-09-25 | Stop reason: HOSPADM

## 2024-09-25 RX ORDER — CLOPIDOGREL BISULFATE 75 MG/1
75 TABLET ORAL DAILY
Status: ON HOLD
Start: 2024-09-26 | End: 2025-09-24

## 2024-09-25 RX ORDER — ACETAMINOPHEN 325 MG/1
650 TABLET ORAL EVERY 4 HOURS PRN
Status: DISCONTINUED | OUTPATIENT
Start: 2024-09-25 | End: 2024-10-02 | Stop reason: HOSPADM

## 2024-09-25 RX ORDER — HYDRALAZINE HYDROCHLORIDE 25 MG/1
25 TABLET, FILM COATED ORAL EVERY 6 HOURS PRN
Status: ON HOLD
Start: 2024-09-25

## 2024-09-25 RX ORDER — ATORVASTATIN CALCIUM 40 MG/1
40 TABLET, FILM COATED ORAL DAILY
Status: DISCONTINUED | OUTPATIENT
Start: 2024-09-26 | End: 2024-10-02 | Stop reason: HOSPADM

## 2024-09-25 RX ORDER — OLANZAPINE 2.5 MG/1
2.5 TABLET ORAL NIGHTLY
Status: ON HOLD
Start: 2024-09-25

## 2024-09-25 RX ORDER — SERTRALINE HYDROCHLORIDE 100 MG/1
100 TABLET, FILM COATED ORAL
Status: DISCONTINUED | OUTPATIENT
Start: 2024-09-26 | End: 2024-10-02 | Stop reason: HOSPADM

## 2024-09-25 RX ORDER — TRAZODONE HYDROCHLORIDE 100 MG/1
100 TABLET ORAL NIGHTLY
Status: DISCONTINUED | OUTPATIENT
Start: 2024-09-25 | End: 2024-10-02 | Stop reason: HOSPADM

## 2024-09-25 RX ORDER — ASPIRIN 81 MG/1
81 TABLET ORAL DAILY
Status: DISCONTINUED | OUTPATIENT
Start: 2024-09-26 | End: 2024-10-02 | Stop reason: HOSPADM

## 2024-09-25 RX ORDER — LANOLIN ALCOHOL/MO/W.PET/CERES
1000 CREAM (GRAM) TOPICAL
Status: DISCONTINUED | OUTPATIENT
Start: 2024-09-26 | End: 2024-10-02 | Stop reason: HOSPADM

## 2024-09-25 RX ORDER — ACYCLOVIR 200 MG/1
200 CAPSULE ORAL 2 TIMES DAILY
Status: DISCONTINUED | OUTPATIENT
Start: 2024-09-25 | End: 2024-10-02 | Stop reason: HOSPADM

## 2024-09-25 RX ORDER — OLANZAPINE 5 MG/1
2.5 TABLET ORAL NIGHTLY
Status: DISCONTINUED | OUTPATIENT
Start: 2024-09-25 | End: 2024-10-02 | Stop reason: HOSPADM

## 2024-09-25 RX ORDER — ASPIRIN 81 MG/1
81 TABLET ORAL DAILY
Status: ON HOLD
Start: 2024-09-26

## 2024-09-25 RX ORDER — BISACODYL 5 MG
10 TABLET, DELAYED RELEASE (ENTERIC COATED) ORAL DAILY PRN
Status: DISCONTINUED | OUTPATIENT
Start: 2024-09-25 | End: 2024-10-02 | Stop reason: HOSPADM

## 2024-09-25 RX ORDER — BUPROPION HYDROCHLORIDE 100 MG/1
200 TABLET, EXTENDED RELEASE ORAL
Status: DISCONTINUED | OUTPATIENT
Start: 2024-09-26 | End: 2024-10-02 | Stop reason: HOSPADM

## 2024-09-25 RX ORDER — POLYETHYLENE GLYCOL 3350 17 G/17G
17 POWDER, FOR SOLUTION ORAL DAILY PRN
Status: DISCONTINUED | OUTPATIENT
Start: 2024-09-25 | End: 2024-10-02 | Stop reason: HOSPADM

## 2024-09-25 RX ORDER — CALCIUM CARBONATE 200(500)MG
260 TABLET,CHEWABLE ORAL DAILY
Status: DISCONTINUED | OUTPATIENT
Start: 2024-09-25 | End: 2024-10-02 | Stop reason: HOSPADM

## 2024-09-25 RX ORDER — ACETAMINOPHEN 160 MG/5ML
650 SOLUTION ORAL EVERY 4 HOURS PRN
Status: DISCONTINUED | OUTPATIENT
Start: 2024-09-25 | End: 2024-10-02 | Stop reason: HOSPADM

## 2024-09-25 RX ORDER — CLOPIDOGREL BISULFATE 75 MG/1
75 TABLET ORAL DAILY
Status: DISCONTINUED | OUTPATIENT
Start: 2024-09-26 | End: 2024-10-02 | Stop reason: HOSPADM

## 2024-09-25 RX ORDER — MEROPENEM 1 G/1
2 INJECTION, POWDER, FOR SOLUTION INTRAVENOUS EVERY 12 HOURS
Status: DISCONTINUED | OUTPATIENT
Start: 2024-09-25 | End: 2024-09-25

## 2024-09-25 RX ORDER — SODIUM BICARBONATE 650 MG/1
650 TABLET ORAL 2 TIMES DAILY
Status: DISCONTINUED | OUTPATIENT
Start: 2024-09-25 | End: 2024-10-02 | Stop reason: HOSPADM

## 2024-09-25 RX ORDER — HYDRALAZINE HYDROCHLORIDE 25 MG/1
25 TABLET, FILM COATED ORAL EVERY 6 HOURS PRN
Status: DISCONTINUED | OUTPATIENT
Start: 2024-09-25 | End: 2024-10-02 | Stop reason: HOSPADM

## 2024-09-25 RX ORDER — ATORVASTATIN CALCIUM 40 MG/1
40 TABLET, FILM COATED ORAL DAILY
Status: ON HOLD
Start: 2024-09-26

## 2024-09-25 RX ORDER — CHOLECALCIFEROL (VITAMIN D3) 50 MCG
2000 TABLET ORAL
Status: DISCONTINUED | OUTPATIENT
Start: 2024-09-26 | End: 2024-10-02 | Stop reason: HOSPADM

## 2024-09-25 RX ORDER — ALUMINUM HYDROXIDE, MAGNESIUM HYDROXIDE, AND SIMETHICONE 2400; 240; 2400 MG/30ML; MG/30ML; MG/30ML
30 SUSPENSION ORAL EVERY 6 HOURS PRN
Status: DISCONTINUED | OUTPATIENT
Start: 2024-09-25 | End: 2024-10-02 | Stop reason: HOSPADM

## 2024-09-25 RX ADMIN — MEROPENEM AND SODIUM CHLORIDE 1 G: 1 INJECTION, SOLUTION INTRAVENOUS at 14:26

## 2024-09-25 RX ADMIN — CYANOCOBALAMIN TAB 1000 MCG 1000 MCG: 1000 TAB at 06:44

## 2024-09-25 RX ADMIN — CLOPIDOGREL BISULFATE 75 MG: 75 TABLET ORAL at 09:24

## 2024-09-25 RX ADMIN — BUPROPION HYDROCHLORIDE 200 MG: 100 TABLET, EXTENDED RELEASE ORAL at 09:54

## 2024-09-25 RX ADMIN — ASPIRIN 81 MG: 81 TABLET, COATED ORAL at 09:24

## 2024-09-25 RX ADMIN — Medication 2000 UNITS: at 06:44

## 2024-09-25 RX ADMIN — SODIUM BICARBONATE 650 MG TABLET 650 MG: at 09:23

## 2024-09-25 RX ADMIN — ACYCLOVIR 200 MG: 200 CAPSULE ORAL at 09:24

## 2024-09-25 RX ADMIN — ATORVASTATIN CALCIUM 40 MG: 40 TABLET, FILM COATED ORAL at 09:24

## 2024-09-25 SDOH — SOCIAL STABILITY: SOCIAL INSECURITY: DOES ANYONE TRY TO KEEP YOU FROM HAVING/CONTACTING OTHER FRIENDS OR DOING THINGS OUTSIDE YOUR HOME?: NO

## 2024-09-25 SDOH — SOCIAL STABILITY: SOCIAL INSECURITY
WITHIN THE LAST YEAR, HAVE YOU BEEN RAPED OR FORCED TO HAVE ANY KIND OF SEXUAL ACTIVITY BY YOUR PARTNER OR EX-PARTNER?: NO

## 2024-09-25 SDOH — SOCIAL STABILITY: SOCIAL INSECURITY: WITHIN THE LAST YEAR, HAVE YOU BEEN HUMILIATED OR EMOTIONALLY ABUSED IN OTHER WAYS BY YOUR PARTNER OR EX-PARTNER?: NO

## 2024-09-25 SDOH — ECONOMIC STABILITY: HOUSING INSECURITY: IN THE PAST 12 MONTHS, HOW MANY TIMES HAVE YOU MOVED WHERE YOU WERE LIVING?: 0

## 2024-09-25 SDOH — ECONOMIC STABILITY: HOUSING INSECURITY: IN THE LAST 12 MONTHS, WAS THERE A TIME WHEN YOU WERE NOT ABLE TO PAY THE MORTGAGE OR RENT ON TIME?: NO

## 2024-09-25 SDOH — SOCIAL STABILITY: SOCIAL INSECURITY: WERE YOU ABLE TO COMPLETE ALL THE BEHAVIORAL HEALTH SCREENINGS?: YES

## 2024-09-25 SDOH — ECONOMIC STABILITY: INCOME INSECURITY: IN THE LAST 12 MONTHS, WAS THERE A TIME WHEN YOU WERE NOT ABLE TO PAY THE MORTGAGE OR RENT ON TIME?: NO

## 2024-09-25 SDOH — ECONOMIC STABILITY: FOOD INSECURITY: WITHIN THE PAST 12 MONTHS, THE FOOD YOU BOUGHT JUST DIDN'T LAST AND YOU DIDN'T HAVE MONEY TO GET MORE.: NEVER TRUE

## 2024-09-25 SDOH — HEALTH STABILITY: MENTAL HEALTH: EXPERIENCED ANY OF THE FOLLOWING LIFE EVENTS: DEATH OF FAMILY/FRIEND

## 2024-09-25 SDOH — SOCIAL STABILITY: SOCIAL INSECURITY: ARE THERE ANY APPARENT SIGNS OF INJURIES/BEHAVIORS THAT COULD BE RELATED TO ABUSE/NEGLECT?: NO

## 2024-09-25 SDOH — ECONOMIC STABILITY: HOUSING INSECURITY: AT ANY TIME IN THE PAST 12 MONTHS, WERE YOU HOMELESS OR LIVING IN A SHELTER (INCLUDING NOW)?: NO

## 2024-09-25 SDOH — SOCIAL STABILITY: SOCIAL INSECURITY: WITHIN THE LAST YEAR, HAVE YOU BEEN AFRAID OF YOUR PARTNER OR EX-PARTNER?: NO

## 2024-09-25 SDOH — ECONOMIC STABILITY: FOOD INSECURITY: WITHIN THE PAST 12 MONTHS, YOU WORRIED THAT YOUR FOOD WOULD RUN OUT BEFORE YOU GOT MONEY TO BUY MORE.: NEVER TRUE

## 2024-09-25 SDOH — ECONOMIC STABILITY: INCOME INSECURITY: IN THE PAST 12 MONTHS, HAS THE ELECTRIC, GAS, OIL, OR WATER COMPANY THREATENED TO SHUT OFF SERVICE IN YOUR HOME?: NO

## 2024-09-25 SDOH — SOCIAL STABILITY: SOCIAL INSECURITY
WITHIN THE LAST YEAR, HAVE TO BEEN RAPED OR FORCED TO HAVE ANY KIND OF SEXUAL ACTIVITY BY YOUR PARTNER OR EX-PARTNER?: NO

## 2024-09-25 SDOH — SOCIAL STABILITY: SOCIAL INSECURITY: HAVE YOU HAD ANY THOUGHTS OF HARMING ANYONE ELSE?: NO

## 2024-09-25 SDOH — SOCIAL STABILITY: SOCIAL INSECURITY: DO YOU FEEL UNSAFE GOING BACK TO THE PLACE WHERE YOU ARE LIVING?: NO

## 2024-09-25 SDOH — SOCIAL STABILITY: SOCIAL INSECURITY: HAVE YOU HAD THOUGHTS OF HARMING ANYONE ELSE?: NO

## 2024-09-25 SDOH — SOCIAL STABILITY: SOCIAL INSECURITY
WITHIN THE LAST YEAR, HAVE YOU BEEN KICKED, HIT, SLAPPED, OR OTHERWISE PHYSICALLY HURT BY YOUR PARTNER OR EX-PARTNER?: NO

## 2024-09-25 SDOH — SOCIAL STABILITY: SOCIAL INSECURITY: ABUSE: ADULT

## 2024-09-25 SDOH — SOCIAL STABILITY: SOCIAL INSECURITY: ARE YOU OR HAVE YOU BEEN THREATENED OR ABUSED PHYSICALLY, EMOTIONALLY, OR SEXUALLY BY ANYONE?: NO

## 2024-09-25 SDOH — ECONOMIC STABILITY: TRANSPORTATION INSECURITY
IN THE PAST 12 MONTHS, HAS THE LACK OF TRANSPORTATION KEPT YOU FROM MEDICAL APPOINTMENTS OR FROM GETTING MEDICATIONS?: NO

## 2024-09-25 SDOH — ECONOMIC STABILITY: FOOD INSECURITY: WITHIN THE PAST 12 MONTHS, YOU WORRIED THAT YOUR FOOD WOULD RUN OUT BEFORE YOU GOT THE MONEY TO BUY MORE.: NEVER TRUE

## 2024-09-25 SDOH — ECONOMIC STABILITY: INCOME INSECURITY: IN THE PAST 12 MONTHS HAS THE ELECTRIC, GAS, OIL, OR WATER COMPANY THREATENED TO SHUT OFF SERVICES IN YOUR HOME?: NO

## 2024-09-25 SDOH — SOCIAL STABILITY: SOCIAL INSECURITY: DO YOU FEEL ANYONE HAS EXPLOITED OR TAKEN ADVANTAGE OF YOU FINANCIALLY OR OF YOUR PERSONAL PROPERTY?: NO

## 2024-09-25 SDOH — SOCIAL STABILITY: SOCIAL INSECURITY: HAS ANYONE EVER THREATENED TO HURT YOUR FAMILY OR YOUR PETS?: NO

## 2024-09-25 SDOH — ECONOMIC STABILITY: TRANSPORTATION INSECURITY
IN THE PAST 12 MONTHS, HAS LACK OF TRANSPORTATION KEPT YOU FROM MEETINGS, WORK, OR FROM GETTING THINGS NEEDED FOR DAILY LIVING?: NO

## 2024-09-25 SDOH — ECONOMIC STABILITY: TRANSPORTATION INSECURITY: IN THE PAST 12 MONTHS, HAS LACK OF TRANSPORTATION KEPT YOU FROM MEDICAL APPOINTMENTS OR FROM GETTING MEDICATIONS?: NO

## 2024-09-25 SDOH — ECONOMIC STABILITY: INCOME INSECURITY: HOW HARD IS IT FOR YOU TO PAY FOR THE VERY BASICS LIKE FOOD, HOUSING, MEDICAL CARE, AND HEATING?: NOT HARD AT ALL

## 2024-09-25 SDOH — ECONOMIC STABILITY: FOOD INSECURITY: HOW HARD IS IT FOR YOU TO PAY FOR THE VERY BASICS LIKE FOOD, HOUSING, MEDICAL CARE, AND HEATING?: NOT HARD AT ALL

## 2024-09-25 SDOH — SOCIAL STABILITY: SOCIAL INSECURITY: HAVE YOU HAD THOUGHTS OF HARMING ANYONE ELSE?: YES

## 2024-09-25 ASSESSMENT — PAIN - FUNCTIONAL ASSESSMENT
PAIN_FUNCTIONAL_ASSESSMENT: 0-10

## 2024-09-25 ASSESSMENT — COGNITIVE AND FUNCTIONAL STATUS - GENERAL
DAILY ACTIVITIY SCORE: 17
DRESSING REGULAR LOWER BODY CLOTHING: A LOT
CLIMB 3 TO 5 STEPS WITH RAILING: A LITTLE
TOILETING: A LITTLE
MOVING TO AND FROM BED TO CHAIR: A LITTLE
EATING MEALS: A LITTLE
MOBILITY SCORE: 20
WALKING IN HOSPITAL ROOM: A LITTLE
PERSONAL GROOMING: A LITTLE
STANDING UP FROM CHAIR USING ARMS: A LITTLE
DRESSING REGULAR UPPER BODY CLOTHING: A LITTLE
HELP NEEDED FOR BATHING: A LITTLE

## 2024-09-25 ASSESSMENT — ACTIVITIES OF DAILY LIVING (ADL)
LACK_OF_TRANSPORTATION: NO
DRESSING: INDEPENDENT
LACK_OF_TRANSPORTATION: NO
TOILETING: INDEPENDENT
BATHING_COMMENTS: COMPLETED EARLIER
ASSISTIVE_DEVICE: WALKER;WHEELCHAIR
LACK_OF_TRANSPORTATION: NO
BATHING: INDEPENDENT
BLADDER: INCONTINENT
LACK_OF_TRANSPORTATION: NO
ASSISTIVE_DEVICE: EYEGLASSES;WHEELCHAIR;WALKER
BOWEL: CONTINENT
LACK_OF_TRANSPORTATION: NO
LACK_OF_TRANSPORTATION: NO

## 2024-09-25 ASSESSMENT — LIFESTYLE VARIABLES
HOW OFTEN DO YOU HAVE 6 OR MORE DRINKS ON ONE OCCASION: NEVER
HOW OFTEN DO YOU HAVE A DRINK CONTAINING ALCOHOL: NEVER
AUDIT-C TOTAL SCORE: 0
AUDIT-C TOTAL SCORE: 0
SUBSTANCE_ABUSE_PAST_12_MONTHS: NO
HOW MANY STANDARD DRINKS CONTAINING ALCOHOL DO YOU HAVE ON A TYPICAL DAY: PATIENT DOES NOT DRINK
HOW MANY STANDARD DRINKS CONTAINING ALCOHOL DO YOU HAVE ON A TYPICAL DAY: PATIENT DOES NOT DRINK
SKIP TO QUESTIONS 9-10: 1
SKIP TO QUESTIONS 9-10: 1
AUDIT-C TOTAL SCORE: 0
PRESCIPTION_ABUSE_PAST_12_MONTHS: NO
HOW OFTEN DO YOU HAVE A DRINK CONTAINING ALCOHOL: NEVER
HOW OFTEN DO YOU HAVE 6 OR MORE DRINKS ON ONE OCCASION: NEVER
AUDIT-C TOTAL SCORE: 0

## 2024-09-25 ASSESSMENT — COLUMBIA-SUICIDE SEVERITY RATING SCALE - C-SSRS
2. HAVE YOU ACTUALLY HAD ANY THOUGHTS OF KILLING YOURSELF?: NO
6. HAVE YOU EVER DONE ANYTHING, STARTED TO DO ANYTHING, OR PREPARED TO DO ANYTHING TO END YOUR LIFE?: NO
6. HAVE YOU EVER DONE ANYTHING, STARTED TO DO ANYTHING, OR PREPARED TO DO ANYTHING TO END YOUR LIFE?: NO
1. IN THE PAST MONTH, HAVE YOU WISHED YOU WERE DEAD OR WISHED YOU COULD GO TO SLEEP AND NOT WAKE UP?: NO
2. HAVE YOU ACTUALLY HAD ANY THOUGHTS OF KILLING YOURSELF?: NO
1. IN THE PAST MONTH, HAVE YOU WISHED YOU WERE DEAD OR WISHED YOU COULD GO TO SLEEP AND NOT WAKE UP?: NO

## 2024-09-25 ASSESSMENT — PAIN SCALES - GENERAL
PAINLEVEL_OUTOF10: 0 - NO PAIN

## 2024-09-25 ASSESSMENT — PAIN SCALES - WONG BAKER: WONGBAKER_NUMERICALRESPONSE: NO HURT

## 2024-09-25 ASSESSMENT — BRIEF INTERVIEW FOR MENTAL STATUS (BIMS)
INITIAL REPETITION OF BED BLUE SOCK - FIRST ATTEMPT: 3
COGNITIVE PATTERN ASSESSMENT USED: BIMS
ASKED TO RECALL SOCK: YES, NO CUE REQUIRED
WHAT YEAR IS IT: CORRECT
ASKED TO RECALL BED: YES, NO CUE REQUIRED
GENERAL FUNCTIONAL COGNITION RATING: INDEPENDENT
WHAT MONTH IS IT: ACCURATE WITHIN 5 DAYS
ASKED TO RECALL BLUE: YES, NO CUE REQUIRED
BIMS SUMMARY SCORE: 15
WHAT DAY OF THE WEEK IS IT: CORRECT

## 2024-09-25 ASSESSMENT — PATIENT HEALTH QUESTIONNAIRE - PHQ9
2. FEELING DOWN, DEPRESSED OR HOPELESS: NOT AT ALL
1. LITTLE INTEREST OR PLEASURE IN DOING THINGS: NOT AT ALL
1. LITTLE INTEREST OR PLEASURE IN DOING THINGS: NOT AT ALL
SUM OF ALL RESPONSES TO PHQ9 QUESTIONS 1 & 2: 0
2. FEELING DOWN, DEPRESSED OR HOPELESS: NOT AT ALL
SUM OF ALL RESPONSES TO PHQ9 QUESTIONS 1 & 2: 0
SUM OF ALL RESPONSES TO PHQ9 QUESTIONS 1 & 2: 0
2. FEELING DOWN, DEPRESSED OR HOPELESS: NOT AT ALL
1. LITTLE INTEREST OR PLEASURE IN DOING THINGS: NOT AT ALL

## 2024-09-25 ASSESSMENT — ENCOUNTER SYMPTOMS
MUSCULOSKELETAL NEGATIVE: 1
ENDOCRINE NEGATIVE: 1
EYES NEGATIVE: 1
PSYCHIATRIC NEGATIVE: 1
CARDIOVASCULAR NEGATIVE: 1
NEUROLOGICAL NEGATIVE: 1
CONSTITUTIONAL NEGATIVE: 1
GASTROINTESTINAL NEGATIVE: 1
RESPIRATORY NEGATIVE: 1

## 2024-09-25 NOTE — DISCHARGE SUMMARY
Discharge Diagnosis  Acute/subacute lacunar infarct  ESBL UTI    Issues Requiring Follow-Up  Follow-up with neurology, stroke  Follow-up with cardiology, cardiac monitor  Follow-up with PCP    Discharge Meds     Medication List      START taking these medications     aspirin 81 mg EC tablet; Take 1 tablet (81 mg) by mouth once daily.;   Start taking on: September 26, 2024   atorvastatin 40 mg tablet; Commonly known as: Lipitor; Take 1 tablet (40   mg) by mouth once daily.; Start taking on: September 26, 2024   clopidogrel 75 mg tablet; Commonly known as: Plavix; Take 1 tablet (75   mg) by mouth once daily for 363 doses.; Start taking on: September 26, 2024   hydrALAZINE 25 mg tablet; Commonly known as: Apresoline; Take 1 tablet   (25 mg) by mouth every 6 hours if needed (systolic blood pressure greater   than 220 mm Hg).   meropenem 1 g in sodium chloride 0.9% 250 mL IVPB; Infuse 1 g at 540   mL/hr over 0.5 hours into a venous catheter every 12 hours for 6 days.     CHANGE how you take these medications     * OLANZapine 5 mg tablet; Commonly known as: ZyPREXA; What changed:   Another medication with the same name was added. Make sure you understand   how and when to take each.   * OLANZapine 2.5 mg tablet; Commonly known as: ZyPREXA; Take 1 tablet   (2.5 mg) by mouth once daily at bedtime.; What changed: You were already   taking a medication with the same name, and this prescription was added.   Make sure you understand how and when to take each.  * This list has 2 medication(s) that are the same as other medications   prescribed for you. Read the directions carefully, and ask your doctor or   other care provider to review them with you.     CONTINUE taking these medications     acyclovir 200 mg capsule; Commonly known as: Zovirax   buPROPion  mg 12 hr tablet; Commonly known as: Wellbutrin SR   calcium carbonate 260 mg calcium (650 mg) tablet   cholecalciferol 25 MCG (1000 UT) capsule; Commonly known as:  Vitamin D-3   cyanocobalamin 1,000 mcg tablet; Commonly known as: Vitamin B-12   loperamide 1 mg/7.5 mL liquid; Commonly known as: Imodium A-D   sertraline 100 mg tablet; Commonly known as: Zoloft   sodium bicarbonate 650 mg tablet   traZODone 100 mg tablet; Commonly known as: Desyrel       Test Results Pending At Discharge  Pending Labs       Order Current Status    Extra Urine Gray Tube Collected (09/21/24 1143)    Blood Culture In process    Blood Culture In process    Urinalysis with Reflex Culture and Microscopic In process            Hospital Course     Patient is a 64-year-old female with a past medical history of multiple myeloma on chemo, CKD stage IV, amyloidosis who presented to the emergency room for difficulty standing and reported right-sided weakness.  There was a question of whether or not patient had a history of A-fib.  The patient denies a history and no reports are found in the medical record.  She will be discharged with a Zio patch and follow-up with cardiology, Dr. Hammond.  Patient was seen for cardiology and worked up for an acute CVA.  An MRI revealed an acute/subacute lacunar infarct in the right frontal lobe.  Incidentally patient also had an UTI with a urine culture that grew ESBL.  She will need to be on Merrem until 10/1/2024.  Pertinent Physical Exam At Time of Discharge  Physical Exam  Constitutional:       Appearance: Normal appearance.   Cardiovascular:      Rate and Rhythm: Normal rate and regular rhythm.      Pulses: Normal pulses.      Heart sounds: Normal heart sounds.   Pulmonary:      Effort: Pulmonary effort is normal.      Breath sounds: Normal breath sounds.   Abdominal:      General: Bowel sounds are normal.      Palpations: Abdomen is soft.   Musculoskeletal:         General: Normal range of motion.   Skin:     General: Skin is warm and dry.   Neurological:      Mental Status: She is alert and oriented to person, place, and time.      Motor: Weakness present.      Gait:  Gait abnormal.         Outpatient Follow-Up  No future appointments.      Monika Oropeza, APRN-CNP

## 2024-09-25 NOTE — CONSULTS
Inpatient consult to Infectious Diseases  Consult performed by: LINDA Oviedo  Consult ordered by: LINDA Oconnell  Reason for consult: Resistant UTI          Primary MD: GENERIC EXTERNAL DATA PROVIDER        History Of Present Illness  Lisbeth Cruz is a 64 y.o. female with medical history of multiple myeloma on chemotherapy, chronic kidney disease stage IV, amyloidosis, has right chest port.  She Presented to the emergency room with right sided weakness, slurred speech.  She was admitted for further evaluation and management.  She underwent stroke evaluation and Was found to have punctate right frontal infarct.  Workup also revealed urinary tract infection-culture grew ESBL E. coli.  She is sitting up in the chair.  She reports no shortness of breath cough or chest pain.  She denies nausea vomiting or diarrhea.  She denies dysuria.  Labs with leukopenia, thrombocytopenia.  Chronic Kidney disease.  Chest x-ray with mild pulmonary vascular congestion.  She is on IV meropenem.    Past Medical History  She has a past medical history of Anxiety (2024), History of gastric bypass (2022), Insomnia (2024), Major depressive disorder (2024), Multiple myeloma not having achieved remission (Multi) (2024), Other amyloidosis (Multi) (2024), Primary hypertension (2024), Renal mass, and Stage 4 chronic kidney disease (Multi) (2024).    Surgical History  She has a past surgical history that includes IR CVC PICC (07/10/2023); Hysterectomy; Cholecystectomy; and Hip surgery (Right).     Social History     Occupational History    Not on file   Tobacco Use    Smoking status: Former     Current packs/day: 0.00     Types: Cigarettes     Quit date:      Years since quittin.7    Smokeless tobacco: Never   Substance and Sexual Activity    Alcohol use: Not on file    Drug use: Not on file    Sexual activity: Not on file     Travel History   Travel since  24    No documented travel since 24              Family History  Family History   Problem Relation Name Age of Onset    Other (old age) Mother  92    Other (HF) Father  85     Allergies  Compazine [prochlorperazine]       There is no immunization history on file for this patient.  Medications  Home medications:  Medications Prior to Admission   Medication Sig Dispense Refill Last Dose    acyclovir (Zovirax) 200 mg capsule Take 1 capsule (200 mg) by mouth twice a day.       buPROPion SR (Wellbutrin SR) 200 mg 12 hr tablet Take 1 tablet (200 mg) by mouth once daily.       calcium carbonate 260 mg calcium (650 mg) tablet Chew 1 tablet (260 mg) once daily.       cholecalciferol (Vitamin D-3) 25 MCG (1000 UT) capsule Take 2 capsules (50 mcg) by mouth once daily.       cyanocobalamin (Vitamin B-12) 1,000 mcg tablet Take 1 tablet (1,000 mcg) by mouth once daily.       OLANZapine (ZyPREXA) 5 mg tablet Take 0.5 tablets (2.5 mg) by mouth.       sertraline (Zoloft) 100 mg tablet Take 1 tablet (100 mg) by mouth once daily.       sodium bicarbonate 650 mg tablet Take 1 tablet (650 mg) by mouth 2 times a day.       traZODone (Desyrel) 100 mg tablet Take 1 tablet (100 mg) by mouth once daily at bedtime.       loperamide (Imodium A-D) 1 mg/7.5 mL liquid Take 1 tablet by mouth once daily.        Current medications:  Scheduled medications  acyclovir, 200 mg, oral, BID  aspirin, 81 mg, oral, Daily  atorvastatin, 40 mg, oral, Daily  buPROPion SR, 200 mg, oral, Daily  cholecalciferol, 2,000 Units, oral, Daily  clopidogrel, 75 mg, oral, Daily  cyanocobalamin, 1,000 mcg, oral, Daily  meropenem, 2 g, intravenous, q12h  OLANZapine, 2.5 mg, oral, Nightly  sertraline, 100 mg, oral, Daily  sodium bicarbonate, 650 mg, oral, BID  traZODone, 100 mg, oral, Nightly      Continuous medications     PRN medications  PRN medications: [] hydrALAZINE **FOLLOWED BY** hydrALAZINE, LORazepam, oxygen    Review of Systems   Constitutional:  Negative.    HENT: Negative.     Eyes: Negative.    Respiratory: Negative.     Cardiovascular: Negative.    Gastrointestinal: Negative.    Endocrine: Negative.    Genitourinary: Negative.    Musculoskeletal: Negative.    Skin: Negative.    Neurological: Negative.    Psychiatric/Behavioral: Negative.          Objective  Range of Vitals (last 24 hours)  Heart Rate:  [66-74]   Temp:  [36.2 °C (97.2 °F)-37.2 °C (99 °F)]   Resp:  [15-16]   BP: (130-149)/(79-91)   Weight:  [59.4 kg (131 lb)]   SpO2:  [87 %-100 %]   Daily Weight  09/25/24 : 59.4 kg (131 lb)    Body mass index is 24.75 kg/m².     Physical Exam  Constitutional:       Appearance: Normal appearance.   HENT:      Head: Normocephalic and atraumatic.      Nose: Nose normal.      Mouth/Throat:      Mouth: Mucous membranes are moist.      Pharynx: Oropharynx is clear.   Eyes:      General: No scleral icterus.  Cardiovascular:      Rate and Rhythm: Normal rate and regular rhythm.      Comments: Right chest port   Pulmonary:      Effort: Pulmonary effort is normal.      Breath sounds: Normal breath sounds.   Abdominal:      General: Bowel sounds are normal.      Palpations: Abdomen is soft.   Musculoskeletal:         General: Normal range of motion.      Cervical back: Normal range of motion and neck supple.   Skin:     General: Skin is warm and dry.   Neurological:      General: No focal deficit present.      Mental Status: She is alert and oriented to person, place, and time. Mental status is at baseline.   Psychiatric:         Mood and Affect: Mood normal.         Behavior: Behavior normal.          Relevant Results  Outside Hospital Results  No  Labs  Results from last 72 hours   Lab Units 09/24/24  0544   WBC AUTO x10*3/uL 4.2*   HEMOGLOBIN g/dL 9.5*   HEMATOCRIT % 29.5*   PLATELETS AUTO x10*3/uL 109*     Results from last 72 hours   Lab Units 09/24/24  0544   SODIUM mmol/L 137   POTASSIUM mmol/L 3.7   CHLORIDE mmol/L 111*   CO2 mmol/L 19*   BUN mg/dL 13  "  CREATININE mg/dL 1.58*   GLUCOSE mg/dL 76   CALCIUM mg/dL 8.3*   ANION GAP mmol/L 11   EGFR mL/min/1.73m*2 36*         Estimated Creatinine Clearance: 29 mL/min (A) (by C-G formula based on SCr of 1.58 mg/dL (H)).  No results found for: \"CRP\", \"SEDRATE\"  No results found for: \"HIV1X2\", \"HIVCONF\", \"WRIAMC9GI\"  No results found for: \"HEPCABINIT\", \"HEPCAB\", \"HCVPCRQUANT\"  Microbiology  Susceptibility data from last 90 days.  Collected Specimen Info Organism Amoxicillin/Clavulanate Ampicillin Ampicillin/Sulbactam Aztreonam Cefazolin Cefazolin (uncomplicated UTIs only) Cefepime Cefotaxime Ceftazidime Ceftriaxone Ciprofloxacin Ertapenem   09/21/24 Urine from Clean Catch/Voided Escherichia coli  I  R  I  R  R  R  R  R  R  R  R  S     Collected Specimen Info Organism Gentamicin Meropenem Nitrofurantoin Piperacillin/Tazobactam Trimethoprim/Sulfamethoxazole   09/21/24 Urine from Clean Catch/Voided Escherichia coli  S  S  S  S  R         Imaging  MR cervical spine wo IV contrast    Result Date: 9/24/2024  Interpreted By:  Justin Prasad, STUDY: MR CERVICAL SPINE WO IV CONTRAST;  9/23/2024 9:55 pm   INDICATION: Signs/Symptoms:R sided weakness not explained by stroke on MRI.   COMPARISON: CTA head and neck dated 09/21/2024. MRI brain dated 09/22/2024.   ACCESSION NUMBER(S): [id number]   ORDERING CLINICIAN: YAJAIRA KENNEY   TECHNIQUE: Multiplanar multisequence noncontrast MR imaging was performed through the cervical spine. Noncontrast sagittal T1, T2, STIR, axial T1 and axial T2 weighted images were acquired through the cervical spine.   FINDINGS: Cord: Normal in caliber and signal.   Epidural fluid: None.   Alignment: 1-2 mm anterolisthesis of C3 on C4. Trace retrolisthesis of C4 on C5. Alignment is otherwise maintained.   Vertebral bodies: Cervical vertebral body heights are grossly preserved.   Marrow signal: Mild type 1 Modic endplate signal change at C4-C5. No suspicious STIR hyperintensity/marrow edema within the cervical " spine.   Intervertebral Discs: Moderate degenerative disc height loss at C4-C5 and C5-C6. Mild-to-moderate degenerative disc height loss at C3-C4. Multilevel disc desiccation.     Degenerative change:   C1-C2:  Mild arthrosis centered about the dens. No spinal canal stenosis.   C2-C3:  Moderate left facet arthropathy. No spinal canal stenosis. No right and mild left neural foraminal narrowing.   C3-C4:  Moderate left facet arthropathy. Mild disc uncovering. Right-greater-than-left uncovertebral spurring. Ventral thecal sac indentation without spinal canal stenosis. Moderate left and mild right neural foraminal narrowing suggested.   C4-C5:  Mild left facet arthropathy. Mild ligamentum flavum thickening/infolding. Bilateral uncovertebral spurring. Mild disc bulge and hypertrophic endplate spurring. Mild-to-moderate spinal canal stenosis. Severe right and moderate left neural foraminal narrowing suggested.   C5-C6:  Mild left facet arthropathy. Mild ligamentum flavum thickening/infolding. Right-greater-than-left uncovertebral spurring. Mild-to-moderate disc bulge. Mild spinal canal stenosis. Severe right and no substantial left neural foraminal narrowing.   C6-C7:  Unremarkable.   C7-T1:  Mild bilateral facet arthropathy. No spinal canal stenosis or neural foraminal narrowing.   Soft tissues: The prevertebral and posterior paraspinous soft tissues are within normal limits.       Cervical spondylosis, worst at C4-C5 and C5-C6. There is mild-to-moderate spinal canal stenosis at C4-C5 with severe right/moderate left neural foraminal narrowing. There is mild spinal canal stenosis at C5-C6 with severe right neural foraminal narrowing.   MACRO: None   Signed by: Justin Prasad 9/24/2024 1:56 PM Dictation workstation:   [id number]    ECG 12 lead    Result Date: 9/23/2024  Normal sinus rhythm Normal ECG No previous ECGs available Confirmed by Mario Alberto Hammond ([id number]) on 9/23/2024 12:11:39 PM    Transthoracic Echo (TTE)  Complete    Result Date: 9/23/2024            [facility] [address], [place] [address]             Phone [phone] TRANSTHORACIC ECHOCARDIOGRAM REPORT Patient Name:     BISHNU SANTOS      Reading Physician:   [id number] Enzo Owenselaine LORA Study Date:       9/23/2024            Ordering Provider:   [id number] SANDRA VERNON MRN/PID:          [id number]             Fellow: Accession#:       [id number]         Nurse: Date of           1960 / 64 years Sonographer:         Monika Ramos RDCS Birth/Age: Gender:           F                    Additional Staff: Height:           154.94 cm            Admit Date: Weight:           68.95 kg             Admission Status:    Inpatient - Routine BSA / BMI:        1.68 m2 / 28.72      Department Location: [facility]                   kg/m2 Blood Pressure: 124 /50 mmHg Study Type:    TRANSTHORACIC ECHO (TTE) COMPLETE Diagnosis/ICD: Other amyloidosis-E85.8 CPT Codes:     Echo Complete w Full Doppler-[id number] Patient History: Pertinent History: HTN, cardiomyopathy, CKD, CHF. Study Detail: The following Echo studies were performed: 2D, M-Mode, Doppler and               color flow. Technically challenging study due to body habitus.               Agitated saline used as a contrast agent for intraseptal flow               evaluation.  PHYSICIAN INTERPRETATION: Left Ventricle: Left ventricular ejection fraction is normal, by visual estimate at 60-65%. There are no regional wall motion abnormalities. The left ventricular cavity size is normal. Spectral Doppler shows an impaired relaxation pattern of left ventricular diastolic filling. Left Atrium: The left atrium is mildly dilated. A bubble study using agitated saline was performed. Right Ventricle: The right ventricle is normal in size. There is normal right ventricular global systolic function. Right Atrium: The right atrium is normal in size. Aortic  Valve: The aortic valve appears structurally normal. The aortic valve dimensionless index is 0.80. There is no evidence of aortic valve regurgitation. The peak instantaneous gradient of the aortic valve is 11.2 mmHg. The mean gradient of the aortic valve is 5.0 mmHg. Mitral Valve: The mitral valve is normal in structure. There is no evidence of mitral valve regurgitation. Tricuspid Valve: The tricuspid valve is structurally normal. There is trace to mild tricuspid regurgitation. The Doppler estimated RVSP is within normal limits at 28.4 mmHg. Pulmonic Valve: The pulmonic valve is structurally normal. There is no indication of pulmonic valve regurgitation. Pericardium: No pericardial effusion noted. Aorta: The aortic root is normal.  CONCLUSIONS:  1. Left ventricular ejection fraction is normal, by visual estimate at 60-65%.  2. Spectral Doppler shows an impaired relaxation pattern of left ventricular diastolic filling.  3. There is normal right ventricular global systolic function.  4. Right ventricular within normal limits. QUANTITATIVE DATA SUMMARY:  2D MEASUREMENTS:           Normal Ranges: LAs:             4.20 cm   (2.7-4.0cm) IVSd:            0.65 cm   (0.6-1.1cm) LVPWd:           1.15 cm   (0.6-1.1cm) LVIDd:           4.24 cm   (3.9-5.9cm) LVIDs:           2.25 cm LV Mass Index:   71.8 g/m2 LV % FS          46.9 %  LA VOLUME:                    Normal Ranges: LA Vol A4C:        64.9 ml    (22+/-6mL/m2) LA Vol A2C:        66.9 ml LA Vol BP:         68.9 ml LA Vol Index A4C:  38.6ml/m2 LA Vol Index A2C:  39.8 ml/m2 LA Vol Index BP:   41.0 ml/m2 LA Area A4C:       21.0 cm2 LA Area A2C:       22.3 cm2 LA Major Axis A4C: 5.8 cm LA Major Axis A2C: 6.3 cm LA Volume Index:   38.8 ml/m2 LA Vol A4C:        61.4 ml LA Vol A2C:        63.2 ml LA Vol Index BSA:  37.1 ml/m2  RA VOLUME BY A/L METHOD:            Normal Ranges: RA Vol A4C:              18.8 ml    (8.3-19.5ml) RA Vol Index A4C:        11.2 ml/m2 RA Area A4C:              10.2 cm2 RA Major Axis A4C:       4.7 cm  M-MODE MEASUREMENTS:         Normal Ranges: Ao Root:             2.80 cm (2.0-3.7cm) LAs:                 4.60 cm (2.7-4.0cm)  AORTA MEASUREMENTS:         Normal Ranges: Ao Sinus, d:        2.88 cm (2.1-3.5cm) Ao STJ, d:          2.76 cm (1.7-3.4cm) Asc Ao, d:          2.80 cm (2.1-3.4cm)  LV SYSTOLIC FUNCTION BY 2D PLANIMETRY (MOD):                      Normal Ranges: EF-A4C View:    61 % (>=55%) EF-A2C View:    49 % EF-Biplane:     55 % EF-Visual:      63 % LV EF Reported: 63 %  LV DIASTOLIC FUNCTION:           Normal Ranges: MV Peak E:             0.53 m/s  (0.7-1.2 m/s) MV Peak A:             0.87 m/s  (0.42-0.7 m/s) E/A Ratio:             0.61      (1.0-2.2) MV e'                  0.055 m/s (>8.0) MV lateral e'          0.06 m/s MV medial e'           0.05 m/s E/e' Ratio:            9.58      (<8.0)  MITRAL VALVE:          Normal Ranges: MV DT:        225 msec (150-240msec)  AORTIC VALVE:                      Normal Ranges: AoV Vmax:                1.67 m/s  (<=1.7m/s) AoV Peak P.2 mmHg (<20mmHg) AoV Mean P.0 mmHg  (1.7-11.5mmHg) LVOT Max Kevin:            1.31 m/s  (<=1.1m/s) AoV VTI:                 31.10 cm  (18-25cm) LVOT VTI:                24.80 cm LVOT Diameter:           2.00 cm   (1.8-2.4cm) AoV Area, VTI:           2.51 cm2  (2.5-5.5cm2) AoV Area,Vmax:           2.46 cm2  (2.5-4.5cm2) AoV Dimensionless Index: 0.80  RIGHT VENTRICLE: RV Basal 2.73 cm RV Mid   2.43 cm RV Major 6.2 cm TAPSE:   23.6 mm RV s'    0.07 m/s  TRICUSPID VALVE/RVSP:          Normal Ranges: Peak TR Velocity:     2.52 m/s RV Syst Pressure:     28 mmHg  (< 30mmHg) IVC Diam:             1.85 cm  [id number] Enzo Mirna LORA Electronically signed on 2024 at 11:52:08 AM  ** Final **     MR brain wo IV contrast    Result Date: 2024  Interpreted By:  Murphy Tirado, STUDY: MR BRAIN WO IV CONTRAST;  2024 3:00 pm   INDICATION:  Signs/Symptoms:Right-sided weakness  and dysarthria.   COMPARISON: None.   ACCESSION NUMBER(S): [id number]   ORDERING CLINICIAN: SANDRA VERNON   TECHNIQUE: The brain was studied in the sagittal, axial and coronal planes utilizing FLAIR, T1 and T2 weighted images.   FINDINGS: There is a normal-size ventricular system.  There are scattered foci of increased signal in the subcortical and periventricular white matter best appreciated on FLAIR images. These likely represent foci of demyelination of uncertain cause and significance and may be physiologic at this patient's stated age.   There is no evidence of intracranial mass or extra-axial collection. There are 2.5/3 cm retention cysts or polyps in the maxillary sinuses bilaterally. The skull base, paranasal sinuses and orbital structures are otherwise unremarkable.   Diffusion weighted images and associated ADC maps of the brain demonstrate a punctate focus of diffusion restriction in the right frontal lobe white matter on axial diffusion-weighted image 26/40. Finding is consistent with acute/subacute lacunar infarction.. Gradient echo T2 weighted images fail to demonstrate hemosiderin deposition or other evidence of hemorrhage.       *Acute/subacute lacunar infarction in the right frontal lobe white matter measuring a proximally 3 mm in size. No hemorrhage or measurable mass effect *No evidence of intracranial mass, extra-axial collection or cerebral hemorrhage.   MACRO: none   Signed by: Murphy Tirado 9/22/2024 3:46 PM Dictation workstation:   [id number]    CT head wo IV contrast    Result Date: 9/21/2024  Interpreted By:  Blossom Griffith, STUDY: CT HEAD WO IV CONTRAST;  9/21/2024 12:31 pm   INDICATION: Signs/Symptoms:worsening sx on code brain attack.     COMPARISON: CT brain attack performed on the same day at 10:33 a.m..   ACCESSION NUMBER(S): [id number]   ORDERING CLINICIAN: DORENE PALACIO   TECHNIQUE: Noncontrast axial CT scan of head was performed. Angled  reformats in brain and bone windows were generated. The images were reviewed in bone, brain, blood and soft tissue windows.   FINDINGS: Parenchyma and CSF spaces: There is mild-to-moderate diffuse cerebral volume loss with widening of the sulci and ex vacuo dilatation of ventricles. There is diffuse periventricular white matter hypoattenuation in bilateral cerebral hemispheres, likely favored to be related to chronic white matter ischemic changes. There is interval increase in density within the falx cerebri and increased density in the cerebral vasculature, likely favored to be related to recently received intravenous contrast for immediate prior CT angiogram. Evaluation for subtle intracranial hemorrhage is limited secondary to background enhancement. Within this limitation, there is no evidence of intracranial hemorrhage or extra-axial fluid collection. No large territorial loss of gray-white differentiation is noted. Basal cisterns are patent. No evidence of mass effect or midline shift.   Calvarium: The calvarium is unremarkable.   Paranasal sinuses and mastoids: There is partial opacification/mucosal thickening in the included bilateral maxillary sinuses. Otherwise rest of the paranasal sinuses and mastoid air cells are clear.       1. Interval evidence of hyperdensity in the falx cerebri and in the intracranial vasculature as described above is most likely favored to be related to recent intravenous contrast administered during the CT angiogram head performed on the same day. Within this limitation, there is no evidence of intracranial hemorrhage or mass effect or midline shift. 2. No definite CT evidence of territorial loss of gray-white differentiation to suggest acute infarction. However an MRI can be considered for definitive evaluation.     MACRO: None     Signed by: Blossom Griffith 9/21/2024 12:50 PM Dictation workstation:   [id number]    XR chest 1 view    Result Date: 9/21/2024  Interpreted By:   Eric Tiwari, STUDY: XR CHEST 1 VIEW;  9/21/2024 11:23 am   INDICATION: Signs/Symptoms:brain attack.     COMPARISON: None.   ACCESSION NUMBER(S): [id number]   ORDERING CLINICIAN: DORENE PALACIO   FINDINGS: Right port catheter in place.   CARDIOMEDIASTINAL SILHOUETTE: Cardiomediastinal silhouette is normal in size and configuration. There is pulmonary vascular congestion.   LUNGS: There is no consolidation. There is no effusion.   ABDOMEN: No remarkable upper abdominal findings.   BONES: No acute osseous changes.       1. Mild pulmonary vascular congestion without acute abnormality       MACRO: None   Signed by: Eric Tiwari 9/21/2024 11:36 AM Dictation workstation:   [id number]    CT angio head and neck w and wo IV contrast    Result Date: 9/21/2024  Interpreted By:  Murphy Tirado, STUDY: CT ANGIO HEAD AND NECK W AND WO IV CONTRAST;  9/21/2024 10:54 am   INDICATION: Signs/Symptoms:Brain attack, right sided weakness.   COMPARISON: None.   ACCESSION NUMBER(S): [id number]   ORDERING CLINICIAN: DORENE PALACIO   TECHNIQUE: Following intravenous injection of contrast material, CT was acquired from the aortic arch to the vertex of the skull. Multiplanar reconstructions and 3D reconstructions were made.3D reconstructions, MIPs, Volume Rendered, or Surface Shading image were performed at a separate workstation   FINDINGS: Chest   The aortic arch and origin of great vessels are normal. The visualized mediastinum and pulmonary apices are normal.   Neck   Right carotid The common carotid artery is normal. The bifurcation is normal. The cervical, petrous and cavernous portions are normal   Left carotid The common carotid artery is normal. The bifurcation is normal. The cervical, petrous and cavernous portions are normal.   Vertebrals   The vertebral arteries are symmetrical. Their origin is are normal. No evidence of compression or filling defect in the cervical portions. The transverse intradural portions are normal.  The vertebrobasilar junction and basilar artery are normal.   Soft tissue neck     There is no evidence of mass, cyst or adenopathy within the neck. There are mild/moderate bony productive and degenerative changes especially at C4/C5 without bony canal stenosis or bony foraminal narrowing.   Intracranial   There is no evidence of aneurysm, vascular malformation or branch occlusion.       * Normal exam   MACRO: Murphy Tirado discussed the significance and urgency of this critical finding by telephone with  DORENE PALACIO on 9/21/2024 at 11:18 am.  (**-RCF-**) Findings:  See findings.     Signed by: Murphy Tirado 9/21/2024 11:18 AM Dictation workstation:   [id number]    CT brain attack head wo IV contrast    Result Date: 9/21/2024  Interpreted By:  Reynaldo Crowley, STUDY: CT BRAIN ATTACK HEAD WO IV CONTRAST; ;  9/21/2024 10:33 am   INDICATION: Signs/Symptoms:Right-sided weakness; brain attack.     COMPARISON: None.   ACCESSION NUMBER(S): [id number]   ORDERING CLINICIAN: DORENE PALACIO   TECHNIQUE: Serial axial unenhanced CT images obtained of the head with images reformatted in the coronal and sagittal projection.   All CT examinations are performed with 1 or more of the following dose reduction techniques: Automated exposure control, adjustment of mA and/or kv according to patient's size, or use of iterative reconstruction techniques.   FINDINGS: Moderate parenchymal volume loss with prominence of the ventricles, sulci, and cisterns. Gray-white matter differentiation maintained. There is a remote lacunar infarct in the right caudate head. Also, remote lacunar infarct within the left thalamus about the internal capsule. No intra-axial or extra-axial blood or fluid collections are identified.   Visualized osseous structures demonstrate findings suggesting retention cysts within bilateral maxillary sinuses for example in the right maxillary sinus measuring 2.6 cm. Paranasal sinuses and mastoid air cells are  unremarkable.               1. No acute intracranial abnormality   2. Remote lacunar infarcts in the right caudate head and left thalamus.     MACRO: Reynaldo Crowley discussed the significance and urgency of this critical finding by telephone with  DORENE PALACIO on 9/21/2024 at 10:57 am.  (**-RCF-**) Findings:  See findings.     Signed by: Reynaldo Crowley 9/21/2024 10:57 AM Dictation workstation:   [id number]      Assessment/Plan   ESBL E. Coli Urinary tract infection  Chronic kidney diease, stage IV  Multiple myeloma on chemotherapy, Has right chest port       IV meropenem  Blood culture x 2  Monitor Temperature and WBC  Supportive care    Discussed with Dr. Bose    Long term plan IV meropenem 1 gram every 12 hours for total 7 days till 10/1/2024-see discharge rec  Follow up blood culture-may extend or adjust treatment based on blood culture results       Nanette Edouard, APRN-CNP

## 2024-09-25 NOTE — PROGRESS NOTES
Speech-Language Pathology     Speech-Language/Cognition Treatment Note    Patient Name: Lisbeth Cruz  MRN: [id number]  : 1960  Today's Date: 24  Start Time: 1330  Stop Time: 1345  Time Calculation (min): 15 min    Total number of visits in POC: 2/3 sp      ASSESSMENT  SLP TX Intervention Outcome: Making Progress Towards Goals  Treatment Tolerance: Patient tolerated treatment well      PLAN    Plan  Inpatient/Swing Bed or Outpatient: Inpatient  Treatment/Interventions: Cognitive communication functioning  SLP TX Plan: Continue Plan of Care  SLP Plan: Skilled SLP  SLP Frequency: 3x per week  Duration: 2 weeks  SLP Discharge Recommendations: Continue skilled SLP services at the next level of care.  Pt would benefit from HIGH intensity rehab programs to return to prior level of function.  Next Treatment Priority: problem solving, reading comp  Discussed POC: Patient  Discussed Risks/Benefits: Yes  Patient/Caregiver Agreeable: Yes    Pt requires continued SLP services to improve functional communication skills for complex thoughts/ideas and improve cognitive-linguistic skills to promote safe return to home environment.    SUBJECTIVE  Pt pleasant and cooperative, upright in bed for assessment.      General Visit Information:  General  Referred By: LINDA Kulkarni  Past Medical History Relevant to Rehab: anxiety, cardiomyopathy, HTN, dyspnea, chronic diastolic heart failure  Caregiver Feedback: DTR  Prior to Session Communication: Bedside nurse    Pain Assessment:  Pain Assessment  Pain Assessment: 0-10  0-10 (Numeric) Pain Score: 0 - No pain  Davey-Baker FACES Pain Rating: No hurt    Oxygen Status:  Room air      OBJECTIVE    Cognitive Linguistics:   Orientation, problem solving    Language Comprehension:   Language Comprehension   Language Comprehension Interventions: Understanding Written Language      Goals:  (Established 24, projected discontinuation 2 weeks)     - Pt will complete  functional reading tasks at phrase level with 90% acc to improve ability to use external visual supports.              CURRENT STATUS: 85%  PROGRESS: Improvement from baseline.  Pt able to see better without glasses on. (Glasses she has with her are for distance)     - Pt will express temporal orientation with use of visual supports with 100% acc in order to improve ability to track time and plan for future events.               CURRENT STATUS: 100% acc with use of visual supports              PROGRESS: Improved significantly from baseline     - Pt will complete functional problem solving tasks with 80% acc in order to make decisions about care needs and participate safely in ADLs.              CURRENT STATUS: 66%              PROGRESS: Improved ability to focus and respond from initial eval     - Ongoing assessment as indicated for further goal development.               CURRENT STATUS: Not addressed              PROGRESS: N/A       Inpatient Education:   Individual(s) Educated: Patient  Verbal Education : Results and recommendations  Risk and Benefits Discussed with Patient/Caregiver/Other: yes  Patient/Caregiver Demonstrated Understanding: yes  Plan of Care Discussed and Agreed Upon: yes  Patient Response to Education: Patient/Caregiver Verbalized Understanding of Information

## 2024-09-25 NOTE — PROGRESS NOTES
Physical Therapy    Physical Therapy Treatment    Patient Name: Lisbeth Cruz  MRN: [id number]  Department: [address]  Room: [address] [place] [address]  Today's Date: 9/25/2024  Time Calculation  Start Time: 1425  Stop Time: 1449  Time Calculation (min): 24 min         Assessment/Plan   PT Assessment  PT Assessment Results: Decreased strength, Decreased endurance, Impaired balance, Decreased mobility, Impaired judgement, Decreased safety awareness  Rehab Prognosis: Good  Barriers to Discharge: assist and AD required for safe mobility  Evaluation/Treatment Tolerance: Patient tolerated treatment well  Medical Staff Made Aware: Yes  Strengths: Premorbid level of function  Barriers to Participation: Comorbidities  End of Session Communication: Bedside nurse  Assessment Comment: Increased activity on feet this date, fatigue noted. Pt gives good effort, progressing well.  End of Session Patient Position: Up in chair, Alarm on  PT Plan  Inpatient/Swing Bed or Outpatient: Inpatient  PT Plan  Treatment/Interventions: Transfer training, Gait training, Balance training, Strengthening, Endurance training, Therapeutic exercise, Therapeutic activity  PT Plan: Ongoing PT  PT Frequency: 5 times per week  PT Discharge Recommendations: High intensity level of continued care  Equipment Recommended upon Discharge: Wheeled walker  PT Recommended Transfer Status: Assist x1, Assistive device  PT - OK to Discharge: Yes      General Visit Information:   PT  Visit  PT Received On: 09/25/24  General  Reason for Referral: Impaired Mobility-CVA  Referred By: LINDA Kulkarni  Past Medical History Relevant to Rehab: anxiety, cardiomyopathy, HTN, dyspnea, chronic diastolic heart failure  Family/Caregiver Present: No  Prior to Session Communication: Bedside nurse  Patient Position Received: Bed, 3 rail up, Alarm on  Preferred Learning Style: verbal, visual  General Comment: Pt agreeable to PT session, reports was up in chair earlier, has been to B[facility] but not  bathroom.    Subjective   Precautions:  Precautions  Medical Precautions: Fall precautions    Vital Signs (Past 2hrs)        Date/Time Vitals Session Patient Position Pulse Resp SpO2 BP MAP (mmHg)    09/25/24 1425 During PT  Sitting  89  --  95 %  --  --                   Objective   Pain:  Pain Assessment  Pain Assessment: 0-10  0-10 (Numeric) Pain Score: 0 - No pain  Cognition:  Cognition  Cognition Comments: pleasant, cooperative, slightly decreased memory  Insight: Mild  Processing Speed: Delayed  Coordination:  Coordination Comment: slow  Postural Control:  Postural Control  Posture Comment: forward head, rounded shoulders  Dynamic Standing Balance  Dynamic Standing-Balance Support: Bilateral upper extremity supported  Dynamic Standing-Level of Assistance: Contact guard  Dynamic Standing-Balance: Turning (amb)  Dynamic Standing-Comments: Fair (requires UE support)    Activity Tolerance:  Activity Tolerance  Endurance:  (mild SOB w/ prolonged activity)  Activity Tolerance Comments: Fair, mild fatigue at LEs  Treatments:  Therapeutic Exercise  Therapeutic Exercise Performed: Yes  Therapeutic Exercise Activity 1: Pt performed seated bilat ankle pumps, LAQs, hip flexion x 15-20 reps w/ fatigue noted. Also performed standing hip abd, hip ext x 15 reps.    Therapeutic Activity  Therapeutic Activity Performed: Yes  Therapeutic Activity 1: Pt IND w/ hygiene after using commode, IND w/ brief up/down, changing of pad. Supervision at sink    Balance/Neuromuscular Re-Education  Balance/Neuromuscular Re-Education Activity Performed: Yes  Balance/Neuromuscular Re-Education Activity 1: Pt practiced sidestepping L & Rt around bed w/ BUE support on bedrails as needed, 12' x 2 each way, backwards amb around EOB w/ unilateral support on rail 12' x 2    Bed Mobility 1  Bed Mobility 1: Supine to sitting  Level of Assistance 1: Distant supervision  Bed Mobility Comments 1: to Lt EOB, HOB elevated slightly    Ambulation/Gait Training  1  Surface 1: Level tile  Device 1: Rolling walker, No device  Assistance 1: Contact guard  Comments/Distance (ft) 1: Pt amb ~ 50' x 2 w/ RW, cues for heel strike, head up and erect posture w/ good correction. Pt amb another 50' x 1 w/o AD noting decreased step length and heel strike, guarded, occasional reaching out for light UE support on furniture.  Transfer 1  Technique 1: Sit to stand, Stand to sit  Transfer Device 1: Walker  Transfer Level of Assistance 1: Close supervision  Trials/Comments 1: from EOB, to/from chair w/ arms x 3, to/from commode. Cues for safe hand placement w/ return to sit. Cues to keep RW in use until ready to sit.    Outcome Measures:  [facility] Basic Mobility  Turning from your back to your side while in a flat bed without using bedrails: None  Moving from lying on your back to sitting on the side of a flat bed without using bedrails: None  Moving to and from bed to chair (including a wheelchair): A little  Standing up from a chair using your arms (e.g. wheelchair or bedside chair): A little  To walk in hospital room: A little  Climbing 3-5 steps with railing: A little  Basic Mobility - Total Score: 20    Education Documentation  Home Exercise Program, taught by Malika Villar PT at 9/25/2024  3:42 PM.  Learner: Patient  Readiness: Acceptance  Method: Explanation  Response: Verbalizes Understanding, Needs Reinforcement    Mobility Training, taught by Malika Villar, PT at 9/25/2024  3:42 PM.  Learner: Patient  Readiness: Acceptance  Method: Explanation  Response: Verbalizes Understanding, Needs Reinforcement    Education Comments  No comments found.        OP EDUCATION:       Encounter Problems       Encounter Problems (Active)       Balance       Standing Balance (Progressing)       Start:  09/23/24    Expected End:  10/07/24       Pt will demonstrate good static standing balance to promote safe participation with out of bed activity, transfers, and mobility              Mobility        Ambulation STG (Progressing)       Start:  09/23/24    Expected End:  10/07/24       Pt will ambulate 50' modified independent assist with walker to promote safe home mobility           Ambulation LTG (Progressing)       Start:  09/23/24    Expected End:  10/07/24       Pt will ambulate 250' modified independent assist with walker to promote safe home mobility              PT Transfers       Supine to sit (Progressing)       Start:  09/23/24    Expected End:  10/07/24       Pt will transfer supine to sitting at edge of bed with modified independent assist to promote acute care out of bed activity           Sit to stand (Progressing)       Start:  09/23/24    Expected End:  10/07/24       Pt will transfer sit to standing position with modified independent assist and walker to promote safe out of bed activity              Safety       Safe Mobility Techniques (Progressing)       Start:  09/23/24    Expected End:  10/07/24       Pt will correctly identify and demonstrate safe mobility techniques to reduce their risks for falls during their acute care stay

## 2024-09-25 NOTE — PROGRESS NOTES
"   09/25/24 1123   Discharge Planning   Home or Post Acute Services Post acute facilities (Rehab/SNF/etc)   Type of Post Acute Facility Services Rehab  (FOC is  [facility].  Waiting on liaison to discuss chemo appointment with daughter.  Unable to have chemo inpatient.  No precert needed.)   Expected Discharge Disposition Rehab   Does the patient need discharge transport arranged? Yes   RoundTrip coordination needed? Yes   Has discharge transport been arranged? No     1340  Spoke to patient and daughter Diana [phone] regarding chemo treatments and FOC.  Both are in agreement with postponing chemo treatment scheduled for tomorrow.  FOC is [facility].  Liaison made aware.  [facility] is requesting a 5 pm.     1410  [facility] liaison advised that they have to delay admission until 9/26/24.    1415  Per  [facility] liaison, \"Pt's oncologist out town ([place]) and Dr Lino Grimaldo is covering and agreed for chemo hold ok for 2 weeks.\"  No beds until ~ 9/27.    1530  [facility] liaison advised that bed is available this evening.  Requested 7 pm .    1635   scheduled for 7 pm via [employer].  Notified patient's daughter Diana [phone].  Both patient and daughter remain in agreement with discharge plan.  "

## 2024-09-25 NOTE — PROGRESS NOTES
Occupational Therapy    Occupational Therapy Treatment    Name: Lisbeth Cruz  MRN: [id number]  Department: [address]  Room: [address] [place] [address]  Date: 09/25/24  Time Calculation  Start Time: 1507  Stop Time: 1527  Time Calculation (min): 20 min    Assessment:  OT Assessment: Pt is making good progress toward OT POC. Recommend continued OT serfvices to increase independence indaily tasks and functional mobility.  Prognosis: Good  Evaluation/Treatment Tolerance: Patient tolerated treatment well  End of Session Communication: Bedside nurse  End of Session Patient Position: Up in chair, Alarm on  Plan:  Treatment Interventions: ADL retraining, Functional transfer training, UE strengthening/ROM, Endurance training, Cognitive reorientation, Patient/family training, Equipment evaluation/education, Compensatory technique education, Neuromuscular reeducation  OT Frequency: 4 times per week  OT Discharge Recommendations: Low intensity level of continued care, 24 hr supervision due to cognition  Equipment Recommended upon Discharge: Wheeled walker  OT Recommended Transfer Status: Assist of 1  OT - OK to Discharge: Yes    Subjective   Previous Visit Info:  OT Last Visit  OT Received On: 09/25/24  General:  General  Reason for Referral: decreased ADL's, CVA  Referred By: LINDA Kulkarni  Past Medical History Relevant to Rehab: anxiety, cardiomyopathy, HTN, dyspnea, chronic diastolic heart failure  Family/Caregiver Present: No  Prior to Session Communication: Bedside nurse  Patient Position Received: Up in chair, Alarm on  Preferred Learning Style: verbal, visual  General Comment: Pt agreeable to therapy  Precautions:  Medical Precautions: Fall precautions    Vital Signs (Past 2hrs)        Date/Time Vitals Session Patient Position Pulse Resp SpO2 BP MAP (mmHg)    09/25/24 1425 During PT  Sitting  89  --  95 %  --  --     09/25/24 1552 --  --  --  --  96 %  --  --     09/25/24 1558 --  --  70  16  100 %  130/78  90                         Pain Assessment:        Objective   Cognition:  Cognition Comments: plesant , cooperative  Insight: Mild  Processing Speed: Delayed  Activities of Daily Living: Grooming  Grooming Level of Assistance: Setup  Grooming Where Assessed: Chair  Grooming Comments: to brush teeth, comb hair    UE Bathing  UE Bathing Comments: completed earlier    LE Bathing  LE Bathing Comments: completed earlier    UE Dressing  UE Dressing Comments: completed earlier    Functional Standing Tolerance:     Bed Mobility/Transfers: Transfers  Transfer: Yes  Transfer 1  Transfer From 1: Chair with arms to  Transfer to 1: Stand  Technique 1: Sit to stand, Stand to sit  Transfer Device 1: Walker  Transfer Level of Assistance 1: Close supervision  Trials/Comments 1: to/ from chair w/ arms, verbal cues for hand placement      Functional Mobility:  Functional Mobility  Functional Mobility Performed: Yes  Functional Mobility 1  Surface 1: Level tile  Device 1: Rolling walker  Assistance 1: Close supervision  Comments 1: Pt ambulated from chair to doorway, up/ down hallway, up/ downhallway and back to chair w/ close supervision and FWW.     Therapy/Activity: Therapeutic Exercise  Therapeutic Exercise Performed: Yes (mild SOB noted w/ activity)  Therapeutic Exercise Activity 1: x15 (B shld flex)  Therapeutic Exercise Activity 2: x15 (B horiz shld abd/ add)  Therapeutic Exercise Activity 3: x15 (B elbow flex/ ext)  Therapeutic Exercise Activity 4: x15 (B forearm sup/ pron)  Therapeutic Exercise Activity 5: x15 (B wrist flex/ ext)    Therapeutic Activity  Therapeutic Activity Performed: Yes  Therapeutic Activity 1: Pt ambulated from chair to doorway, up/down hallway and back to chair w/ close supervision w/ FWW to promote increased activity tolerance and functional mobility for increased independence in daily tasks.            Outcome Measures:  [facility] Daily Activity  Putting on and taking off regular lower body clothing: A lot  Bathing (including  washing, rinsing, drying): A little  Putting on and taking off regular upper body clothing: A little  Toileting, which includes using toilet, bedpan or urinal: A little  Taking care of personal grooming such as brushing teeth: A little  Eating Meals: A little  Daily Activity - Total Score: 17        Education Documentation  No documentation found.  Education Comments  No comments found.      Goals:  Encounter Problems       Encounter Problems (Active)       OT Goals       ADLs (Progressing)       Start:  09/23/24    Expected End:  10/14/24       Patient will complete ADL tasks, with modified independence, using AE need in order to increase patient's safety and independence with self-care tasks.          Functional Transfers (Progressing)       Start:  09/23/24    Expected End:  10/14/24       Patient will complete functional transfers with modified independence, using least restrictive device, in order to increase patient's safety and independence with daily tasks.          B UE Strengthening (Progressing)       Start:  09/23/24    Expected End:  10/14/24       Patient will increase B UE strength to 4/5 for functional transfers.          Functional Mobility (Progressing)       Start:  09/23/24    Expected End:  10/14/24       Patient will demonstrate the ability to complete item retrieval and functional mobility with modified independence, in order to increase safety and independence with daily tasks.          Activity Tolerance (Progressing)       Start:  09/23/24    Expected End:  10/14/24       Patient will demonstrate the ability to participate in functional activity at least >/= 20 minutes in order to increase patient's safety and independence with daily tasks.

## 2024-09-25 NOTE — CARE PLAN
Problem: Skin  Goal: Participates in plan/prevention/treatment measures  Outcome: Progressing  Goal: Prevent/manage excess moisture  Outcome: Progressing  Goal: Prevent/minimize sheer/friction injuries  Outcome: Progressing  Flowsheets (Taken 9/25/2024 0101)  Prevent/minimize sheer/friction injuries:   HOB 30 degrees or less   Turn/reposition every 2 hours/use positioning/transfer devices   Increase activity/out of bed for meals   Use pull sheet  Goal: Promote/optimize nutrition  Outcome: Progressing  Goal: Promote skin healing  Outcome: Progressing  Flowsheets (Taken 9/25/2024 0101)  Promote skin healing:   Rotate device position/do not position patient on device   Protective dressings over bony prominences     Problem: Fall/Injury  Goal: Not fall by end of shift  Outcome: Progressing  Goal: Be free from injury by end of the shift  Outcome: Progressing  Goal: Verbalize understanding of personal risk factors for fall in the hospital  Outcome: Progressing  Goal: Verbalize understanding of risk factor reduction measures to prevent injury from fall in the home  Outcome: Progressing  Goal: Use assistive devices by end of the shift  Outcome: Progressing  Goal: Pace activities to prevent fatigue by end of the shift  Outcome: Progressing   The patient's goals for the shift include      The clinical goals for the shift include Patient will remain hemodynamically stable and get rest throughout shift.

## 2024-09-26 PROBLEM — C90.00 MULTIPLE MYELOMA NOT HAVING ACHIEVED REMISSION (MULTI): Status: ACTIVE | Noted: 2024-09-26

## 2024-09-26 LAB
ANION GAP SERPL CALCULATED.3IONS-SCNC: 14 MMOL/L (ref 10–20)
BUN SERPL-MCNC: 16 MG/DL (ref 6–23)
CALCIUM SERPL-MCNC: 7.9 MG/DL (ref 8.6–10.3)
CHLORIDE SERPL-SCNC: 110 MMOL/L (ref 98–107)
CO2 SERPL-SCNC: 20 MMOL/L (ref 21–32)
CREAT SERPL-MCNC: 1.5 MG/DL (ref 0.5–1.05)
EGFRCR SERPLBLD CKD-EPI 2021: 38 ML/MIN/1.73M*2
ERYTHROCYTE [DISTWIDTH] IN BLOOD BY AUTOMATED COUNT: 16.8 % (ref 11.5–14.5)
GLUCOSE SERPL-MCNC: 63 MG/DL (ref 74–99)
HCT VFR BLD AUTO: 30.5 % (ref 36–46)
HGB BLD-MCNC: 9.8 G/DL (ref 12–16)
MCH RBC QN AUTO: 32.1 PG (ref 26–34)
MCHC RBC AUTO-ENTMCNC: 32.1 G/DL (ref 32–36)
MCV RBC AUTO: 100 FL (ref 80–100)
NRBC BLD-RTO: 0 /100 WBCS (ref 0–0)
PLATELET # BLD AUTO: 105 X10*3/UL (ref 150–450)
POTASSIUM SERPL-SCNC: 3.5 MMOL/L (ref 3.5–5.3)
RBC # BLD AUTO: 3.05 X10*6/UL (ref 4–5.2)
SODIUM SERPL-SCNC: 140 MMOL/L (ref 136–145)
WBC # BLD AUTO: 4.3 X10*3/UL (ref 4.4–11.3)

## 2024-09-26 PROCEDURE — 97162 PT EVAL MOD COMPLEX 30 MIN: CPT | Mod: GP

## 2024-09-26 PROCEDURE — 85027 COMPLETE CBC AUTOMATED: CPT | Performed by: INTERNAL MEDICINE

## 2024-09-26 PROCEDURE — 1180000001 HC REHAB PRIVATE ROOM DAILY

## 2024-09-26 PROCEDURE — 97530 THERAPEUTIC ACTIVITIES: CPT | Mod: GO

## 2024-09-26 PROCEDURE — 2500000002 HC RX 250 W HCPCS SELF ADMINISTERED DRUGS (ALT 637 FOR MEDICARE OP, ALT 636 FOR OP/ED): Performed by: INTERNAL MEDICINE

## 2024-09-26 PROCEDURE — 97116 GAIT TRAINING THERAPY: CPT | Mod: GP

## 2024-09-26 PROCEDURE — 97112 NEUROMUSCULAR REEDUCATION: CPT | Mod: GP

## 2024-09-26 PROCEDURE — 97165 OT EVAL LOW COMPLEX 30 MIN: CPT | Mod: GO

## 2024-09-26 PROCEDURE — 99223 1ST HOSP IP/OBS HIGH 75: CPT | Performed by: INTERNAL MEDICINE

## 2024-09-26 PROCEDURE — 2500000001 HC RX 250 WO HCPCS SELF ADMINISTERED DRUGS (ALT 637 FOR MEDICARE OP): Performed by: INTERNAL MEDICINE

## 2024-09-26 PROCEDURE — 80048 BASIC METABOLIC PNL TOTAL CA: CPT | Performed by: INTERNAL MEDICINE

## 2024-09-26 PROCEDURE — 97535 SELF CARE MNGMENT TRAINING: CPT | Mod: GO

## 2024-09-26 PROCEDURE — 97110 THERAPEUTIC EXERCISES: CPT | Mod: GO

## 2024-09-26 PROCEDURE — 2500000004 HC RX 250 GENERAL PHARMACY W/ HCPCS (ALT 636 FOR OP/ED): Performed by: INTERNAL MEDICINE

## 2024-09-26 PROCEDURE — 92523 SPEECH SOUND LANG COMPREHEN: CPT | Mod: GN

## 2024-09-26 ASSESSMENT — PAIN - FUNCTIONAL ASSESSMENT
PAIN_FUNCTIONAL_ASSESSMENT: 0-10

## 2024-09-26 ASSESSMENT — BALANCE ASSESSMENTS
STANDING ON ONE LEG: UNABLE TO TRY NEEDS ASSIST TO PREVENT FALL
PLACE ALTERNATE FOOT ON STEP OR STOOL WHILE STANDING UNSUPPORTED: LOOKS BEHIND ONE SIDE ONLY OTHER SIDE SHOWS LESS WEIGHT SHIFT
STANDING UNSUPPORTED WITH EYES CLOSED: ABLE TO STAND 10 SECONDS SAFELY
PICK UP OBJECT FROM THE FLOOR FROM A STANDING POSITION: ABLE TO PICK UP SLIPPER BUT NEEDS SUPERVISION
STANDING UNSUPPORTED: ABLE TO STAND SAFELY FOR 2 MINUTES
REACHING FORWARD WITH OUTSTRETCHED ARM WHILE STANDING: CAN REACH FORWARD 12 CM (5 INCHES)
STANDING TO SITTING: SITS SAFELY WITH MINIMAL USE OF HANDS
TURN 360 DEGREES: ABLE TO TURN 360 DEGREES SAFELY BUT SLOWLY
SITTING WITH BACK UNSUPPORTED BUT FEET SUPPORTED ON FLOOR OR ON A STOOL: ABLE TO SIT SAFELY AND SECURELY FOR 2 MINUTES
STANDING UNSUPPORTED ONE FOOT IN FRONT: NEEDS HELP TO STEP BUT CAN HOLD 15 SECONDS
STANDING TO SITTING: ABLE TO STAND INDEPENDENTLY USING HANDS
TRANSFERS: ABLE TO TRANSFER SAFELY DEFINITE NEED OF HANDS
PLACE ALTERNATE FOOT ON STEP OR STOOL WHILE STANDING UNSUPPORTED: NEEDS ASSISTANCE TO KEEP FROM FALLING/UNABLE TO TRY
STANDING UNSUPPORTED WITH FEET TOGETHER: NEEDS HELP TO ATTAIN POSITION BUT ABLE TO STAND 15 SECONDS FEET TOGETHER
LONG VERSION TOTAL SCORE (MAX 56): 35

## 2024-09-26 ASSESSMENT — ENCOUNTER SYMPTOMS
SHORTNESS OF BREATH: 0
COUGH: 0
VOMITING: 0
ABDOMINAL PAIN: 0
FEVER: 0
NAUSEA: 0
CHILLS: 0
APPETITE CHANGE: 0
DIARRHEA: 0
HEADACHES: 0

## 2024-09-26 ASSESSMENT — BRIEF INTERVIEW FOR MENTAL STATUS (BIMS)
WHAT YEAR IS IT: MISSED BY 1 YEAR
BIMS SUMMARY SCORE: 9
INITIAL REPETITION OF BED BLUE SOCK - FIRST ATTEMPT: 3
ASKED TO RECALL BED: NO, COULD NOT RECALL
ASKED TO RECALL BLUE: YES, AFTER CUEING ("A COLOR")
WHAT DAY OF THE WEEK IS IT: CORRECT
WHAT MONTH IS IT: ACCURATE WITHIN 5 DAYS
ASKED TO RECALL SOCK: NO, COULD NOT RECALL

## 2024-09-26 ASSESSMENT — COGNITIVE AND FUNCTIONAL STATUS - GENERAL
HELP NEEDED FOR BATHING: A LITTLE
PERSONAL GROOMING: A LITTLE
DRESSING REGULAR UPPER BODY CLOTHING: A LITTLE
TOILETING: A LITTLE
DAILY ACTIVITIY SCORE: 19
DRESSING REGULAR LOWER BODY CLOTHING: A LITTLE

## 2024-09-26 ASSESSMENT — PAIN SCALES - GENERAL
PAINLEVEL_OUTOF10: 0 - NO PAIN
PAINLEVEL_OUTOF10: 0 - NO PAIN
PAINLEVEL_OUTOF10: 6
PAINLEVEL_OUTOF10: 0 - NO PAIN
PAINLEVEL_OUTOF10: 0 - NO PAIN

## 2024-09-26 ASSESSMENT — ACTIVITIES OF DAILY LIVING (ADL)
BATHING_ASSISTANCE: STAND BY
HOME_MANAGEMENT_TIME_ENTRY: 30
ADL_ASSISTANCE: INDEPENDENT
ADL_ASSISTANCE: INDEPENDENT

## 2024-09-26 NOTE — PROGRESS NOTES
"Lisbeth Cruz is a 64 y.o. female on day 1 of admission presenting with Ischemic stroke (Multi).    Subjective   Interval History:   Patient seen and examined  Transferred to [facility] for rehab yesterday  Afebrile  No shortness of breath cough or chest pain  No nausea, vomiting or diarrhea  No adverse effects from antibiotics  No new complaints           Objective   Range of Vitals (last 24 hours)  Heart Rate:  [70-89]   Temp:  [35.9 °C (96.6 °F)-36.6 °C (97.9 °F)]   Resp:  [16-18]   BP: (116-130)/(74-80)   Height:  [154.9 cm (5' 0.98\")-155 cm (5' 1.02\")]   Weight:  [60.5 kg (133 lb 6.4 oz)]   SpO2:  [95 %-100 %]   Daily Weight  09/25/24 : 60.5 kg (133 lb 6.4 oz)    Body mass index is 25.19 kg/m².    Physical Exam  Constitutional:       Appearance: Normal appearance.   HENT:      Head: Normocephalic and atraumatic.      Nose: Nose normal.      Mouth/Throat:      Mouth: Mucous membranes are moist.      Pharynx: Oropharynx is clear.   Eyes:      General: No scleral icterus.  Cardiovascular:      Rate and Rhythm: Normal rate and regular rhythm.      Comments: Right chest port   Pulmonary:      Effort: Pulmonary effort is normal.      Breath sounds: Normal breath sounds.   Abdominal:      General: Bowel sounds are normal.      Palpations: Abdomen is soft.   Musculoskeletal:         General: Normal range of motion.      Cervical back: Normal range of motion and neck supple.   Skin:     General: Skin is warm and dry.   Neurological:      General: No focal deficit present.      Mental Status: She is alert and oriented to person, place, and time. Mental status is at baseline.   Psychiatric:         Mood and Affect: Mood normal.         Behavior: Behavior normal.     Antibiotics  meropenem (Merrem) IV in 250 mL NS  MEROPENEM IV  ML NS    Relevant Results  Labs  Results from last 72 hours   Lab Units 09/26/24  0406 09/24/24  0544   WBC AUTO x10*3/uL 4.3* 4.2*   HEMOGLOBIN g/dL 9.8* 9.5*   HEMATOCRIT % 30.5* 29.5* " "  PLATELETS AUTO x10*3/uL 105* 109*     Results from last 72 hours   Lab Units 09/26/24  0406 09/24/24  0544   SODIUM mmol/L 140 137   POTASSIUM mmol/L 3.5 3.7   CHLORIDE mmol/L 110* 111*   CO2 mmol/L 20* 19*   BUN mg/dL 16 13   CREATININE mg/dL 1.50* 1.58*   GLUCOSE mg/dL 63* 76   CALCIUM mg/dL 7.9* 8.3*   ANION GAP mmol/L 14 11   EGFR mL/min/1.73m*2 38* 36*         Estimated Creatinine Clearance: 30.8 mL/min (A) (by C-G formula based on SCr of 1.5 mg/dL (H)).  No results found for: \"CRP\"  Microbiology  Susceptibility data from last 14 days.  Collected Specimen Info Organism Amoxicillin/Clavulanate Ampicillin Ampicillin/Sulbactam Aztreonam Cefazolin Cefazolin (uncomplicated UTIs only) Cefepime Cefotaxime Ceftazidime Ceftriaxone Ciprofloxacin Ertapenem   09/21/24 Urine from Clean Catch/Voided Escherichia coli  I  R  I  R  R  R  R  R  R  R  R  S     Collected Specimen Info Organism Gentamicin Meropenem Nitrofurantoin Piperacillin/Tazobactam Trimethoprim/Sulfamethoxazole   09/21/24 Urine from Clean Catch/Voided Escherichia coli  S  S  S  S  R       Imaging  Reviewed      Assessment/Plan   ESBL E. Coli Urinary tract infection  Chronic kidney diease, stage IV  Multiple myeloma on chemotherapy, Has right chest port         IV meropenem  Follow Blood culture   Monitor Temperature and WBC  Supportive care     Discussed with Dr. Bose     Long term plan IV meropenem 1 gram every 12 hours for total 7 days till 10/1/2024  Follow up blood culture-may extend or adjust treatment based on blood culture results     Nanette Edouard, APRN-CNP  "

## 2024-09-26 NOTE — CARE PLAN
The patient's goals for the shift include sleep good    The clinical goals for the shift include maintain safety

## 2024-09-26 NOTE — PROGRESS NOTES
Patient is a 64 year old female that admitted to [facility] acute rehab on 09/25/2024 from [facility]. Care transitions id following for discharge planning. Care transitions met with the patient to review care transitions role and acute rehab discharge planning process. Identified patients main  as daughter Diana at   [phone]. Patient currently living with daughter and son and 2 grandchildren. Patient was independent with ADL's, IADL's and ambulation prior to event. Patient was also driving as well. Patient does wear corrective lenses. Patient has dentures but rarely wears them. Patient does not wear any hearing aides. Patient has never received dialysis in the past. Patient lives in a 2 story condo but patient has first floor set up with bedroom and full bathroom. Walk in shower with grab bar. Patient does have a rollator for longer community distances. Patient uncertain of PCP and stated that she mostly uses her oncologist for most medical visits. Care transitions will continue to follow as a resource for transition of care and will facilitate family training, updates for insurance, meeting with patient and family to answer questions or concerns and collaborate with IDT on patient needs to ensure safe return home. No ADOD at this time due to ne admission status.

## 2024-09-26 NOTE — NURSING NOTE
Assumed care of patient at 0730. Patient here s/p right frontal lobe infarct. Generalized weakness slightly more on the left. History of multiple myeloma and stage IV kidney disease. PICC to right upper chest. Has meropenem, 1g Q12 for 6 days d/t UTI. On RA. Call light is within reach. Will continue to monitor.

## 2024-09-26 NOTE — CARE PLAN
The patient's goals for the shift include get some rest    The clinical goals for the shift include maintian safety    Over the shift, the patient did not make progress toward the following goals. Barriers to progression include impaired mobility. Recommendations to address these barriers include improve mobility.

## 2024-09-26 NOTE — CARE PLAN
Problem: IRF PT STG Problem  Goal: Patient will transfer sit to stand and stand to sit with setup assist to facilitate mobility.  Outcome: Progressing  Goal: Patient will transfer bed to chair and chair to bed with setup assist to facilitate mobility.  Outcome: Progressing  Goal: Patient will amb 125 feet no device including two turns on even surface with supervision assist to facilitate safe mobility.    Outcome: Progressing  Goal: Patient will negotiate 14 stairs with two rail(s) and setup} assist with no device for in home and community.  Outcome: Progressing  Goal: Patient will perform TUG with least restrictive assistive device in 25 seconds or less to promote mobility and reduce fall risk with dynamic standing tasks.   Outcome: Progressing  Goal: Patient will improve GARAY score to  41/56  to promote mobility and reduce fall risk with dynamic standing tasks.   Outcome: Progressing     Problem: IRF PT LTG Problem  Goal: Patient will transfer sit to stand and stand to sit with independent assist to facilitate mobility.  Outcome: Progressing  Goal: Patient will transfer bed to chair and chair to bed with independent assist to facilitate mobility.  Outcome: Progressing  Goal: Patient will amb 150+ feet no device including two turns on even, uneven surface with independent assist to facilitate safe mobility.    Outcome: Progressing  Goal: Patient will negotiate 14 stairs with two rail(s) and independent} assist with no device for in home and community.  Outcome: Progressing  Goal: Patient will perform TUG with least restrictive assistive device in 20 seconds or less to promote mobility and reduce fall risk with dynamic standing tasks.   Outcome: Progressing  Goal: Patient will improve GARAY score to  45/56  to promote mobility and reduce fall risk with dynamic standing tasks.   Outcome: Progressing  Goal: Patient will increase LLE strength to 4/5 to improve functional mobility.   Outcome: Progressing

## 2024-09-26 NOTE — PROGRESS NOTES
Speech-Language Pathology    SLP Adult IRF CCR Speech-Language Cognition Evaluation    Patient Name: Lisbeth Cruz  MRN: [id number]  Today's Date: 9/26/2024   Time Calculation  Start Time: 1010  Stop Time: 1040  Time Calculation (min): 30 min       Current Problem:   Acute/subacute lacunar infarction in the right frontal lobe white matter. Patient also found to have UTI.     FROM H&P: 64-year-old woman with a history of multiple myeloma and stage IV chronic kidney disease who presented with weakness, seems to be more on the right. She was found to have right frontal lobe stroke. There was some question about a history of atrial fibrillation but that could not be confirmed and Plavix was added to the aspirin after consultation with neurology and cardiology. Zio patch to be placed and follow-up with cardiology. She is currently on chemotherapy for her multiple myeloma. She is up and ambulatory, using the walker and improving rapidly. She tells me she cannot tell which side is weaker the right or the left. Denies chest pain shortness of breath. Appetite is good and the bowels are moving. Also found incidentally with urinary tract infection, ESBL organism and is on meropenem through October 1, 2024.     SLP Assessment:   MOCA score is 23/30 (> or equal to 26/30 is WNL)  which indicates mild cognitive impairment. Areas of difficulty include attention, thought fluency, abstraction, and delayed STM/recall. Pt did appear [place] throughout assessment. Consider ongoing assessment via CLQT.    Noted mild R sided facial, lingual asymmetry/weakness. Pt is edentulous. Diadochokinetic was somewhat slow, although adequate. Pt feels that articulation was WFL. Pt has missing teeth which may also affect overall speech communication.      SLP Plan:  Plan  Inpatient/Swing Bed or Outpatient: Inpatient  Treatment/Interventions: Cognitive communication functioning  SLP Plan: Skilled SLP  SLP Frequency: 4x per week  Duration: 2 weeks  Next  Treatment Priority: CLQT, reading  Discussed POC: Patient, Nursing  Discussed Risks/Benefits: Yes, Patient, Nursing  Patient/Caregiver Agreeable: Yes      Subjective   Pt seen upright in wheelchair.      General Visit Information:  General Information  Chart Reviewed: Yes  Reason for Referral: Speech-Language/Cognition Evaluation in the setting of ischemic stroke  Referred By: Dr. Griggs  Past Medical History Relevant to Rehab: anxiety, cardiomyopathy, HTN, dyspnea, chronic diastolic heart failure  Prior to Session Communication: Bedside nurse      Objective   SPEECH-LANGUAGE GOALS (established 9/26, anticipate end 2 weeks):  Pt will increase thought organization skills to 80% accuracy with cues to participate in higher level language tasks.  PROGRESS: established  STATUS: continue  2.  Pt will complete functional problem solving tasks with 80% acc in order to make decisions about care needs and participate safely in ADLs.  PROGRESS: established  STATUS: continue  3. Ongoing assessment if indicated for further goal development, as needed.  PROGRESS: established  STATUS: Consider CLQT to further assess STM/recall and attention areas.    LTG 1: Pt will optimize cognitive-linguistic skills necessary for return to least restrictive living environment and to reduce caregiver dependence.     Pain:  Pain Assessment  0-10 (Numeric) Pain Score: 0 - No pain    Cognition:  Cognition  Orientation Level: Oriented X4  Memory: Exceptions to WFL  Processing Speed: Delayed    Auditory Comprehension:   Auditory Comprehension  Yes/No Questions: Within Functional Limits  Commands: Within Functional Limits      Verbal:  Verbal Expression  Primary Mode of Expression: Verbal  Primary Language: English  Confrontation Naming: Within Functional Limits  Conversation: Within Functional Limits    SLP Outcome Measures:  MoCA Certification  I have been Certified to use this cognitive screening instrument, via training on www.mocatest.org. :  Yes    MoCA  Visuospatial/Executive: 5  Naming: 3  Memory (Score '0' as this is an Unscored Section): 0  Attention: Read List of Digits: 2  Attention: Read List of Letters: 0  Attention: Serial Sevens: 3  Language: Repeat: 2  Language: Fluency: 0  Abstraction: 1  Delayed Recall: 2  Orientation: 5    Memory Index Score (MIS) is 13/15     Inpatient Education:  Adult Inpatient Education  Individual(s) Educated: Patient  Verbal Education : MOCA results  Risk and Benefits Discussed with Patient/Caregiver/Other: yes  Patient/Caregiver Demonstrated Understanding: yes  Plan of Care Discussed and Agreed Upon: yes  Patient Response to Education: Patient/Caregiver Verbalized Understanding of Information    Consultations/Referrals/Coordination of Services: Discussed with RN.

## 2024-09-26 NOTE — PROGRESS NOTES
PT Evaluation and Treatment   09/26/24 1301-7348   PT  Visit   PT Received On 09/26/24   General   Reason for Referral Patient is a 64 year old female who presented to the ED with c/o Left sided weakness and stroke like symptoms. MRI of the brain completed indicating: Acute/subacute lacunar infarction in the right frontal lobe white  matter measuring a proximally 3 mm in size. No hemorrhage or  measurable mass effect *No evidence of intracranial mass, extra-axial  collection or cerebral hemorrhage.Patient also found to have UTI.   Referred By Dr. Griggs   Past Medical History Relevant to Rehab anxiety, cardiomyopathy, HTN, dyspnea, chronic diastolic heart failure   General Comment Patient is cleared by nursing for therapy. Patient in bed upon arrival and agreeable to participate   Precautions   Medical Precautions Fall precautions   Precautions Comment Alittle Nauseous   Pain Assessment   Pain Assessment 0-10   0-10 (Numeric) Pain Score 6  (with stairs otherwise 0/10)   Pain Type Chronic pain   Pain Location Hip   Pain Orientation Left   Cognition   Overall Cognitive Status WFL   Orientation Level Oriented X4   Cognition Comments pleasant and cooperative. able to follow commands appropriately   Insight Mild   Processing Speed Delayed   Home Living   Type of Home Condo   Lives With Adult children;Grandchildren  (Daughter, son, and 7 year old twin grandkids)   Home Adaptive Equipment Walker rolling or standard  (rollator)   Home Layout Two level;Able to live on main level with bedroom/bathroom   Home Access Stairs to enter without rails   Entrance Stairs-Rails None   Entrance Stairs-Number of Steps 1 small step   Bathroom Shower/Tub Walk-in shower   Bathroom Toilet Handicapped height   Bathroom Equipment Grab bars in shower;Shower chair with back   Home Living Comments patient reports independent with ADLs at baseline   Prior Function Per Pt/Caregiver Report   Level of  [place] Independent with ADLs and functional transfers;Independent with homemaking with ambulation   Receives Help From Family   ADL Assistance Independent   Homemaking Assistance Independent   Ambulatory Assistance Independent   Vocational Retired  ([employer])   Leisure enjoys spending time with her grandchildren and family   Hand Dominance Right   Prior Function Comments Rollator for long distances   Activity Tolerance   Endurance Endurance does not limit participation in activity   Activity Tolerance Comments Fair+ tolerance   Sensation   Sensation Comment patient denies any numbness/tingling   Bed Mobility 1   Bed Mobility 1 Supine to sitting;Sitting to supine;Rolling right;Rolling left   Level of Assistance 1 Modified independent   Bed Mobility Comments 1 Flat bed   Transfer 1   Transfer From 1 Wheelchair to   Transfer to 1 Stand   Technique 1 Sit to stand;Stand to sit   Transfer Device 1 Walker   Transfer Level of Assistance 1 Close supervision;Contact guard   Transfers 2   Technique 2 Stand pivot   Transfer Level of Assistance 2 Contact guard   Trials/Comments 2 No AD   Ambulation/Gait Training 1   Surface 1 Level tile   Device 1 Rolling walker;No device   Assistance 1 Contact guard   Ambulation/Gait Training 2   Surface 2 Level tile   Device 2 No device   Assistance 2 Contact guard   Comments/Distance (ft) 2 75, 80 feet slow pace WBOS cautious pace, limited arm swing    Object From Floor   Devices No reacher;No device   Assist Level Contact guard   Comments Small Cone   Wheelchair Activities   Propulsion Yes   Propulsion Type 1 Manual   Level 1 Level tile   Method 1 Right upper extremity;Left upper extremity;Right lower extremity;Left lower extremity   Level of Assistance 1 Distant supervision   Description/Details 1 100 feet slow pace   RLE    RLE  WFL   Strength RLE   R Hip Flexion 4-/5   R Hip Extension 4-/5   R Hip ABduction 4-/5   R Hip ADduction 4-/5   R Knee Flexion 4/5   R Knee Extension  4/5   R Ankle Dorsiflexion 4+/5   R Ankle Plantar Flexion 4+/5   LLE    LLE  WFL   Strength LLE   L Hip Flexion 3+/5   L Hip Extension 3+/5   L Hip ABduction 3+/5   L Hip ADduction 3+/5   L Knee Flexion 4-/5   L Knee Extension 4-/5   L Ankle Dorsiflexion 3+/5   L Ankle Plantar Flexion 3+/5   PT Assessment   PT Assessment Results Decreased strength;Decreased endurance;Impaired balance;Decreased mobility;Impaired judgement;Decreased safety awareness   Strengths Ability to acquire knowledge;Coping skills   Barriers to Participation Comorbidities   End of Session Patient Position Up in chair;Alarm off, not on at start of session   IP OR SWING BED PT PLAN   Inpatient or Swing Bed Inpatient   PT Plan   Treatment/Interventions Bed mobility;Transfer training;Gait training;Stair training;Balance training;Strengthening;Therapeutic exercise;Therapeutic activity;Wheelchair management   PT Plan Ongoing PT   PT Frequency 90 min/5 days per week  (AND 45 min 1x/week)   Equipment Recommended upon Discharge Wheeled walker   PT Recommended Transfer Status Contact guard   PT - OK to Discharge Yes      09/26/24 0900   Butts Balance Scale   1. Sitting to Standing 3   2. Standing Unsupported 4   3. Sitting with Back Unsupported but Feet Supported on Floor or on a Stool 4   4. Standing to Sitting 4   5.  Transfers 3   6. Standing Unsupported with Eyes Closed 4   7. Standing Unsupported with Feet Together 1   8. Reach Forward with Outstretched Arm While Standing 3   9.  Object from Floor from a Standing Position 3   10. Turning to Look Behind Over Left and Right Shoulders While Standing 3   11. Turn 360 Degrees 2   12. Place Alternate Foot on Step or Stool While Standing Unsupported 0   13. Standing Unsupported One Foot in Front 1   14. Standing on One Leg 0   Butts Balance Score 35   Pediatric E = Exercise and Early Mobility   Level of Assistance Assistive person   Timed Up and Go Test   TUG Manual 27.03 seconds without AD Cgx1    Supine Therapeutic Exercise   Activity Repetitions Weights Comments   Bridging     [] 1 x 15   [x] 2 x 15   []  [] Bilateral   [] Right    [] Left     [] Bolster   [] No Bolster   Hip abd /add  [] 1 x 15   [x] 2 x 15   []  [x] Bilateral   [] Right    [] Left     Green tband    SLR  [x] 1 x 15   [] 2 x 15   []  [x] Bilateral   [] Right    [] Left     Effortful LLE with Left knee starting to bend once fatigued    Other  RPE 6/10 end of session                 Problem: IRF PT STG Problem  Goal: Patient will transfer sit to stand and stand to sit with setup assist to facilitate mobility.  Outcome: Progressing  Goal: Patient will transfer bed to chair and chair to bed with setup assist to facilitate mobility.  Outcome: Progressing  Goal: Patient will amb 125 feet no device including two turns on even surface with supervision assist to facilitate safe mobility.    Outcome: Progressing  Goal: Patient will negotiate 14 stairs with two rail(s) and setup} assist with no device for in home and community.  Outcome: Progressing  Goal: Patient will perform TUG with least restrictive assistive device in 25 seconds or less to promote mobility and reduce fall risk with dynamic standing tasks.   Outcome: Progressing  Goal: Patient will improve GARAY score to  41/56  to promote mobility and reduce fall risk with dynamic standing tasks.   Outcome: Progressing     Problem: IRF PT LTG Problem  Goal: Patient will transfer sit to stand and stand to sit with independent assist to facilitate mobility.  Outcome: Progressing  Goal: Patient will transfer bed to chair and chair to bed with independent assist to facilitate mobility.  Outcome: Progressing  Goal: Patient will amb 150+ feet no device including two turns on even, uneven surface with independent assist to facilitate safe mobility.    Outcome: Progressing  Goal: Patient will negotiate 14 stairs with two rail(s) and independent} assist with no device for in home and  community.  Outcome: Progressing  Goal: Patient will perform TUG with least restrictive assistive device in 20 seconds or less to promote mobility and reduce fall risk with dynamic standing tasks.   Outcome: Progressing  Goal: Patient will improve GARAY score to  45/56  to promote mobility and reduce fall risk with dynamic standing tasks.   Outcome: Progressing  Goal: Patient will increase LLE strength to 4/5 to improve functional mobility.   Outcome: Progressing

## 2024-09-26 NOTE — PROGRESS NOTES
Occupational Therapy  Evaluation and Treatment     09/26/24 [id number]   OT Last Visit   OT Received On 09/26/24   General   Reason for Referral Patient is a 64 year old female who presented to the ED with c/o R sided weakness and stroke like symptoms. MRI of the brain completed indicating: Acute/subacute lacunar infarction in the right frontal lobe white  matter measuring a proximally 3 mm in size. No hemorrhage or  measurable mass effect *No evidence of intracranial mass, extra-axial  collection or cerebral hemorrhage.Patient also found to have UTI.   Referred By Dr. Griggs   Past Medical History Relevant to Rehab anxiety, cardiomyopathy, HTN, dyspnea, chronic diastolic heart failure   Family/Caregiver Present No   Prior to Session Communication Bedside nurse   Patient Position Received Bed, 3 rail up;Alarm off, not on at start of session   Preferred Learning Style verbal;visual   General Comment Patient is cleared by nursing for therapy. Patient in bed upon arrival and agreeable to participate   Precautions   Medical Precautions Fall precautions   Pain Assessment   Pain Assessment 0-10   0-10 (Numeric) Pain Score 0 - No pain   Cognition   Overall Cognitive Status WFL   Orientation Level Oriented X4   Cognition Comments pleasant and cooperative. able to follow commands appropriately   Home Living   Type of Home Condo   Lives With Adult children;Grandchildren  (Daughter, son, and 7 year old twin grandkids)   Home Adaptive Equipment Walker rolling or standard  (rollator)   Home Layout Two level;Able to live on main level with bedroom/bathroom   Home Access Stairs to enter without rails   Entrance Stairs-Rails None   Entrance Stairs-Number of Steps 1 small step   Bathroom Shower/Tub Walk-in shower   Bathroom Toilet Handicapped height   Bathroom Equipment Grab bars in shower;Shower chair with back   Prior Function Per Pt/Caregiver Report   Level of [place] Independent with ADLs and functional transfers;Independent  with homemaking with ambulation   Receives Help From Family   ADL Assistance Independent   Homemaking Assistance Independent   Ambulatory Assistance Independent   Vocational Retired   Leisure enjoys spending time with her grandchildren and family   Hand Dominance Right   Prior Function Comments Rollator for long distances   IADL History   Homemaking Responsibilities Yes   IADL Comments shares IADLs with daughter   ADL   Eating Assistance Stand by   Eating Deficit Setup   Grooming Assistance Stand by   Grooming Deficit Steadying;Supervision/safety;Standing with assistive device  (assessed. tx includes patient standing sinkside to brush teeth and comb hair)   Bathing Assistance Stand by   Bathing Deficit Setup;Steadying;Supervision/safety  (assessed. tx includes patient sitting on shower chair with back to complete full bathing routine)   UE Dressing Assistance Stand by   UE Dressing Deficit Setup  (assessed, seated to doff gown, don dress)   LE Dressing Assistance Stand by   LE Dressing Deficit Setup;Steadying;Supervision/safety  (assessed. seated to doff socks and underwear. seated to don socks, underwear, steadying in standing to pull over hips)   Toileting Assistance with Device Stand by   Toileting Deficit Steadying;Supervison/safety;Grab bar use  (assessed. completes with supervision and cues for use of grab bars)   Activity Tolerance   Endurance Endurance does not limit participation in activity   Activity Tolerance Comments Fair+ tolerance   Bed Mobility   Bed Mobility Yes   Bed Mobility 1   Bed Mobility 1 Supine to sitting   Level of Assistance 1 Distant supervision   Bed Mobility Comments 1 head of bed elevated   Functional Mobility   Functional Mobility Performed Yes   Functional Mobility 1   Surface 1 Level tile   Device 1 Rolling walker   Assistance 1 Close supervision   Comments 1 to and from the bathroom.  (tx includes education on safe walker use)   Transfers   Transfer Yes   Transfer 1   Transfer From  1 Bed to   Transfer to 1 Stand   Technique 1 Sit to stand   Transfer Device 1 Walker   Transfer Level of Assistance 1 Close supervision   Trials/Comments 1 assessed and tx   Transfers 2   Transfer From 2 Toilet to   Transfer to 2 Stand   Technique 2 Sit to stand   Transfer Device 2 Walker   Transfer Level of Assistance 2 Close supervision   Trials/Comments 2 x2 trials. tx includes education on use of grab bars   Static Sitting Balance   Static Sitting-Balance Support Feet supported;No upper extremity supported   Static Sitting-Level of Assistance Distant supervision   Static Sitting-Comment/Number of Minutes at EOB   Dynamic Sitting Balance   Dynamic Sitting-Balance Support Feet supported;No upper extremity supported   Dynamic Sitting-Level of Assistance Distant supervision   Dynamic Sitting-Balance Reaching for objects;Reaching across midline   Dynamic Sitting-Comments during ADL tasks   Dynamic Standing Balance   Dynamic Standing-Balance Support No upper extremity supported   Dynamic Standing-Level of Assistance Close supervision   Dynamic Standing-Balance Forward lean;Reaching for objects;Reaching across midline   Dynamic Standing-Comments during ADL tasks   Vision - Basic Assessment   Current Vision Wears glasses all the time   Vision - Complex Assessment   Vision Comments denies any changes in vision   Sensation   Sensation Comment patient denies any numbness/tingling   Coordination   Movements are Fluid and Coordinated Yes   Finger to Nose Intact  (B UE)   Hand Function   Gross Grasp Functional   Coordination Functional   RUE    RUE  WFL   LUE    LUE WFL   IP OT Assessment   OT Assessment Patient is a 64 year old female admitted with +CVA and UTI. Patient will benefit from skilled OT to maximize safety and independence with daily tasks to increase safety and independence with daily tasks and improve patient's quality of life.   Prognosis Excellent   Barriers to Discharge None   Evaluation/Treatment Tolerance  Patient tolerated treatment well   End of Session Communication Bedside nurse   End of Session Patient Position Up in chair;Alarm off, not on at start of session  (seated in w/c)   OT Assessment   OT Assessment Results Decreased ADL status;Decreased upper extremity strength;Decreased safe judgment during ADL;Decreased endurance;Decreased functional mobility;Decreased gross motor control;Decreased IADLs   Strengths Ability to acquire knowledge;Physical health;Premorbid level of function;Support of extended family/friends   Barriers to Participation Comorbidities   Inpatient/Swing Bed or Outpatient   Inpatient/Swing Bed or Outpatient Inpatient   Inpatient Plan   Treatment Interventions ADL retraining;Functional transfer training;UE strengthening/ROM;Endurance training;Patient/family training;Equipment evaluation/education;Compensatory technique education   OT Frequency 5 times per week   OT Discharge Recommendations High intensity level of continued care   Equipment Recommended upon Discharge Wheeled walker   OT Recommended Transfer Status Stand by assist   OT - OK to Discharge Yes     Toilet Transfers   Toilet Transfer From Rolling walker   Toilet Transfer Type To and from   Toilet Transfer to Standard toilet   Toilet Transfer Technique Ambulating   Toilet Transfers Supervision   Toilet Transfers Comments x2 trials. use of grab bars   Shower Transfers   Shower Transfer From Walker   Shower Transfer Type To and from   Shower Transfer to Shower seat with back   Shower Transfer Technique Ambulating   Shower Transfers Supervision   Therapeutic Exercise   Therapeutic Exercise Activity 1 patient is seated in her w/c and completes 1x15 B UE exercises using 1# hand weight. Exercises include bicep curls, chest press, shoulder flex/ext, shoulder horizontal abduction/adduction, shoulder elevation/depression, shoulder reverse rolls, scapular protraction/retraction, forearm supination/pronation, and wrist flex/ext. Patient demo  fair+ form and tolerance with therapeutic rest breaks for maximal muscle benefit   Therapeutic Activity   Therapeutic Activity Performed Yes   Therapeutic Activity 1 patient is seated in her w/c and completes various functional reaching activities. activities challenge FMC, FM strengthening, functional reach, use of B UE, crossing midline, attention to task and visual scanning     [facility]     09/26/24 [id number]   [facility] Daily Activity   Putting on and taking off regular lower body clothing 3   Bathing (including washing, rinsing, drying) 3   Putting on and taking off regular upper body clothing 3   Toileting, which includes using toilet, bedpan or urinal 3   Taking care of personal grooming such as brushing teeth 3   Eating Meals 4   Daily Activity - Total Score 19   OT Adult Other Outcome Measures   9 Hole Peg Test R hand: 25.41 seconds; L hand: 36.16 seconds   Box and Block R hand: 28 blocks/min. L hand: 24 blocks/min   Other Outcome Measures [place] Cancelation: 2 minutes and 48 seconds. 0 misses, 0 distractors  (R hand : 59 pounds. L hand: 45 pounds)     GOALS  Problem: Bathing  Goal: LTG - Patient will utilize adaptive techniques to bathe body with Mod I  Outcome: Progressing     Problem: Dressings Lower Extremities  Goal: LTG - Patient will dress lower body with Mod I using AE as needed  Outcome: Progressing     Problem: Dressing Upper Extremities  Goal: LTG - Patient will complete upper body dressing with [place]  Outcome: Progressing     Problem: Grooming  Goal: LTG - Patient will complete daily grooming tasks with Mod I  Outcome: Progressing     Problem: Instrumental Activities of Daily Living  Goal: LTG - Patient will independently complete basic home making activities, using least restrictive device, to return to independent functioning at home  Outcome: Progressing  Goal: LTG - Patient will complete simple meal preparation activities with Mod I using least restrictive device  Outcome: Progressing      Problem: Functional Mobility  Goal: LTG - Patient will demonstrate safe kitchen mobility with Mod I using least restrictive device  Outcome: Progressing     Problem: Toileting  Goal: LTG - Patient will complete daily toileting tasks with Mod I  Outcome: Progressing     Problem: OT Transfers  Goal: LTG - Patient will transfer to car with Mod I using least restrictive device  Outcome: Progressing  Goal: LTG - Patient will transfer to tub/shower with Mod I using least restrictive device  Outcome: Progressing     Problem: OT Goals  Goal: LTG - Patient will complete functional transfers with Mod I using least restrictive device  Outcome: Progressing  Goal: LTG - Patient will increase B UE strength and FMC, as evidence by improvements in standardized tests, and increased independence with daily tasks  Outcome: Progressing

## 2024-09-26 NOTE — CARE PLAN
Problem: Bathing  Goal: LTG - Patient will utilize adaptive techniques to bathe body with Mod I  Outcome: Progressing     Problem: Dressings Lower Extremities  Goal: LTG - Patient will dress lower body with Mod I using AE as needed  Outcome: Progressing     Problem: Dressing Upper Extremities  Goal: LTG - Patient will complete upper body dressing with [place]  Outcome: Progressing     Problem: Grooming  Goal: LTG - Patient will complete daily grooming tasks with Mod I  Outcome: Progressing     Problem: Instrumental Activities of Daily Living  Goal: LTG - Patient will independently complete basic home making activities, using least restrictive device, to return to independent functioning at home  Outcome: Progressing  Goal: LTG - Patient will complete simple meal preparation activities with Mod I using least restrictive device  Outcome: Progressing     Problem: Functional Mobility  Goal: LTG - Patient will demonstrate safe kitchen mobility with Mod I using least restrictive device  Outcome: Progressing     Problem: Toileting  Goal: LTG - Patient will complete daily toileting tasks with Mod I  Outcome: Progressing     Problem: OT Transfers  Goal: LTG - Patient will transfer to car with Mod I using least restrictive device  Outcome: Progressing  Goal: LTG - Patient will transfer to tub/shower with Mod I using least restrictive device  Outcome: Progressing     Problem: OT Goals  Goal: LTG - Patient will complete functional transfers with Mod I using least restrictive device  Outcome: Progressing  Goal: LTG - Patient will increase B UE strength and FMC, as evidence by improvements in standardized tests, and increased independence with daily tasks  Outcome: Progressing

## 2024-09-26 NOTE — NURSING NOTE
Pt. Arrived at 1925 via ambulance. A/O x 4. Performed skin check with Janna Lockhart RN. Skin worm ,dry and intact. Multiple discoloration to left upper extremity. Pt. denied pain/discomfort. Pt. Has  2 bottles of medication that she brought in from  home. Ondansetron 8 mg (82 tabs)  and the other carrillo with multiple different pills (56). both bottles  placed in the patient BIN. Pt. Is on RA. Pt. Is on IV Meropenem 1 g q 12 hours for UTI. Will initiate 1st dose of ATB  on 9/26/24 at 0200.Safety measures remain in place. Will cont. to monitor and update as needed throughout shift.

## 2024-09-26 NOTE — INDIVIDUALIZED OVERALL PLAN OF CARE NOTE
Physical Medicine and Rehabilitation  Individualized Overall Plan of Care    Patient Name: Lisbeth Cruz  Medical Record Number: [id number]  YOB: 1960  Sex: Female  Payor Info: Payor: MEDICARE / Plan: MEDICARE PART A AND B / Product Type: *No Product type* /     Primary Rehab (Etiologic) Diagnosis: Ischemic stroke (Multi)   IGC: Stroke: 01.4 No Paresis    Estimated Length of Stay: 7 days    Medical functional prognosis is good for the patient to be discharged home at modified independent} with higher level assist for higher level ADL's.    Patient/Family anticipated outcome: Home as independent as possible.    Functional Outcome/Goal:  Bed mobility: modified independent  Transfer sit to stand: modified independent  Ambulation with: modified independent  Upper extremity dressing: modified independent  Lower extremity dressing: modified independent  Stairs: modified independent  Communicate needs and wants as independent as able  Tolerate least restrictive diet independently     Anticipated Interventions:  Therapeutic exercises  Gait Training   Transfer training  Balance and high-level mobility training  Exercises to improve balance and mobility  ADL retraining for grooming, bathing, ADL activities  Exercises to improve strength and endurance  Cognitive skills and training  Speech and language training  High level executive functioning training  Swallow advancement and training    Required Therapy:  Physical therapy 90 minutes 5 days/week.  Occupational therapy 90 minutes 5 days/week  Speech therapy 45 minutes 5 days/week    Patient is a Excellent rehab candidate.         Yamil Griggs MD  9/26/2024 8:11 AM

## 2024-09-26 NOTE — H&P
[facility]  Admission History and Physical    History of present illness    This is a 64-year-old woman with a history of multiple myeloma and stage IV chronic kidney disease who presented with weakness, seems to be more on the right.  She was found to have right frontal lobe stroke.  There was some question about a history of atrial fibrillation but that could not be confirmed and Plavix was added to the aspirin after consultation with neurology and cardiology.  Zio patch to be placed and follow-up with cardiology.  She is currently on chemotherapy for her multiple myeloma.  She is up and ambulatory, using the walker and improving rapidly.  She tells me she cannot tell which side is weaker the right or the left.  Denies chest pain shortness of breath.  Appetite is good and the bowels are moving.  Anxious to begin a rehabilitation program.  Also found incidentally with urinary tract infection, ESBL organism and is on meropenem through October 1, 2024.    Pre-admission functional status: Independent in ADLs and IADLs    Support System and Family Circumstances: Family available to assist    Past Medical History:   Diagnosis Date    Anxiety 09/21/2024    History of gastric bypass 02/24/2022    Remote history (1988) but has had chronic nausea since      Insomnia 09/21/2024    Major depressive disorder 09/21/2024    Multiple myeloma not having achieved remission (Multi) 09/21/2024    Other amyloidosis (Multi) 09/21/2024    Primary hypertension 09/21/2024    Renal mass     Stage 4 chronic kidney disease (Multi) 09/21/2024        Past Surgical History:   Procedure Laterality Date    CHOLECYSTECTOMY      HIP SURGERY Right     HYSTERECTOMY      IR CVC PICC  07/10/2023    IR CVC PICC        Family History   Problem Relation Name Age of Onset    Other (old age) Mother  92    Other (HF) Father  85       Social History     Socioeconomic History    Marital status:       Spouse name: Not on file    Number of children: Not on file    Years of education: Not on file    Highest education level: Not on file   Occupational History    Not on file   Tobacco Use    Smoking status: Never    Smokeless tobacco: Never   Substance and Sexual Activity    Alcohol use: Never    Drug use: Never    Sexual activity: Not Currently   Other Topics Concern    Not on file   Social History Narrative    Not on file     Social Determinants of Health     Financial Resource Strain: Low Risk  (9/25/2024)    Overall Financial Resource Strain (CARDIA)     Difficulty of Paying Living Expenses: Not hard at all   Food Insecurity: No Food Insecurity (9/25/2024)    Hunger Vital Sign     Worried About Running Out of Food in the Last Year: Never true     Ran Out of Food in the Last Year: Never true   Transportation Needs: No Transportation Needs (9/25/2024)    PRAPARE - Transportation     Lack of Transportation (Medical): No     Lack of Transportation (Non-Medical): No   Physical Activity: Inactive (2/25/2024)    Received from [facility]    Exercise Vital Sign     Days of Exercise per Week: 0 days     Minutes of Exercise per Session: 0 min   Stress: Stress Concern Present (2/25/2024)    Received from [facility]    [employer] [place] [employer] - Occupational Stress Questionnaire     Feeling of Stress : To some extent   Social Connections: Moderately Isolated (2/25/2024)    Received from [facility]    Social Connection and Isolation Panel [NHANES]     Frequency of Communication with Friends and Family: More than three times a week     Frequency of Social Gatherings with Friends and Family: More than three times a week     Attends Druze Services: More than 4 times per year     Active Member of Clubs or Organizations: No     Attends Club or Organization Meetings: Never     Marital Status:    Intimate Partner Violence: Not At Risk (9/25/2024)    Humiliation, Afraid, Rape, and Kick  "questionnaire     Fear of Current or Ex-Partner: No     Emotionally Abused: No     Physically Abused: No     Sexually Abused: No   Housing Stability: Low Risk  (9/25/2024)    Housing Stability Vital Sign     Unable to Pay for Housing in the Last Year: No     Number of Times Moved in the Last Year: 0     Homeless in the Last Year: No       Review of Systems   Constitutional:  Negative for appetite change, chills and fever.   Respiratory:  Negative for cough and shortness of breath.    Cardiovascular:  Negative for chest pain.   Gastrointestinal:  Negative for abdominal pain, diarrhea, nausea and vomiting.   Neurological:  Negative for headaches.   All other systems reviewed and are negative.       Objective   /74 (BP Location: Right arm, Patient Position: Lying)   Pulse 72   Temp 36.6 °C (97.9 °F) (Temporal)   Resp 16   Ht 1.55 m (5' 1.02\")   Wt 60.5 kg (133 lb 6.4 oz)   SpO2 98%   BMI 25.19 kg/m²     Physical Exam  Vitals reviewed.   Constitutional:       General: She is not in acute distress.     Appearance: She is not toxic-appearing.   HENT:      Head: Normocephalic and atraumatic.      Mouth/Throat:      Mouth: Mucous membranes are moist.   Eyes:      Pupils: Pupils are equal, round, and reactive to light.   Cardiovascular:      Rate and Rhythm: Normal rate and regular rhythm.      Heart sounds: No murmur heard.  Pulmonary:      Breath sounds: Normal breath sounds. No wheezing, rhonchi or rales.   Abdominal:      General: There is no distension.      Palpations: Abdomen is soft.   Musculoskeletal:      Right lower leg: No edema.      Left lower leg: No edema.   Neurological:      General: No focal deficit present.      Mental Status: She is alert and oriented to person, place, and time.     Mild diffuse weakness in both upper and lower extremities but nonfocal on my exam.    The right chest port is intact and functional.    Recent Lab Results:  Results for orders placed or performed during the " hospital encounter of 09/25/24 (from the past 24 hour(s))   CBC   Result Value Ref Range    WBC 4.3 (L) 4.4 - 11.3 x10*3/uL    nRBC 0.0 0.0 - 0.0 /100 WBCs    RBC 3.05 (L) 4.00 - 5.20 x10*6/uL    Hemoglobin 9.8 (L) 12.0 - 16.0 g/dL    Hematocrit 30.5 (L) 36.0 - 46.0 %     80 - 100 fL    MCH 32.1 26.0 - 34.0 pg    MCHC 32.1 32.0 - 36.0 g/dL    RDW 16.8 (H) 11.5 - 14.5 %    Platelets 105 (L) 150 - 450 x10*3/uL   Basic Metabolic Panel   Result Value Ref Range    Glucose 63 (L) 74 - 99 mg/dL    Sodium 140 136 - 145 mmol/L    Potassium 3.5 3.5 - 5.3 mmol/L    Chloride 110 (H) 98 - 107 mmol/L    Bicarbonate 20 (L) 21 - 32 mmol/L    Anion Gap 14 10 - 20 mmol/L    Urea Nitrogen 16 6 - 23 mg/dL    Creatinine 1.50 (H) 0.50 - 1.05 mg/dL    eGFR 38 (L) >60 mL/min/1.73m*2    Calcium 7.9 (L) 8.6 - 10.3 mg/dL        Imaging Results:  MR cervical spine wo IV contrast    Result Date: 9/24/2024  Interpreted By:  Justin Prasad, STUDY: MR CERVICAL SPINE WO IV CONTRAST;  9/23/2024 9:55 pm   INDICATION: Signs/Symptoms:R sided weakness not explained by stroke on MRI.   COMPARISON: CTA head and neck dated 09/21/2024. MRI brain dated 09/22/2024.   ACCESSION NUMBER(S): [id number]   ORDERING CLINICIAN: YAJAIRA KENNEY   TECHNIQUE: Multiplanar multisequence noncontrast MR imaging was performed through the cervical spine. Noncontrast sagittal T1, T2, STIR, axial T1 and axial T2 weighted images were acquired through the cervical spine.   FINDINGS: Cord: Normal in caliber and signal.   Epidural fluid: None.   Alignment: 1-2 mm anterolisthesis of C3 on C4. Trace retrolisthesis of C4 on C5. Alignment is otherwise maintained.   Vertebral bodies: Cervical vertebral body heights are grossly preserved.   Marrow signal: Mild type 1 Modic endplate signal change at C4-C5. No suspicious STIR hyperintensity/marrow edema within the cervical spine.   Intervertebral Discs: Moderate degenerative disc height loss at C4-C5 and C5-C6. Mild-to-moderate  degenerative disc height loss at C3-C4. Multilevel disc desiccation.     Degenerative change:   C1-C2:  Mild arthrosis centered about the dens. No spinal canal stenosis.   C2-C3:  Moderate left facet arthropathy. No spinal canal stenosis. No right and mild left neural foraminal narrowing.   C3-C4:  Moderate left facet arthropathy. Mild disc uncovering. Right-greater-than-left uncovertebral spurring. Ventral thecal sac indentation without spinal canal stenosis. Moderate left and mild right neural foraminal narrowing suggested.   C4-C5:  Mild left facet arthropathy. Mild ligamentum flavum thickening/infolding. Bilateral uncovertebral spurring. Mild disc bulge and hypertrophic endplate spurring. Mild-to-moderate spinal canal stenosis. Severe right and moderate left neural foraminal narrowing suggested.   C5-C6:  Mild left facet arthropathy. Mild ligamentum flavum thickening/infolding. Right-greater-than-left uncovertebral spurring. Mild-to-moderate disc bulge. Mild spinal canal stenosis. Severe right and no substantial left neural foraminal narrowing.   C6-C7:  Unremarkable.   C7-T1:  Mild bilateral facet arthropathy. No spinal canal stenosis or neural foraminal narrowing.   Soft tissues: The prevertebral and posterior paraspinous soft tissues are within normal limits.       Cervical spondylosis, worst at C4-C5 and C5-C6. There is mild-to-moderate spinal canal stenosis at C4-C5 with severe right/moderate left neural foraminal narrowing. There is mild spinal canal stenosis at C5-C6 with severe right neural foraminal narrowing.   MACRO: None   Signed by: Justin Prasad 9/24/2024 1:56 PM Dictation workstation:   [id number]    ECG 12 lead    Result Date: 9/23/2024  Normal sinus rhythm Normal ECG No previous ECGs available Confirmed by Mario Alberto Hammond ([id number]) on 9/23/2024 12:11:39 PM    Transthoracic Echo (TTE) Complete    Result Date: 9/23/2024            [facility] [address], [place] [address]              Phone [phone] TRANSTHORACIC ECHOCARDIOGRAM REPORT Patient Name:     BISHNU SANTOS      Reading Physician:   [id number] Enzo Bradley DO Study Date:       9/23/2024            Ordering Provider:   [id number] SANDRA VERNON MRN/PID:          [id number]             Fellow: Accession#:       [id number]         Nurse: Date of           1960 / 64 years Sonographer:         Monika Ramos RDCS Birth/Age: Gender:           F                    Additional Staff: Height:           154.94 cm            Admit Date: Weight:           68.95 kg             Admission Status:    Inpatient - Routine BSA / BMI:        1.68 m2 / 28.72      Department Location: [facility]                   kg/m2 Blood Pressure: 124 /50 mmHg Study Type:    TRANSTHORACIC ECHO (TTE) COMPLETE Diagnosis/ICD: Other amyloidosis-E85.8 CPT Codes:     Echo Complete w Full Doppler-65099 Patient History: Pertinent History: HTN, cardiomyopathy, CKD, CHF. Study Detail: The following Echo studies were performed: 2D, M-Mode, Doppler and               color flow. Technically challenging study due to body habitus.               Agitated saline used as a contrast agent for intraseptal flow               evaluation.  PHYSICIAN INTERPRETATION: Left Ventricle: Left ventricular ejection fraction is normal, by visual estimate at 60-65%. There are no regional wall motion abnormalities. The left ventricular cavity size is normal. Spectral Doppler shows an impaired relaxation pattern of left ventricular diastolic filling. Left Atrium: The left atrium is mildly dilated. A bubble study using agitated saline was performed. Right Ventricle: The right ventricle is normal in size. There is normal right ventricular global systolic function. Right Atrium: The right atrium is normal in size. Aortic Valve: The aortic valve appears structurally normal. The aortic valve dimensionless index is 0.80. There is no evidence  of aortic valve regurgitation. The peak instantaneous gradient of the aortic valve is 11.2 mmHg. The mean gradient of the aortic valve is 5.0 mmHg. Mitral Valve: The mitral valve is normal in structure. There is no evidence of mitral valve regurgitation. Tricuspid Valve: The tricuspid valve is structurally normal. There is trace to mild tricuspid regurgitation. The Doppler estimated RVSP is within normal limits at 28.4 mmHg. Pulmonic Valve: The pulmonic valve is structurally normal. There is no indication of pulmonic valve regurgitation. Pericardium: No pericardial effusion noted. Aorta: The aortic root is normal.  CONCLUSIONS:  1. Left ventricular ejection fraction is normal, by visual estimate at 60-65%.  2. Spectral Doppler shows an impaired relaxation pattern of left ventricular diastolic filling.  3. There is normal right ventricular global systolic function.  4. Right ventricular within normal limits. QUANTITATIVE DATA SUMMARY:  2D MEASUREMENTS:           Normal Ranges: LAs:             4.20 cm   (2.7-4.0cm) IVSd:            0.65 cm   (0.6-1.1cm) LVPWd:           1.15 cm   (0.6-1.1cm) LVIDd:           4.24 cm   (3.9-5.9cm) LVIDs:           2.25 cm LV Mass Index:   71.8 g/m2 LV % FS          46.9 %  LA VOLUME:                    Normal Ranges: LA Vol A4C:        64.9 ml    (22+/-6mL/m2) LA Vol A2C:        66.9 ml LA Vol BP:         68.9 ml LA Vol Index A4C:  38.6ml/m2 LA Vol Index A2C:  39.8 ml/m2 LA Vol Index BP:   41.0 ml/m2 LA Area A4C:       21.0 cm2 LA Area A2C:       22.3 cm2 LA Major Axis A4C: 5.8 cm LA Major Axis A2C: 6.3 cm LA Volume Index:   38.8 ml/m2 LA Vol A4C:        61.4 ml LA Vol A2C:        63.2 ml LA Vol Index BSA:  37.1 ml/m2  RA VOLUME BY A/L METHOD:            Normal Ranges: RA Vol A4C:              18.8 ml    (8.3-19.5ml) RA Vol Index A4C:        11.2 ml/m2 RA Area A4C:             10.2 cm2 RA Major Axis A4C:       4.7 cm  M-MODE MEASUREMENTS:         Normal Ranges: Ao Root:              2.80 cm (2.0-3.7cm) LAs:                 4.60 cm (2.7-4.0cm)  AORTA MEASUREMENTS:         Normal Ranges: Ao Sinus, d:        2.88 cm (2.1-3.5cm) Ao STJ, d:          2.76 cm (1.7-3.4cm) Asc Ao, d:          2.80 cm (2.1-3.4cm)  LV SYSTOLIC FUNCTION BY 2D PLANIMETRY (MOD):                      Normal Ranges: EF-A4C View:    61 % (>=55%) EF-A2C View:    49 % EF-Biplane:     55 % EF-Visual:      63 % LV EF Reported: 63 %  LV DIASTOLIC FUNCTION:           Normal Ranges: MV Peak E:             0.53 m/s  (0.7-1.2 m/s) MV Peak A:             0.87 m/s  (0.42-0.7 m/s) E/A Ratio:             0.61      (1.0-2.2) MV e'                  0.055 m/s (>8.0) MV lateral e'          0.06 m/s MV medial e'           0.05 m/s E/e' Ratio:            9.58      (<8.0)  MITRAL VALVE:          Normal Ranges: MV DT:        225 msec (150-240msec)  AORTIC VALVE:                      Normal Ranges: AoV Vmax:                1.67 m/s  (<=1.7m/s) AoV Peak P.2 mmHg (<20mmHg) AoV Mean P.0 mmHg  (1.7-11.5mmHg) LVOT Max Kevin:            1.31 m/s  (<=1.1m/s) AoV VTI:                 31.10 cm  (18-25cm) LVOT VTI:                24.80 cm LVOT Diameter:           2.00 cm   (1.8-2.4cm) AoV Area, VTI:           2.51 cm2  (2.5-5.5cm2) AoV Area,Vmax:           2.46 cm2  (2.5-4.5cm2) AoV Dimensionless Index: 0.80  RIGHT VENTRICLE: RV Basal 2.73 cm RV Mid   2.43 cm RV Major 6.2 cm TAPSE:   23.6 mm RV s'    0.07 m/s  TRICUSPID VALVE/RVSP:          Normal Ranges: Peak TR Velocity:     2.52 m/s RV Syst Pressure:     28 mmHg  (< 30mmHg) IVC Diam:             1.85 cm  [id number] Enzo Bradley DO Electronically signed on 2024 at 11:52:08 AM  ** Final **     MR brain wo IV contrast    Result Date: 2024  Interpreted By:  Murphy Tirado, STUDY: MR BRAIN WO IV CONTRAST;  2024 3:00 pm   INDICATION: Signs/Symptoms:Right-sided weakness  and dysarthria.   COMPARISON: None.   ACCESSION NUMBER(S): [id number]   ORDERING CLINICIAN: SANDRA  SAULO   TECHNIQUE: The brain was studied in the sagittal, axial and coronal planes utilizing FLAIR, T1 and T2 weighted images.   FINDINGS: There is a normal-size ventricular system.  There are scattered foci of increased signal in the subcortical and periventricular white matter best appreciated on FLAIR images. These likely represent foci of demyelination of uncertain cause and significance and may be physiologic at this patient's stated age.   There is no evidence of intracranial mass or extra-axial collection. There are 2.5/3 cm retention cysts or polyps in the maxillary sinuses bilaterally. The skull base, paranasal sinuses and orbital structures are otherwise unremarkable.   Diffusion weighted images and associated ADC maps of the brain demonstrate a punctate focus of diffusion restriction in the right frontal lobe white matter on axial diffusion-weighted image 26/40. Finding is consistent with acute/subacute lacunar infarction.. Gradient echo T2 weighted images fail to demonstrate hemosiderin deposition or other evidence of hemorrhage.       *Acute/subacute lacunar infarction in the right frontal lobe white matter measuring a proximally 3 mm in size. No hemorrhage or measurable mass effect *No evidence of intracranial mass, extra-axial collection or cerebral hemorrhage.   MACRO: none   Signed by: Murphy Tirado 9/22/2024 3:46 PM Dictation workstation:   [id number]    CT head wo IV contrast    Result Date: 9/21/2024  Interpreted By:  Blossom Griffith, STUDY: CT HEAD WO IV CONTRAST;  9/21/2024 12:31 pm   INDICATION: Signs/Symptoms:worsening sx on code brain attack.     COMPARISON: CT brain attack performed on the same day at 10:33 a.m..   ACCESSION NUMBER(S): [id number]   ORDERING CLINICIAN: DORENE PALACIO   TECHNIQUE: Noncontrast axial CT scan of head was performed. Angled reformats in brain and bone windows were generated. The images were reviewed in bone, brain, blood and soft tissue windows.   FINDINGS:  Parenchyma and CSF spaces: There is mild-to-moderate diffuse cerebral volume loss with widening of the sulci and ex vacuo dilatation of ventricles. There is diffuse periventricular white matter hypoattenuation in bilateral cerebral hemispheres, likely favored to be related to chronic white matter ischemic changes. There is interval increase in density within the falx cerebri and increased density in the cerebral vasculature, likely favored to be related to recently received intravenous contrast for immediate prior CT angiogram. Evaluation for subtle intracranial hemorrhage is limited secondary to background enhancement. Within this limitation, there is no evidence of intracranial hemorrhage or extra-axial fluid collection. No large territorial loss of gray-white differentiation is noted. Basal cisterns are patent. No evidence of mass effect or midline shift.   Calvarium: The calvarium is unremarkable.   Paranasal sinuses and mastoids: There is partial opacification/mucosal thickening in the included bilateral maxillary sinuses. Otherwise rest of the paranasal sinuses and mastoid air cells are clear.       1. Interval evidence of hyperdensity in the falx cerebri and in the intracranial vasculature as described above is most likely favored to be related to recent intravenous contrast administered during the CT angiogram head performed on the same day. Within this limitation, there is no evidence of intracranial hemorrhage or mass effect or midline shift. 2. No definite CT evidence of territorial loss of gray-white differentiation to suggest acute infarction. However an MRI can be considered for definitive evaluation.     MACRO: None     Signed by: Blossom Griffith 9/21/2024 12:50 PM Dictation workstation:   [id number]    XR chest 1 view    Result Date: 9/21/2024  Interpreted By:  Eric Tiwari, STUDY: XR CHEST 1 VIEW;  9/21/2024 11:23 am   INDICATION: Signs/Symptoms:brain attack.     COMPARISON: None.   ACCESSION  NUMBER(S): [id number]   ORDERING CLINICIAN: DORENE PALACIO   FINDINGS: Right port catheter in place.   CARDIOMEDIASTINAL SILHOUETTE: Cardiomediastinal silhouette is normal in size and configuration. There is pulmonary vascular congestion.   LUNGS: There is no consolidation. There is no effusion.   ABDOMEN: No remarkable upper abdominal findings.   BONES: No acute osseous changes.       1. Mild pulmonary vascular congestion without acute abnormality       MACRO: None   Signed by: Eric Tiwari 9/21/2024 11:36 AM Dictation workstation:   [id number]    CT angio head and neck w and wo IV contrast    Result Date: 9/21/2024  Interpreted By:  Murphy Tirado, STUDY: CT ANGIO HEAD AND NECK W AND WO IV CONTRAST;  9/21/2024 10:54 am   INDICATION: Signs/Symptoms:Brain attack, right sided weakness.   COMPARISON: None.   ACCESSION NUMBER(S): [id number]   ORDERING CLINICIAN: DORENE PALCAIO   TECHNIQUE: Following intravenous injection of contrast material, CT was acquired from the aortic arch to the vertex of the skull. Multiplanar reconstructions and 3D reconstructions were made.3D reconstructions, MIPs, Volume Rendered, or Surface Shading image were performed at a separate workstation   FINDINGS: Chest   The aortic arch and origin of great vessels are normal. The visualized mediastinum and pulmonary apices are normal.   Neck   Right carotid The common carotid artery is normal. The bifurcation is normal. The cervical, petrous and cavernous portions are normal   Left carotid The common carotid artery is normal. The bifurcation is normal. The cervical, petrous and cavernous portions are normal.   Vertebrals   The vertebral arteries are symmetrical. Their origin is are normal. No evidence of compression or filling defect in the cervical portions. The transverse intradural portions are normal. The vertebrobasilar junction and basilar artery are normal.   Soft tissue neck     There is no evidence of mass, cyst or adenopathy within  the neck. There are mild/moderate bony productive and degenerative changes especially at C4/C5 without bony canal stenosis or bony foraminal narrowing.   Intracranial   There is no evidence of aneurysm, vascular malformation or branch occlusion.       * Normal exam   MACRO: Murphy Tirado discussed the significance and urgency of this critical finding by telephone with  DORENE PALACIO on 9/21/2024 at 11:18 am.  (**-RCF-**) Findings:  See findings.     Signed by: Murphy Tirado 9/21/2024 11:18 AM Dictation workstation:   [id number]    CT brain attack head wo IV contrast    Result Date: 9/21/2024  Interpreted By:  Reynaldo Crowley, STUDY: CT BRAIN ATTACK HEAD WO IV CONTRAST; ;  9/21/2024 10:33 am   INDICATION: Signs/Symptoms:Right-sided weakness; brain attack.     COMPARISON: None.   ACCESSION NUMBER(S): [id number]   ORDERING CLINICIAN: DORENE PALACIO   TECHNIQUE: Serial axial unenhanced CT images obtained of the head with images reformatted in the coronal and sagittal projection.   All CT examinations are performed with 1 or more of the following dose reduction techniques: Automated exposure control, adjustment of mA and/or kv according to patient's size, or use of iterative reconstruction techniques.   FINDINGS: Moderate parenchymal volume loss with prominence of the ventricles, sulci, and cisterns. Gray-white matter differentiation maintained. There is a remote lacunar infarct in the right caudate head. Also, remote lacunar infarct within the left thalamus about the internal capsule. No intra-axial or extra-axial blood or fluid collections are identified.   Visualized osseous structures demonstrate findings suggesting retention cysts within bilateral maxillary sinuses for example in the right maxillary sinus measuring 2.6 cm. Paranasal sinuses and mastoid air cells are unremarkable.               1. No acute intracranial abnormality   2. Remote lacunar infarcts in the right caudate head and left thalamus.      MACRO: Reynaldo Crowley discussed the significance and urgency of this critical finding by telephone with  DORENE PALACIO on 9/21/2024 at 10:57 am.  (**-RCF-**) Findings:  See findings.     Signed by: Reynaldo Crowley 9/21/2024 10:57 AM Dictation workstation:   [id number]       Medications:  Scheduled medications  acyclovir, 200 mg, oral, BID  aspirin, 81 mg, oral, Daily  atorvastatin, 40 mg, oral, Daily  buPROPion SR, 200 mg, oral, Daily  calcium carbonate, 250 mg, oral, Daily  cholecalciferol, 2,000 Units, oral, Daily  clopidogrel, 75 mg, oral, Daily  cyanocobalamin, 1,000 mcg, oral, Daily  meropenem (Merrem) 1 g IV, 1 g, intravenous, q12h  OLANZapine, 2.5 mg, oral, Nightly  sertraline, 100 mg, oral, Daily  sodium bicarbonate, 650 mg, oral, BID  traZODone, 100 mg, oral, Nightly      Continuous medications     PRN medications  PRN medications: acetaminophen **OR** acetaminophen, alum-mag hydroxide-simeth, bisacodyl, hydrALAZINE, polyethylene glycol    Assessment/Plan   Admitted for comprehensive inpatient rehabilitation including PT, OT, RT, SLP, and supportive services to improve functional outcome of impaired mobility, ADLs, transfers, and self-care.     Problem   Ischemic Stroke (Multi)   Multiple Myeloma Not Having Achieved Remission (Multi)   Stage 4 Chronic Kidney Disease (Multi)      Continue with medical management for secondary prevention: DAPT x 21 days  DVT prophylaxis has been ordered: mechanical  Lifestyle interventions for stroke prevention will also be emphasized.  A falls prevention strategy will be employed.  The importance of follow up with the neurologist will be emphasized.    Multiple myeloma on chemotherapy.    Stage IV chronic kidney disease, monitor renal function and electrolytes.  The serum creatinine has been improving fairly steadily over the past several days.  GFR is now 38.  Continue sodium bicarbonate.    Pancytopenia.    Depression and anxiety, continue home medications.      Rehab  Plan of Care :  The Interdisciplinary Team will work collaboratively to address the patient problems and goals to be managed by the Individualized Interdisciplinary Plan of Care. See the IPOC note for a complete review of the plan of care.    Medical Necessity:  This patient requires, and is capable of participating in, an intensive and coordinated interdisciplinary acute inpatient rehabilitation program. He requires close rehabilitation physician monitoring and management to monitor his complex medical conditions and coordinate rehabilitation care. The patient's rehabilitation goals and medical complexity cannot adequately be managed in a less intensive setting. Potential risks for clinical complications include: Worsening renal function, venous thromboembolism, falls.    Rehabilitation Potential: excellent    Yamil Griggs MD

## 2024-09-26 NOTE — PROGRESS NOTES
Physical Therapy Treatment Note        09/26/24 0738-1415   PT  Visit   PT Received On 09/26/24   Response to Previous Treatment Patient with no complaints from previous session.   General   Reason for Referral Patient is a 64 year old female who presented to the ED with c/o Left sided weakness and stroke like symptoms. MRI of the brain completed indicating: Acute/subacute lacunar infarction in the right frontal lobe white  matter measuring a proximally 3 mm in size. No hemorrhage or  measurable mass effect *No evidence of intracranial mass, extra-axial  collection or cerebral hemorrhage.Patient also found to have UTI.   Referred By Dr. Griggs   Past Medical History Relevant to Rehab anxiety, cardiomyopathy, HTN, dyspnea, chronic diastolic heart failure   General Comment Lunch was okay.  Pt given alternate menu   Precautions   Medical Precautions Fall precautions   Balance/Neuromuscular Re-Education   Balance/Neuromuscular Re-Education Activity 1 Blue therpads WBOS alternating BUE CGx1   Balance/Neuromuscular Re-Education Activity 2 Small  Hurdles ll bars No UEs CGx1 some increased difficulty with LLE clearance, required brief steadying with UEs ll bars   Balance/Neuromuscular Re-Education Activity 3 Coordination LAdder ll bars No Ue support Cgx1 cues to slow pace for challenge, light UE support intermittent   Ambulation/Gait Training 1   Surface 1 Level tile   Device 1 No device   Assistance 1 Contact guard   Comments/Distance (ft) 1 75 feet x3 Pt reports increased Limp.  Slow cautious pace.   Transfer 1   Transfer From 1 Wheelchair to   Transfer to 1 Stand   Technique 1 Sit to stand;Stand to sit   Transfer Device 1 Walker   Transfer Level of Assistance 1 Close supervision;Contact guard   Transfers 2   Technique 2 Stand pivot   Transfer Level of Assistance 2 Contact guard   Trials/Comments 2 No AD   Stairs   Stairs Yes   Stairs 2   Rails 2 Bilateral   Device 2 No device   Support Devices 2 Gait belt   Assistance 2  Contact guard   Comment/Number of Steps 2 12 steps 2 RAils step to pattern.  C/O Left hip discomfort.   Wheelchair Activities   Propulsion Type 1 Manual   Level 1 Level tile   Method 1 Right upper extremity;Left upper extremity;Right lower extremity;Left lower extremity   Level of Assistance 1 Distant supervision   Description/Details 1 125 feet   Activity Tolerance   Rate of Perceived Exertion (RPE) 5/10 after standing tasks   PT Assessment   End of Session Patient Position Up in chair;Alarm off, not on at start of session   Outpatient Education   Education Comment Educated on Neuroplasticity and  increasing challenge for Optimal recovery.   PT Plan   Inpatient/Swing Bed or Outpatient Inpatient   PT Plan   PT Recommended Transfer Status Contact guard   PT - OK to Discharge Yes

## 2024-09-26 NOTE — NURSING NOTE
Collected blood sample for CBC and BMP via IR CVC PICC. PICC  line flashing well.  Administered Meropenem 1g via PICC without difficulty. No s/s of adverse reaction to med.

## 2024-09-27 PROCEDURE — 97116 GAIT TRAINING THERAPY: CPT | Mod: GP,CQ

## 2024-09-27 PROCEDURE — 2500000002 HC RX 250 W HCPCS SELF ADMINISTERED DRUGS (ALT 637 FOR MEDICARE OP, ALT 636 FOR OP/ED): Performed by: INTERNAL MEDICINE

## 2024-09-27 PROCEDURE — 97110 THERAPEUTIC EXERCISES: CPT | Mod: GO

## 2024-09-27 PROCEDURE — 97530 THERAPEUTIC ACTIVITIES: CPT | Mod: GP,CQ

## 2024-09-27 PROCEDURE — 97530 THERAPEUTIC ACTIVITIES: CPT | Mod: GO

## 2024-09-27 PROCEDURE — 97110 THERAPEUTIC EXERCISES: CPT | Mod: GP,CQ

## 2024-09-27 PROCEDURE — 97535 SELF CARE MNGMENT TRAINING: CPT | Mod: GO

## 2024-09-27 PROCEDURE — 2500000001 HC RX 250 WO HCPCS SELF ADMINISTERED DRUGS (ALT 637 FOR MEDICARE OP): Performed by: INTERNAL MEDICINE

## 2024-09-27 PROCEDURE — 97112 NEUROMUSCULAR REEDUCATION: CPT | Mod: GP,CQ

## 2024-09-27 PROCEDURE — 1180000001 HC REHAB PRIVATE ROOM DAILY

## 2024-09-27 PROCEDURE — 2500000004 HC RX 250 GENERAL PHARMACY W/ HCPCS (ALT 636 FOR OP/ED): Performed by: INTERNAL MEDICINE

## 2024-09-27 PROCEDURE — 99232 SBSQ HOSP IP/OBS MODERATE 35: CPT | Performed by: PHYSICIAN ASSISTANT

## 2024-09-27 ASSESSMENT — COGNITIVE AND FUNCTIONAL STATUS - GENERAL
PERSONAL GROOMING: A LITTLE
DRESSING REGULAR LOWER BODY CLOTHING: A LITTLE
HELP NEEDED FOR BATHING: A LITTLE
DAILY ACTIVITIY SCORE: 18
EATING MEALS: A LITTLE
TOILETING: A LITTLE
DRESSING REGULAR UPPER BODY CLOTHING: A LITTLE

## 2024-09-27 ASSESSMENT — PAIN - FUNCTIONAL ASSESSMENT
PAIN_FUNCTIONAL_ASSESSMENT: 0-10

## 2024-09-27 ASSESSMENT — PAIN SCALES - GENERAL
PAINLEVEL_OUTOF10: 0 - NO PAIN
PAINLEVEL_OUTOF10: 3

## 2024-09-27 ASSESSMENT — ACTIVITIES OF DAILY LIVING (ADL)
HOME_MANAGEMENT_TIME_ENTRY: 45
BATHING_LEVEL_OF_ASSISTANCE: CONTACT GUARD
BATHING_WHERE_ASSESSED: SHOWER

## 2024-09-27 NOTE — CARE PLAN
The patient's goals for the shift include improve mobility    The clinical goals for the shift include maintain safety    Over the shift, the patient did not make progress toward the following goals. Barriers to progression include Patient's understanding and wiliness. Recommendations to address these barriers include Patient education.

## 2024-09-27 NOTE — PROGRESS NOTES
Occupational Therapy Treatment     09/27/24 0900   OT Last Visit   OT Received On 09/27/24   General   Reason for Referral Patient is a 64 year old female who presented to the ED with c/o Left sided weakness and stroke like symptoms. MRI of the brain completed indicating: Acute/subacute lacunar infarction in the right frontal lobe white  matter measuring a proximally 3 mm in size. No hemorrhage or  measurable mass effect *No evidence of intracranial mass, extra-axial  collection or cerebral hemorrhage.Patient also found to have UTI.   Referred By Dr. Griggs   Past Medical History Relevant to Rehab anxiety, cardiomyopathy, HTN, dyspnea, chronic diastolic heart failure   Family/Caregiver Present No   Prior to Session Communication Bedside nurse   Patient Position Received Up in chair;Alarm off, not on at start of session   Preferred Learning Style verbal;visual   General Comment Cleared by nsg, pt met in PT gym, seated supported in w/c, agreeable to OT session   Precautions   Hearing/Visual Limitations glasses, hearing WFL   Medical Precautions Fall precautions   Pain Assessment   Pain Assessment 0-10   0-10 (Numeric) Pain Score 3   Pain Type Acute pain   Pain Location Back   Pain Interventions Repositioned  (reported very mild back pain after prolonged standing)   Cognition   Overall Cognitive Status WFL   Orientation Level Oriented X4   Cognition Comments requires cues intermittently for sequencing.  soft spoken   Grooming   Grooming Level of Assistance Setup   Grooming Where Assessed Wheelchair;Sitting sinkside   Grooming Comments items placed within reach while seated supported in wc she completes oral care face washing and hair combing   UE Bathing   UE Bathing Level of Assistance Close supervision   UE Bathing Where Assessed Shower   UE Bathing Comments supervision for safety and sequencing with intermittent cues for advancement of activity   LE Bathing   LE Bathing Level of Assistance Contact guard   LE Bathing  Where Assessed Shower   LE Bathing Comments CGA in dynamic standing phase while bathing buttocks and kaitlin area   UE Dressing   UE Dressing Level of Assistance Minimum assistance   UE Dressing Where Assessed Wheelchair   UE Dressing Comments min A for proper orientation to pull over dress   LE Dressing   LE Dressing Yes   Sock Level of Assistance Setup   Shoe Level of Assistance Minimum assistance   LE Dressing Where Assessed Wheelchair   LE Dressing Comments pt donns socks with figure 4 technique while seated supported in wc, requires min A  to fede slip on shoes   Toileting   Toileting Level of Assistance Close supervision   Where Assessed Toilet   Toileting Comments cues on sequencing and safety techniques, manages kaitlin area hygiene acts with lateral weight shifting while seated   Functional Standing Tolerance   Time ~11 minutes   Activity standing table top activity   Functional Standing Tolerance Comments good standing balance   Bed Mobility   Bed Mobility No   Functional Mobility   Functional Mobility Performed No   Transfers   Transfer Yes   Transfer 1   Transfer From 1 Wheelchair to   Transfer to 1 Sit;Stand   Technique 1 Sit to stand;Stand to sit   Transfer Level of Assistance 1 Close supervision   Trials/Comments 1 cues on hand placement, w/c safety techniques, use of grab bars   Toilet Transfers   Toilet Transfer From Wheelchair   Toilet Transfer Type To and from   Toilet Transfer to Standard toilet   Toilet Transfer Technique Stand pivot   Toilet Transfers Supervision   Toilet Transfers Comments cues on body positioning and hand placement on grab bar prior to descent   Shower Transfers   Shower Transfer From Wheelchair   Shower Transfer Type To and from   Shower Transfer to Shower seat with back   Shower Transfer Technique Stand pivot   Shower Transfers Contact guard   Shower Transfers Comments cues for step sequencing, safety, hand placement   Therapeutic Exercise   Therapeutic Exercise Performed Yes    Therapeutic Exercise Activity 1 UE arm bike x 10 minutes on level 1; 1/2 time in fwd direction, 1/2 time in backwards direction. seated rest break b/w sets   Therapeutic Activity   Therapeutic Activity Performed Yes   Therapeutic Activity 1 standing table top activity (pattern recreation) with pt challenged to reach across midline and slightly beyond SUKHI to challenge her dynamic standing balance and tolerance in preparation for ADLs. tolerates 10 mins and 50s in stance, completes activity with 100% accuracy   Therapeutic Activity 2 BITs bell cancellation completed in 3 minutes 47s with 2 distractors and 0 misses   Therapeutic Activity 3 BITs puzzle chart, 7 mins 32s to complete 10 trials, 80% accuracy, 8.37s reaction time   IP OT Assessment   OT Assessment Continues to progress towards OT POC   Prognosis Excellent   Barriers to Discharge None   Evaluation/Treatment Tolerance Patient tolerated treatment well   Medical Staff Made Aware Yes   End of Session Communication Bedside nurse   End of Session Patient Position Up in chair   OT Assessment   OT Assessment Results Decreased ADL status;Decreased upper extremity strength;Decreased safe judgment during ADL;Decreased endurance;Decreased functional mobility;Decreased gross motor control;Decreased IADLs   Strengths Physical health;Premorbid level of function   Barriers to Participation Comorbidities   Education   Individual(s) Educated Patient   Education Provided Fall precautons;Risk and benefits of OT discussed with patient or other;POC discussed and agreed upon   Risk and Benefits Discussed with Patient/Caregiver/Other yes   Patient/Caregiver Demonstrated Understanding yes   Plan of Care Discussed and Agreed Upon yes   Patient Response to Education Patient/Caregiver Verbalized Understanding of Information   Inpatient/Swing Bed or Outpatient   Inpatient/Swing Bed or Outpatient Inpatient   Inpatient Plan   Treatment Interventions ADL retraining;Functional transfer  training;UE strengthening/ROM;Endurance training;Cognitive reorientation;Patient/family training;Neuromuscular reeducation;Compensatory technique education;Fine motor coordination activities;Equipment evaluation/education   OT Frequency 5 times per week   OT Discharge Recommendations High intensity level of continued care   Equipment Recommended upon Discharge Wheeled walker   OT Recommended Transfer Status Assist of 1   OT - OK to Discharge Yes      09/27/24 0900   [facility] Daily Activity   Putting on and taking off regular lower body clothing 3   Bathing (including washing, rinsing, drying) 3   Putting on and taking off regular upper body clothing 3   Toileting, which includes using toilet, bedpan or urinal 3   Taking care of personal grooming such as brushing teeth 3   Eating Meals 3   Daily Activity - Total Score 18     Problem: Bathing  Goal: LTG - Patient will utilize adaptive techniques to bathe body with Mod I  Outcome: Progressing     Problem: Dressings Lower Extremities  Goal: LTG - Patient will dress lower body with Mod I using AE as needed  Outcome: Progressing     Problem: Dressing Upper Extremities  Goal: LTG - Patient will complete upper body dressing with Moffat  Outcome: Progressing     Problem: Grooming  Goal: LTG - Patient will complete daily grooming tasks with Mod I  Outcome: Progressing     Problem: Instrumental Activities of Daily Living  Goal: LTG - Patient will independently complete basic home making activities, using least restrictive device, to return to independent functioning at home  Outcome: Progressing  Goal: LTG - Patient will complete simple meal preparation activities with Mod I using least restrictive device  Outcome: Progressing     Problem: Toileting  Goal: LTG - Patient will complete daily toileting tasks with Mod I  Outcome: Progressing     Problem: OT Transfers  Goal: LTG - Patient will transfer to car with Mod I using least restrictive device  Outcome: Progressing  Goal:  LTG - Patient will transfer to tub/shower with Mod I using least restrictive device  Outcome: Progressing  Goal: Goal 1  Outcome: Progressing     Problem: OT Goals  Goal: LTG - Patient will complete functional transfers with Mod I using least restrictive device  Outcome: Progressing  Goal: LTG - Patient will increase B UE strength and FMC, as evidence by improvements in standardized tests, and increased independence with daily tasks  Outcome: Progressing

## 2024-09-27 NOTE — PROGRESS NOTES
"Physical Therapy       09/27/24 [id number]   PT  Visit   PT Received On 09/27/24   General   Reason for Referral Patient is a 64 year old female who presented to the ED with c/o Left sided weakness and stroke like symptoms. MRI of the brain completed indicating: Acute/subacute lacunar infarction in the right frontal lobe white  matter measuring a proximally 3 mm in size. No hemorrhage or  measurable mass effect *No evidence of intracranial mass, extra-axial  collection or cerebral hemorrhage.Patient also found to have UTI.   Referred By Dr. Griggs   Past Medical History Relevant to Rehab anxiety, cardiomyopathy, HTN, dyspnea, chronic diastolic heart failure   Preferred Learning Style verbal;visual   General Comment Pt reported sleeping well.  Agreeable to tx   Precautions   Medical Precautions Fall precautions   Precautions Comment pt reported feeling a little nauseated   Pain Assessment   Pain Assessment 0-10   0-10 (Numeric) Pain Score 0 - No pain   Therapeutic Exercise   Therapeutic Exercise Activity 1 seated hip abduction, 2x15 reps, green theraband   Therapeutic Exercise Activity 2 seated isometric hip adduction/bal squeezes, 2x15 reps   Therapeutic Activity   Therapeutic Activity 1 omnicycle x 10 minutes, level 1 resistance, 1.6 km, 98% activity   Therapeutic Activity 2 sit/stands, no UE assist.  1x10 reps, Valentin   Therapeutic Activity 3 stacked cone pick-up from floor, no AD, CG/Valentin, slow/effortful.  Vc's for correct squat technique with good carryover noted.   Ambulation/Gait Training 1   Surface 1 Level tile   Device 1 No device   Assistance 1 Contact guard   Quality of Gait 1 NBOS;Diminished heel strike;Decreased step length;Shuffling gait;Foot sweep   Comments/Distance (ft) 1 80 ft x 2, turns included.  Vc's to minimize above deficits, with min carryover noted.  Pt reported \"my legs are a little tired\" after amb distance.   Transfer 1   Technique 1 Sit to stand;Stand to sit   Transfer Device 1 Gait belt "   Transfer Level of Assistance 1 Close supervision;Contact guard   Stairs   Rails 1 Bilateral   Device 1 Railing   Support Devices 1 Gait belt   Assistance 1 Close supervision   Comment/Number of Steps 1 up/down 12 6-inch steps, step-to pattern.  Vc's for WBOS and full foot on step.   Wheelchair Activities   Propulsion Type 1 Manual   Level 1 Level tile   Method 1 Right upper extremity;Left upper extremity;Right lower extremity;Left lower extremity   Level of Assistance 1 Set up   Description/Details 1 150 ft, turns included   Activity Tolerance   Endurance Endurance does not limit participation in activity   PT Assessment   PT Assessment Results Decreased strength;Decreased endurance;Impaired balance;Decreased mobility;Impaired judgement;Decreased safety awareness   Rehab Prognosis Good   Assessment Comment Pt put forth good effort in tx this AM.  Pt tolerated increased amb distance and standing tasks well with min reports of fatigue.   End of Session Patient Position Up in chair   PT Plan   Inpatient/Swing Bed or Outpatient Inpatient   PT Plan   Treatment/Interventions Bed mobility;Transfer training;Gait training;Stair training;Neuromuscular re-education;Therapeutic exercise;Therapeutic activity   PT Plan Ongoing PT   Equipment Recommended upon Discharge Wheeled walker   PT Recommended Transfer Status Contact guard

## 2024-09-27 NOTE — PROGRESS NOTES
Care transitions after IDT meeting this morning called and left detailed message for patient daughter Diana informing her of patient progress and possible ADOD of 10/02/2024. Patient progressing very well with therapy at this time. Care transitions met with patient at the bedside and informed patient as well of possible ADOD and at discharge possible HHC set up at home. Care transitions to readdress closer to discharge.

## 2024-09-27 NOTE — NURSING NOTE
Assumed care of patient, after receiving report from outgoing RN.  Patient AAO x 3 on RA, no distress noted, breathing unlabored.  Port-a-cath on right chest, site asymptomatic.  Patient up in the chair for breakfast, chair alarm on.  No complaints at this time, plan of care ongoing.  Call light within reach.

## 2024-09-27 NOTE — PROGRESS NOTES
Physical Therapy       09/27/24 [id number]   PT  Visit   PT Received On 09/27/24   Response to Previous Treatment Patient with no complaints from previous session.   General   Reason for Referral Patient is a 64 year old female who presented to the ED with c/o Left sided weakness and stroke like symptoms. MRI of the brain completed indicating: Acute/subacute lacunar infarction in the right frontal lobe white  matter measuring a proximally 3 mm in size. No hemorrhage or  measurable mass effect *No evidence of intracranial mass, extra-axial  collection or cerebral hemorrhage.Patient also found to have UTI.   Referred By Dr. Griggs   Past Medical History Relevant to Rehab anxiety, cardiomyopathy, HTN, dyspnea, chronic diastolic heart failure   Patient Position Received Up in chair;Alarm off, not on at start of session   Preferred Learning Style verbal;visual   General Comment Agreeable to tx   Precautions   Hearing/Visual Limitations glasses, hearing WFL   Medical Precautions Fall precautions   Pain Assessment   Pain Assessment 0-10   0-10 (Numeric) Pain Score 0 - No pain   Therapeutic Exercise   Therapeutic Exercise Activity 1 seated hamstring curls, 2x15 reps, green theraband   Therapeutic Exercise Activity 2 standing heel raises, 2x15 reps   Balance/Neuromuscular Re-Education   Balance/Neuromuscular Re-Education Activity 1 side stepping, no UE support, green theraband at knees, 10 ft x 2 each direction.  CGA   Ambulation/Gait Training 1   Surface 1 Level tile;Carpet   Device 1 No device   Assistance 1 Close supervision;Contact guard   Quality of Gait 1 NBOS;Diminished heel strike;Decreased step length;Shuffling gait;Foot sweep   Comments/Distance (ft) 1 100 ft, 2, turns included   Transfer 1   Technique 1 Sit to stand;Stand to sit   Transfer Device 1 Gait belt   Transfer Level of Assistance 1 Close supervision   Activity Tolerance   Endurance Endurance does not limit participation in activity   PT Assessment   PT  Assessment Results Decreased strength;Decreased endurance;Impaired balance;Decreased mobility;Impaired judgement;Decreased safety awareness   Rehab Prognosis Good   Evaluation/Treatment Tolerance Patient tolerated treatment well   Assessment Comment Pt reported overall fatigue after PM session, but no other issued reported.   End of Session Patient Position Up in chair   PT Plan   Inpatient/Swing Bed or Outpatient Inpatient   PT Plan   Treatment/Interventions Bed mobility;Transfer training;Gait training;Stair training;Neuromuscular re-education;Therapeutic exercise;Therapeutic activity   PT Plan Ongoing PT   PT Recommended Transfer Status Contact guard

## 2024-09-27 NOTE — PROGRESS NOTES
"Lisbeth Cruz is a 64 y.o. female on day 2 of admission presenting with Ischemic stroke (Multi).    Subjective   Pt with a h/o multipel myeloma, stage 4 CKD, who was found to have right frontal lobe stroke.  Pt is currently on meropenem for UTI.  She is seen while sitting up in wheelchair.  She offers no new complaints or concerns.  She denies any pain.  She reports a good appetite. She has had some mild diarrhea likely due to the antibiotics.  No abdominal pain.  No nausea or emesis. She denies any shortness of breath.  No lower leg edema.  No new issues per nursing.        Objective     Physical Exam  Constitutional:       General: She is not in acute distress.  Eyes:      Conjunctiva/sclera: Conjunctivae normal.      Pupils: Pupils are equal, round, and reactive to light.   Cardiovascular:      Rate and Rhythm: Normal rate and regular rhythm.      Heart sounds: No murmur heard.  Pulmonary:      Effort: Pulmonary effort is normal.      Breath sounds: No wheezing, rhonchi or rales.   Abdominal:      General: Abdomen is flat. Bowel sounds are normal. There is no distension.      Palpations: Abdomen is soft. There is no mass.      Tenderness: There is no abdominal tenderness.   Musculoskeletal:         General: No swelling. Normal range of motion.   Skin:     General: Skin is warm and dry.      Findings: No rash.   Neurological:      General: No focal deficit present.      Mental Status: She is alert and oriented to person, place, and time. Mental status is at baseline.      Motor: Weakness present.         Last Recorded Vitals  Blood pressure 118/78, pulse 80, temperature 37 °C (98.6 °F), temperature source Temporal, resp. rate 16, height 1.55 m (5' 1.02\"), weight 60.5 kg (133 lb 6.4 oz), SpO2 99%.  Intake/Output last 3 Shifts:  I/O last 3 completed shifts:  In: 50 (0.8 mL/kg) [IV Piggyback:50]  Out: - (0 mL/kg)   Weight: 60.5 kg     Relevant Results          No results found for this or any previous visit (from " the past 24 hour(s)).      This patient has a central line   Reason for the central line remaining today? Parenteral medication                 Assessment/Plan   Assessment & Plan  Ischemic stroke (Multi)    Stage 4 chronic kidney disease (Multi)    Multiple myeloma not having achieved remission (Multi)       Continue with medical management for secondary prevention: DAPT x 21 days  DVT prophylaxis has been ordered: mechanical  Lifestyle interventions for stroke prevention will also be emphasized.  A falls prevention strategy will be employed.  The importance of follow up with the neurologist will be emphasized.     Multiple myeloma on chemotherapy.     Stage IV chronic kidney disease, monitor renal function and electrolytes.    Continue sodium bicarbonate. Labs improving.     Pancytopenia.  Monitor labs.      Depression and anxiety, continue home medications.    UTI:  complete course of meropenem on 10/1             Hao Bishop PA-C

## 2024-09-27 NOTE — NURSING NOTE
Assumed care of patient. Patient here s/p right frontal lobe infarct. Generalized weakness slightly worse on the left side. History of multiple myeloma and stage IV kidney disease. Mediport to right upper chest. Has meropenem 1g Q12 for UTI. On RA. Call light is within reach. Will continue to monitor.

## 2024-09-27 NOTE — NURSING NOTE
1930: Assume care of patient, pt awake and alert in bed, breathing regular, has no complains at this time, bed alarms on for safety, call device in close reach.  2100: Assessment completed, pt denies pain, sob, nausea, chills, numbness and tingling, central line clean, dry, and intact, no issues at this time.  2200: Patient resting at this time, breathing regular, no issues at this time.

## 2024-09-28 PROCEDURE — 97110 THERAPEUTIC EXERCISES: CPT | Mod: GP,CQ

## 2024-09-28 PROCEDURE — 97530 THERAPEUTIC ACTIVITIES: CPT | Mod: GP,CQ

## 2024-09-28 PROCEDURE — 2500000004 HC RX 250 GENERAL PHARMACY W/ HCPCS (ALT 636 FOR OP/ED): Performed by: INTERNAL MEDICINE

## 2024-09-28 PROCEDURE — 2500000002 HC RX 250 W HCPCS SELF ADMINISTERED DRUGS (ALT 637 FOR MEDICARE OP, ALT 636 FOR OP/ED): Performed by: INTERNAL MEDICINE

## 2024-09-28 PROCEDURE — 97116 GAIT TRAINING THERAPY: CPT | Mod: GP,CQ

## 2024-09-28 PROCEDURE — 97535 SELF CARE MNGMENT TRAINING: CPT | Mod: GO,CO

## 2024-09-28 PROCEDURE — 2500000001 HC RX 250 WO HCPCS SELF ADMINISTERED DRUGS (ALT 637 FOR MEDICARE OP): Performed by: INTERNAL MEDICINE

## 2024-09-28 PROCEDURE — 97112 NEUROMUSCULAR REEDUCATION: CPT | Mod: GP,CQ

## 2024-09-28 PROCEDURE — 1180000001 HC REHAB PRIVATE ROOM DAILY

## 2024-09-28 PROCEDURE — 97530 THERAPEUTIC ACTIVITIES: CPT | Mod: GO,CO

## 2024-09-28 ASSESSMENT — PAIN - FUNCTIONAL ASSESSMENT
PAIN_FUNCTIONAL_ASSESSMENT: 0-10

## 2024-09-28 ASSESSMENT — ACTIVITIES OF DAILY LIVING (ADL)
BATHING_LEVEL_OF_ASSISTANCE: CLOSE SUPERVISION
BATHING_WHERE_ASSESSED: SHOWER

## 2024-09-28 ASSESSMENT — PAIN SCALES - GENERAL
PAINLEVEL_OUTOF10: 0 - NO PAIN

## 2024-09-28 NOTE — PROGRESS NOTES
Physical Therapy       09/28/24 [id number]   PT  Visit   PT Received On 09/28/24   Response to Previous Treatment Patient with no complaints from previous session.   General   Reason for Referral Patient is a 64 year old female who presented to the ED with c/o Left sided weakness and stroke like symptoms. MRI of the brain completed indicating: Acute/subacute lacunar infarction in the right frontal lobe white  matter measuring a proximally 3 mm in size. No hemorrhage or  measurable mass effect *No evidence of intracranial mass, extra-axial  collection or cerebral hemorrhage.Patient also found to have UTI.   Referred By Dr. Griggs   Past Medical History Relevant to Rehab anxiety, cardiomyopathy, HTN, dyspnea, chronic diastolic heart failure   Patient Position Received Up in chair;Alarm off, not on at start of session   Preferred Learning Style verbal;visual   General Comment Agreeable to tx   Precautions   Medical Precautions Fall precautions   Pain Assessment   Pain Assessment 0-10   0-10 (Numeric) Pain Score 0 - No pain   Therapeutic Exercise   Therapeutic Exercise Activity 1 seated hip flexion, 2x15 repsm 2# RLE, 2.5# LLE   Therapeutic Exercise Activity 2 seated LAQ, 2x15 reps, 2# RLE, 2.5# LLE   Therapeutic Activity   Therapeutic Activity 1 chair push-ups, 2x10 reps   Therapeutic Activity 2 small kyleigh pick-up, no UE support.  CGA   Balance/Neuromuscular Re-Education   Balance/Neuromuscular Re-Education Activity 1 blue therapads, no UE support, balloon toss, CGA   Balance/Neuromuscular Re-Education Activity 2 blue therapads, no UE support, cone reaching, CGA   Balance/Neuromuscular Re-Education Activity 3 blue therapads, no UE support, ball with dowel olya, CGA   Balance/Neuromuscular Re-Education Activity 4 coordination ladder, no UE support, CGA, mild instability initially, but improved over time.   Balance/Neuromuscular Re-Education Activity 5 small hurdles forward, no UE support, Valentin.  Occasional R foot catch  when trailing.   Ambulation/Gait Training 1   Surface 1 Level tile;Carpet   Device 1 No device   Assistance 1 Close supervision;Contact guard   Quality of Gait 1 NBOS;Diminished heel strike;Decreased step length;Shuffling gait;Foot sweep   Comments/Distance (ft) 1 100 ft, turns included.  Vc's for posture/heel strike/WBOS.   Ambulation/Gait Training 2   Surface 2 Level tile   Device 2 No device   Assistance 2 Contact guard   Quality of Gait 2 NBOS;Diminished heel strike;Decreased step length;Shuffling gait;Foot sweep   Comments/Distance (ft) 2 higher level gait training:  resisted forward amb followed by fast himanshu amb, 2 sets of 30 ft each.  Pt required seated rest break between each set due to fatigue.   Transfer 1   Technique 1 Sit to stand;Stand to sit   Transfer Device 1 Gait belt   Transfer Level of Assistance 1 Close supervision   Stairs   Rails 1 Bilateral   Device 1 Railing   Support Devices 1 Gait belt   Assistance 1 Close supervision   Comment/Number of Steps 1 up/down 20 steps, mix of reciprocal and step-to up, step-to down.  Close supervision.  Vc's for WBOS.   Wheelchair Activities   Propulsion Type 1 Manual   Level 1 Level tile   Method 1 Right upper extremity;Left upper extremity;Right lower extremity;Left lower extremity   Level of Assistance 1 Modified independent   Description/Details 1 ad katherine around unit   Activity Tolerance   Endurance Endurance does not limit participation in activity   PT Assessment   PT Assessment Results Decreased strength;Decreased endurance;Impaired balance;Decreased mobility;Impaired judgement;Decreased safety awareness   Rehab Prognosis Good   Assessment Comment Pt continues to participate well in tx.  Pt tolerated higher level dynamic balance tasks well, with CGA required most of time.  Pt reported overall fatigue after tx.   End of Session Patient Position Up in chair   PT Plan   Inpatient/Swing Bed or Outpatient Inpatient   PT Plan   Treatment/Interventions Bed  mobility;Transfer training;Gait training;Stair training;Neuromuscular re-education;Therapeutic exercise;Therapeutic activity   PT Plan Ongoing PT   Equipment Recommended upon Discharge Wheeled walker   PT Recommended Transfer Status Assist x1;Contact guard

## 2024-09-28 NOTE — CARE PLAN
The patient's goals for the shift include improve mobility    The clinical goals for the shift include maintain safety

## 2024-09-28 NOTE — PROGRESS NOTES
Occupational Therapy       09/28/24 [id number]   OT Last Visit   OT Received On 09/28/24   General   Reason for Referral Patient is a 64 year old female who presented to the ED with c/o Left sided weakness and stroke like symptoms. MRI of the brain completed indicating: Acute/subacute lacunar infarction in the right frontal lobe white  matter measuring a proximally 3 mm in size. No hemorrhage or  measurable mass effect *No evidence of intracranial mass, extra-axial  collection or cerebral hemorrhage.Patient also found to have UTI.   Referred By Dr. Griggs   Past Medical History Relevant to Rehab anxiety, cardiomyopathy, HTN, dyspnea, chronic diastolic heart failure   Patient Position Received Up in chair;Alarm off, not on at start of session   General Comment Agreeable to treatament   Precautions   Medical Precautions Fall precautions   Pain Assessment   Pain Assessment 0-10   0-10 (Numeric) Pain Score 0 - No pain   Cognition   Orientation Level Oriented X4   Safety/Judgement WFL   Coordination   Movements are Fluid and Coordinated Yes   Grooming   Grooming Level of Assistance Modified independent   Grooming Where Assessed Wheelchair;Sitting sinkside   Grooming Comments oral care, combing hair   UE Bathing   UE Bathing Adaptive Equipment Removeable shower head   UE Bathing Level of Assistance Distant supervision   UE Bathing Where Assessed Shower  (seated)   LE Bathing   LE Bathing Level of Assistance Close supervision   LE Bathing Where Assessed Shower   LE Bathing Comments figure 4 with BLE, kaitlin area in stance, grab bar use   UE Dressing   UE Dressing Level of Assistance Setup   UE Dressing Where Assessed Wheelchair   UE Dressing Comments doff/fede overhead shirt, stand up for pull down   LE Dressing   Pants Level of Assistance Close supervision;Setup   Sock Level of Assistance Setup   LE Dressing Where Assessed Wheelchair   LE Dressing Comments pt demonstrates good transfer technique   Bed Mobility 1   Bed  Mobility 1 Sitting to supine   Level of Assistance 1 Modified independent   Bed Mobility Comments 1 bed flat   Functional Mobility 1   Surface 1 Level tile   Device 1 No device   Assistance 1 Close supervision   Comments 1 short household distance in room   Transfer 1   Transfer From 1 Wheelchair to   Transfer to 1 Bed   Technique 1 To right   Transfer Device 1   (No device)   Transfer Level of Assistance 1 Close supervision   Trials/Comments 1 Pt takes~ 8 steps from w/c>bed.   Shower Transfers   Shower Transfer From Wheelchair  (ambulation)   Shower Transfer Type To and from   Shower Transfer to Shower seat with back   Shower Transfer Technique Ambulating;To left   Shower Transfers Supervision   Shower Transfers Comments walking in / out to w/c, use of grab bars   Therapeutic Exercise   Therapeutic Exercise Activity 1 BITS computer programs x2  for cognition(memory) and scanning.  (memory activity: 63.16 accuracy, 12 successful trials           scanning activity  100% accuracy, 3.24 mintes to complete, reaction time 7.83 sec.  26 hits)   Therapeutic Activity   Therapeutic Activity 1 matching/sorting activity with verticle board x 2 trials sitting and in stance with 1 lb wrist wt. Pt tolerates 20 minutes activity, with 10 minutes in stance, Close S. Pt demonstrates good standing tolerance, fair balance.   IP OT Assessment   OT Assessment Pt progressing well with POC goals.   Prognosis Excellent   Barriers to Discharge None   Evaluation/Treatment Tolerance Patient tolerated treatment well   End of Session Communication Bedside nurse   End of Session Patient Position Up in chair   OT Assessment   OT Assessment Results Decreased ADL status;Decreased upper extremity strength;Decreased safe judgment during ADL;Decreased endurance;Decreased functional mobility;Decreased gross motor control;Decreased IADLs   Strengths Ability to acquire knowledge;Attitude of self;Capable of completing ADLs semi/independent;Support of  Caregivers   Education   Individual(s) Educated Patient   Education Provided Fall precautons;Risk and benefits of OT discussed with patient or other;POC discussed and agreed upon   Patient Response to Education Patient/Caregiver Verbalized Understanding of Information;Patient/Caregiver Performed Return Demonstration of Exercises/Activities   Inpatient/Swing Bed or Outpatient   Inpatient/Swing Bed or Outpatient Inpatient   Inpatient Plan   Treatment Interventions ADL retraining;Functional transfer training;UE strengthening/ROM;Endurance training   OT Frequency 5 times per week   OT Discharge Recommendations High intensity level of continued care   OT - OK to Discharge Yes

## 2024-09-28 NOTE — PROGRESS NOTES
Physical Therapy       09/28/24 [id number]   PT  Visit   PT Received On 09/28/24   Response to Previous Treatment Patient with no complaints from previous session.   General   Reason for Referral Patient is a 64 year old female who presented to the ED with c/o Left sided weakness and stroke like symptoms. MRI of the brain completed indicating: Acute/subacute lacunar infarction in the right frontal lobe white  matter measuring a proximally 3 mm in size. No hemorrhage or  measurable mass effect *No evidence of intracranial mass, extra-axial  collection or cerebral hemorrhage.Patient also found to have UTI.   Referred By Dr. Griggs   Past Medical History Relevant to Rehab anxiety, cardiomyopathy, HTN, dyspnea, chronic diastolic heart failure   Patient Position Received Up in chair;Alarm off, not on at start of session   Preferred Learning Style verbal;visual   General Comment Agreeable to treatament   Precautions   Medical Precautions Fall precautions   Pain Assessment   Pain Assessment 0-10   0-10 (Numeric) Pain Score 0 - No pain   Therapeutic Exercise   Therapeutic Exercise Activity 1 seated hip abduction, 2x15 reps, orange theraband   Therapeutic Exercise Activity 2 seated hip adduction/ball squeezes, 2x15 reps   Therapeutic Activity   Therapeutic Activity 1 cone pick around gym, no AD, CGA.  Squat technique used appropriately.   Therapeutic Activity 2 sit/stands 3x5 reps, Valentin, no UE assist   Ambulation/Gait Training 1   Surface 1 Level tile;Carpet   Device 1 No device   Assistance 1 Close supervision;Contact guard   Quality of Gait 1 NBOS;Diminished heel strike;Decreased step length;Shuffling gait;Foot sweep   Comments/Distance (ft) 1 110 ft x 2, improved foot clearance noted   Transfer 1   Technique 1 Sit to stand;Stand to sit   Transfer Device 1 Gait belt   Transfer Level of Assistance 1 Close supervision   Wheelchair Activities   Propulsion Type 1 Manual   Level 1 Level tile   Method 1 Right upper extremity;Left  upper extremity;Right lower extremity;Left lower extremity   Level of Assistance 1 Modified independent   Description/Details 1 ad katherine around facility   Activity Tolerance   Endurance Endurance does not limit participation in activity   PT Assessment   PT Assessment Results Decreased strength;Decreased endurance;Impaired balance;Decreased mobility;Impaired judgement;Decreased safety awareness   Rehab Prognosis Good   Evaluation/Treatment Tolerance Patient tolerated treatment well   End of Session Patient Position Up in chair   PT Plan   Inpatient/Swing Bed or Outpatient Inpatient   PT Plan   Treatment/Interventions Bed mobility;Transfer training;Gait training;Stair training;Neuromuscular re-education;Therapeutic exercise;Therapeutic activity   PT Plan Ongoing PT   PT Recommended Transfer Status Assist x1;Contact guard

## 2024-09-29 VITALS
DIASTOLIC BLOOD PRESSURE: 73 MMHG | OXYGEN SATURATION: 98 % | HEIGHT: 61 IN | BODY MASS INDEX: 25.19 KG/M2 | TEMPERATURE: 99 F | WEIGHT: 133.4 LBS | SYSTOLIC BLOOD PRESSURE: 111 MMHG | HEART RATE: 75 BPM | RESPIRATION RATE: 16 BRPM

## 2024-09-29 LAB
BACTERIA BLD CULT: NORMAL
BACTERIA BLD CULT: NORMAL

## 2024-09-29 PROCEDURE — 2500000002 HC RX 250 W HCPCS SELF ADMINISTERED DRUGS (ALT 637 FOR MEDICARE OP, ALT 636 FOR OP/ED): Performed by: INTERNAL MEDICINE

## 2024-09-29 PROCEDURE — 1180000001 HC REHAB PRIVATE ROOM DAILY

## 2024-09-29 PROCEDURE — 2500000004 HC RX 250 GENERAL PHARMACY W/ HCPCS (ALT 636 FOR OP/ED): Performed by: INTERNAL MEDICINE

## 2024-09-29 PROCEDURE — 2500000001 HC RX 250 WO HCPCS SELF ADMINISTERED DRUGS (ALT 637 FOR MEDICARE OP): Performed by: INTERNAL MEDICINE

## 2024-09-29 ASSESSMENT — PAIN - FUNCTIONAL ASSESSMENT
PAIN_FUNCTIONAL_ASSESSMENT: 0-10
PAIN_FUNCTIONAL_ASSESSMENT: 0-10

## 2024-09-29 ASSESSMENT — PAIN SCALES - GENERAL
PAINLEVEL_OUTOF10: 0 - NO PAIN
PAINLEVEL_OUTOF10: 0 - NO PAIN

## 2024-09-29 NOTE — CARE PLAN
The patient's goals for the shift include maintain safety    The clinical goals for the shift include No Fall

## 2024-09-30 PROCEDURE — 97112 NEUROMUSCULAR REEDUCATION: CPT | Mod: GP,CQ

## 2024-09-30 PROCEDURE — 2500000001 HC RX 250 WO HCPCS SELF ADMINISTERED DRUGS (ALT 637 FOR MEDICARE OP): Performed by: INTERNAL MEDICINE

## 2024-09-30 PROCEDURE — 1180000001 HC REHAB PRIVATE ROOM DAILY

## 2024-09-30 PROCEDURE — 97530 THERAPEUTIC ACTIVITIES: CPT | Mod: GO,CO

## 2024-09-30 PROCEDURE — 97116 GAIT TRAINING THERAPY: CPT | Mod: GP,CQ

## 2024-09-30 PROCEDURE — 97535 SELF CARE MNGMENT TRAINING: CPT | Mod: GO,CO

## 2024-09-30 PROCEDURE — 97530 THERAPEUTIC ACTIVITIES: CPT | Mod: GP,CQ

## 2024-09-30 PROCEDURE — 2500000004 HC RX 250 GENERAL PHARMACY W/ HCPCS (ALT 636 FOR OP/ED): Mod: JZ | Performed by: INTERNAL MEDICINE

## 2024-09-30 PROCEDURE — 99232 SBSQ HOSP IP/OBS MODERATE 35: CPT | Performed by: INTERNAL MEDICINE

## 2024-09-30 PROCEDURE — 2500000002 HC RX 250 W HCPCS SELF ADMINISTERED DRUGS (ALT 637 FOR MEDICARE OP, ALT 636 FOR OP/ED): Performed by: INTERNAL MEDICINE

## 2024-09-30 PROCEDURE — 92507 TX SP LANG VOICE COMM INDIV: CPT | Mod: GN

## 2024-09-30 ASSESSMENT — PAIN - FUNCTIONAL ASSESSMENT
PAIN_FUNCTIONAL_ASSESSMENT: 0-10

## 2024-09-30 ASSESSMENT — BALANCE ASSESSMENTS
STANDING UNSUPPORTED: ABLE TO STAND SAFELY FOR 2 MINUTES
SITTING WITH BACK UNSUPPORTED BUT FEET SUPPORTED ON FLOOR OR ON A STOOL: ABLE TO SIT SAFELY AND SECURELY FOR 2 MINUTES
LONG VERSION TOTAL SCORE (MAX 56): 45
TURN 360 DEGREES: ABLE TO TURN 360 DEGREES SAFELY ONE SIDE ONLY 4 SECONDS OR LESS
PLACE ALTERNATE FOOT ON STEP OR STOOL WHILE STANDING UNSUPPORTED: LOOKS BEHIND FROM BOTH SIDES AND WEIGHT SHIFTS WELL
STANDING TO SITTING: SITS SAFELY WITH MINIMAL USE OF HANDS
REACHING FORWARD WITH OUTSTRETCHED ARM WHILE STANDING: CAN REACH FORWARD CONFIDENTLY 25 CM (10 INCHES)
STANDING UNSUPPORTED WITH FEET TOGETHER: ABLE TO PLACE FEET TOGETHER INDEPENDENTLY AND STAND 1 MINUTE WITH SUPERVISION
PLACE ALTERNATE FOOT ON STEP OR STOOL WHILE STANDING UNSUPPORTED: ABLE TO COMPLETE GREATER THAN 2 STEPS NEEDS MINIMAL ASSIST
PICK UP OBJECT FROM THE FLOOR FROM A STANDING POSITION: ABLE TO PICK UP SLIPPER SAFELY AND EASILY
STANDING UNSUPPORTED ONE FOOT IN FRONT: ABLE TO PLACE FOOT AHEAD INDEPENDENTLY AND HOLD 30 SECONDS
TRANSFERS: ABLE TO TRANSFER SAFELY DEFINITE NEED OF HANDS
STANDING ON ONE LEG: TRIES TO LIFT LEG UNABLE TO HOLD 3 SECONDS BUT REMAINS STANDING INDEPENDENTLY
STANDING TO SITTING: ABLE TO STAND INDEPENDENTLY USING HANDS
STANDING UNSUPPORTED WITH EYES CLOSED: ABLE TO STAND 10 SECONDS SAFELY

## 2024-09-30 ASSESSMENT — ACTIVITIES OF DAILY LIVING (ADL)
BATHING_LEVEL_OF_ASSISTANCE: DISTANT SUPERVISION
HOME_MANAGEMENT_TIME_ENTRY: 15
HOME_MANAGEMENT_TIME_ENTRY: 30
BATHING_WHERE_ASSESSED: SHOWER
BATHING_EQUIPMENT_NEEDED: REMOVEABLE SHOWER HEAD

## 2024-09-30 ASSESSMENT — PAIN SCALES - GENERAL
PAINLEVEL_OUTOF10: 0 - NO PAIN
PAINLEVEL_OUTOF10: 3
PAINLEVEL_OUTOF10: 0 - NO PAIN
PAINLEVEL_OUTOF10: 3

## 2024-09-30 ASSESSMENT — PAIN SCALES - WONG BAKER: WONGBAKER_NUMERICALRESPONSE: NO HURT

## 2024-09-30 NOTE — NURSING NOTE
Nurse to nurse report given.  The patient is resting in bed this morning with the call light within reach.

## 2024-09-30 NOTE — PROGRESS NOTES
Physical Therapy       24 [id number]   PT  Visit   PT Received On 24   Response to Previous Treatment Patient with no complaints from previous session.   General   Reason for Referral Patient is a 64 year old female who presented to the ED with c/o Left sided weakness and stroke like symptoms. MRI of the brain completed indicating: Acute/subacute lacunar infarction in the right frontal lobe white  matter measuring a proximally 3 mm in size. No hemorrhage or  measurable mass effect *No evidence of intracranial mass, extra-axial  collection or cerebral hemorrhage.Patient also found to have UTI.   Referred By Dr. Griggs   Past Medical History Relevant to Rehab anxiety, cardiomyopathy, HTN, dyspnea, chronic diastolic heart failure   Patient Position Received Up in room   Preferred Learning Style verbal;visual   General Comment Pt agreeable to tx   Precautions   Hearing/Visual Limitations glasses, hearing WFL   Pain Assessment   Pain Assessment 0-10   0-10 (Numeric) Pain Score 0 - No pain   Therapeutic Activity   Therapeutic Activity 1 nu-step x 10 minutes, level 1 resistance, 469 steps   Balance/Neuromuscular Re-Education   Balance/Neuromuscular Re-Education Activity 1 GARAY/56 (from 35/56)   Balance/Neuromuscular Re-Education Activity 2 TU.25 sec, no AD (from 27.03 sec)   Balance/Neuromuscular Re-Education Activity 3 // bars (no UE support):  air ex balance beam, side-stepping x2 trials both directions.  CG/Valentin given.   Ambulation/Gait Training 1   Surface 1 Level tile;Outdoors;Carpet   Device 1 No device   Assistance 1 Close supervision;Contact guard   Quality of Gait 1 NBOS;Diminished heel strike;Decreased step length;Shuffling gait;Foot sweep   Comments/Distance (ft) 1 120 ft x 5, ouside on level concrete, over curbs, on grass, on gravel.  Close supervision on level surfaces, CGA on uneven/unstable surfaces.  Vc's for heel strike to reduce shuffling gait pattern and iprove gait quality.    Transfer 1   Technique 1 Sit to stand;Stand to sit   Transfer Device 1 Gait belt   Transfer Level of Assistance 1 Set up   Stairs   Rails 1 Bilateral   Device 1 Railing   Support Devices 1 Gait belt   Assistance 1 Set up   Comment/Number of Steps 1 up/down 1 flight, step-to pattern.  Vc's initially for WBOS    Object From Floor   Devices No reacher;No device   Assist Level Modified independent   Comments small cone from floor   Wheelchair Activities   Propulsion Type 1 Manual   Level 1 Level tile   Method 1 Right upper extremity;Left upper extremity;Right lower extremity;Left lower extremity   Level of Assistance 1 Modified independent   Description/Details 1 ad katherine around facility   Activity Tolerance   Endurance Endurance does not limit participation in activity   PT Assessment   PT Assessment Results Decreased strength;Decreased endurance;Impaired balance;Decreased mobility;Impaired judgement;Decreased safety awareness   Rehab Prognosis Good   Assessment Comment Pt has met 6 out of 11 goals at this time.  TUG score improved from 27 sec to 20 sec.  GARAY score improved from 35/56 to 45/56.  Pt required cuing during gait training to reduce shuffling tendencies and is mildy unsteady on uneven surfaces, requiring CGA for stability.  No AD used during gait training, but RW may be needed for safe D/C home.   End of Session Patient Position Up in chair   PT Plan   Inpatient/Swing Bed or Outpatient Inpatient   PT Plan   Treatment/Interventions Bed mobility;Transfer training;Gait training;Stair training;Neuromuscular re-education;Therapeutic exercise;Therapeutic activity   PT Plan Ongoing PT   Equipment Recommended upon Discharge Wheeled walker   PT Recommended Transfer Status Assist x1;Contact guard

## 2024-09-30 NOTE — PROGRESS NOTES
"[facility]  Physician Progress Note    Subjective   Lisbeth Cruz is a 64 y.o. female admitted to inpatient rehabilitation unit.    The patient is adjusting well to the acute rehabilitation unit and has been oriented to their surroundings and the routine.  Fall precautions have been explained in detail.  The therapy evaluations have been completed and reviewed/approved by me.  The patient denies chest pain and shortness of breath.  No new complaints are conveyed.      Objective   /73 (BP Location: Right arm, Patient Position: Sitting)   Pulse 75   Temp 37.2 °C (99 °F) (Temporal)   Resp 16   Ht 1.55 m (5' 1.02\")   Wt 60.5 kg (133 lb 6.4 oz)   SpO2 98%   BMI 25.19 kg/m²      Physical Exam  Constitutional:       General: She is not in acute distress.     Appearance: She is not toxic-appearing.   HENT:      Head: Normocephalic and atraumatic.      Mouth/Throat:      Mouth: Mucous membranes are moist.   Eyes:      Pupils: Pupils are equal, round, and reactive to light.   Cardiovascular:      Rate and Rhythm: Normal rate and regular rhythm.      Heart sounds: No murmur heard.  Pulmonary:      Breath sounds: Normal breath sounds. No wheezing, rhonchi or rales.   Abdominal:      General: There is no distension.      Palpations: Abdomen is soft.   Musculoskeletal:      Right lower leg: No edema.      Left lower leg: No edema.   Neurological:      General: No focal deficit present.      Mental Status: She is alert and oriented to person, place, and time.      Mild diffuse weakness in both upper and lower extremities but nonfocal on my exam.     The right chest port is intact and functional.    Labs  BMP:  Results from last 7 days   Lab Units 09/26/24  0406   CREATININE mg/dL 1.50*   BUN mg/dL 16   SODIUM mmol/L 140   POTASSIUM mmol/L 3.5   CHLORIDE mmol/L 110*   CO2 mmol/L 20*     CBC:  Results from last 7 days   Lab Units 09/26/24  0406   WBC AUTO x10*3/uL " 4.3*   HEMOGLOBIN g/dL 9.8*   HEMATOCRIT % 30.5*   MCV fL 100   PLATELETS AUTO x10*3/uL 105*     Coagulation:       Assesment and Plan    Ischemic Stroke (Multi)   Multiple Myeloma Not Having Achieved Remission (Multi)   Stage 4 Chronic Kidney Disease (Multi)      Continue with the coordinated, interdisciplinary rehabilitation effort.  Continue medications for secondary prevention: DAPT x 21 days     Multiple myeloma on chemotherapy.     Stage IV chronic kidney disease, monitor renal function and electrolytes.  The serum creatinine has been improving fairly steadily over the past several days.  GFR is now 38.  Continue sodium bicarbonate. Recheck labs.     Pancytopenia.     Depression and anxiety, continue home medications.    Yamil Griggs MD

## 2024-09-30 NOTE — CARE PLAN
Problem: Skin  Goal: Decreased wound size/increased tissue granulation at next dressing change  Flowsheets (Taken 9/30/2024 1138)  Decreased wound size/increased tissue granulation at next dressing change: Promote sleep for wound healing  Note: .  Goal: Participates in plan/prevention/treatment measures  Flowsheets (Taken 9/30/2024 1138)  Participates in plan/prevention/treatment measures: Increase activity/out of bed for meals  Note: .  Goal: Prevent/manage excess moisture  Outcome: Progressing  Flowsheets (Taken 9/30/2024 1138)  Prevent/manage excess moisture: Moisturize dry skin  Note: .  Goal: Prevent/minimize sheer/friction injuries  Outcome: Progressing  Flowsheets (Taken 9/30/2024 1138)  Prevent/minimize sheer/friction injuries: Use pull sheet  Note: .  Goal: Promote/optimize nutrition  Flowsheets (Taken 9/30/2024 1138)  Promote/optimize nutrition: Monitor/record intake including meals  Note: .  Goal: Promote skin healing  Flowsheets (Taken 9/30/2024 1138)  Promote skin healing: Turn/reposition every 2 hours/use positioning/transfer devices  Note: .     Problem: Skin  Goal: Participates in plan/prevention/treatment measures  Flowsheets (Taken 9/30/2024 1138)  Participates in plan/prevention/treatment measures: Increase activity/out of bed for meals  Note: .   The patient's goals for the shift include maintain safety    The clinical goals for the shift include maintain safety

## 2024-09-30 NOTE — PROGRESS NOTES
Physical Therapy       09/30/24 [id number]   PT  Visit   PT Received On 09/30/24   Response to Previous Treatment Patient with no complaints from previous session.   General   Reason for Referral Patient is a 64 year old female who presented to the ED with c/o Left sided weakness and stroke like symptoms. MRI of the brain completed indicating: Acute/subacute lacunar infarction in the right frontal lobe white  matter measuring a proximally 3 mm in size. No hemorrhage or  measurable mass effect *No evidence of intracranial mass, extra-axial  collection or cerebral hemorrhage.Patient also found to have UTI.   Referred By Dr. Griggs   Past Medical History Relevant to Rehab anxiety, cardiomyopathy, HTN, dyspnea, chronic diastolic heart failure   Patient Position Received Up in chair   Preferred Learning Style verbal;visual   General Comment Pt agreeable to tx   Precautions   Hearing/Visual Limitations glasses, hearing WFL   Medical Precautions Fall precautions   Pain Assessment   Pain Assessment 0-10   0-10 (Numeric) Pain Score 0 - No pain   Therapeutic Exercise   Therapeutic Exercise Activity 1 seated hamstring curls, 2x15 reps, orange theraband   Therapeutic Activity   Therapeutic Activity 1 sit/stands, 2x5 reps, no UE support, CG/Valentin with vc's for forward weight-shifting.   Balance/Neuromuscular Re-Education   Balance/Neuromuscular Re-Education Activity 1 coordination ladder, x2 trials, no UE support, CGA   Ambulation/Gait Training 1   Surface 1 Level tile;Carpet   Device 1 No device   Assistance 1 Close supervision;Contact guard   Quality of Gait 1 NBOS;Diminished heel strike;Decreased step length;Shuffling gait;Foot sweep   Comments/Distance (ft) 1 120 ft x 3, multiple turns.  Close supervision to CGA with fatigue.  Improved foot clearance/less shuffling noted, but still required intermittent reminders.   Transfer 1   Technique 1 Sit to stand;Stand to sit   Transfer Device 1 Gait belt   Transfer Level of Assistance  1 Modified independent   Wheelchair Activities   Propulsion Type 1 Manual   Level 1 Level tile   Method 1 Right upper extremity;Left upper extremity;Right lower extremity;Left lower extremity   Level of Assistance 1 Modified independent   Activity Tolerance   Endurance Endurance does not limit participation in activity   PT Assessment   PT Assessment Results Decreased strength;Decreased endurance;Impaired balance;Decreased mobility;Impaired judgement;Decreased safety awareness   Rehab Prognosis Good   Evaluation/Treatment Tolerance Patient tolerated treatment well   End of Session Patient Position Up in chair   PT Plan   Inpatient/Swing Bed or Outpatient Inpatient   PT Plan   Treatment/Interventions Bed mobility;Transfer training;Gait training;Stair training;Neuromuscular re-education;Therapeutic exercise;Therapeutic activity   PT Plan Ongoing PT   Equipment Recommended upon Discharge Wheeled walker   PT Recommended Transfer Status Assist x1;Contact guard

## 2024-09-30 NOTE — PROGRESS NOTES
Speech-Language Pathology    SLP Adult IRF [facility] Speech-Language Pathology Treatment     Patient Name: Lisbeth Cruz  MRN: [id number]  Today's Date: 9/30/2024  Time Calculation  Start Time: 0810  Stop Time: 0900  Time Calculation (min): 50 min     2/4sp    Current Problem:   Acute/subacute lacunar infarction in the right frontal lobe white matter. Patient also found to have UTI.     SLP Assessment:  SLP TX Intervention Outcome: Making Progress Towards Goals  SLP Assessment Results: Cognitive Deficits  Prognosis: Good  Treatment Tolerance: Patient tolerated treatment well  Medical Staff Made Aware: Yes  Strengths: Motivation, Family/Caregiver Support  Education Provided: Yes     Plan:  Inpatient/Swing Bed or Outpatient: Inpatient  Treatment/Interventions: Cognitive communication functioning  SLP TX Plan: Continue Plan of Care  SLP Plan: Skilled SLP  SLP Frequency: 4x per week  Duration: 2 weeks  SLP Discharge Recommendations:  (TBD pending progress)  Next Treatment Priority: TO, PS, STM/recall  Discussed POC: Patient, Nursing  Discussed Risks/Benefits: Yes, Patient, Nursing  Patient/Caregiver Agreeable: Yes      Subjective   Pt seen upright in bed. Student nurse is present at bedside. Observing tx session.     General Visit Information:   Reason for Referral: Follow-up speech-language treatment  Referred By: Dr. Griggs  Past Medical History Relevant to Rehab: anxiety, cardiomyopathy, HTN, dyspnea, chronic diastolic heart failure  Prior to Session Communication: Bedside nurse    Pain Assessment:  Pain Assessment  0-10 (Numeric) Pain Score: 3  Pain Location: Hip      Objective   SPEECH-LANGUAGE GOALS (established 9/26, anticipate end 2 weeks):  Pt will increase thought organization skills to 80% accuracy with cues to participate in higher level language tasks.  PROGRESS: Improved concentration for written/visual stimuli.  STATUS: continue, progressing with decreased cues  2.  Pt will complete functional problem solving  tasks with 80% acc in order to make decisions about care needs and participate safely in ADLs.  PROGRESS: Able to press call button independently and ask staff for assistance.  STATUS: continue, progressing with decreased cues  3. Ongoing assessment if indicated for further goal development, as needed.  PROGRESS: see CLQT results below. Tx session focused on CLQT and discussing results.  STATUS: continue as needed    Cognitive Linguistics:  Cognitive Linguistics Comments: CLQT    SLP Outcome Measures:   The Cognitive Linguistic Quick Test (CLQT) is a criterion-referenced measure to quickly assess strengths and weaknesses in five cognitive domains.  Attention Domain:  69. Moderate (2)  Memory Domain:  145, mild (3)  Executive Function Domain: 22, mild (3)  Language Domain:  27, mild (3)  Visuospatial Skills Domain: 55, mild (3)     Composite Severity Ratin.8, mild     Inpatient Education:  Adult Inpatient Education  Individual(s) Educated: Patient  Verbal Education : CLQT results  Risk and Benefits Discussed with Patient/Caregiver/Other: yes  Patient/Caregiver Demonstrated Understanding: yes  Plan of Care Discussed and Agreed Upon: yes  Patient Response to Education: Patient/Caregiver Verbalized Understanding of Information

## 2024-09-30 NOTE — PROGRESS NOTES
Care transitions called and spoke with patient daughter, Diana, and discussed Diana coming for family training tomorrow, 10/01/2024. Kayla to come for training 10/01/2024 at 0930. PT/OT informed. Per Diana, patient has a rollator that was a hand me down and the arms keep falling off. Therapy team to dispense a new rollator at discharge.

## 2024-09-30 NOTE — PROGRESS NOTES
Occupational Therapy       09/30/24 [id number]   OT Last Visit   OT Received On 09/30/24   General   Reason for Referral Patient is a 64 year old female who presented to the ED with c/o Left sided weakness and stroke like symptoms. MRI of the brain completed indicating: Acute/subacute lacunar infarction in the right frontal lobe white  matter measuring a proximally 3 mm in size. No hemorrhage or  measurable mass effect *No evidence of intracranial mass, extra-axial  collection or cerebral hemorrhage.Patient also found to have UTI.   Referred By Dr. Griggs   Past Medical History Relevant to Rehab anxiety, cardiomyopathy, HTN, dyspnea, chronic diastolic heart failure   Prior to Session Communication Bedside nurse   Patient Position Received Bed, 3 rail up;Alarm off, not on at start of session   Preferred Learning Style verbal;visual   General Comment Pt cleared by NSG and agreeable to skilled OT intervention   Precautions   Hearing/Visual Limitations glasses, hearing WFL   Medical Precautions Fall precautions   Precautions Comment Port present cover with shower   Pain Assessment   Pain Assessment 0-10   0-10 (Numeric) Pain Score 0 - No pain   Cognition   Overall Cognitive Status WFL   Orientation Level Oriented X4   Processing Speed Delayed   RUE    RUE  WFL   LUE    LUE WFL   Grooming   Grooming Level of Assistance Modified independent   Grooming Where Assessed Wheelchair;Sitting sinkside   Grooming Comments Items in reach Pt brushing teeth,combing hair   UE Bathing   UE Bathing Adaptive Equipment Removeable shower head   UE Bathing Level of Assistance Setup   UE Bathing Where Assessed Shower   UE Bathing Comments all task seated   LE Bathing   LE Bathing Adaptive Equipment Removeable shower head   LE Bathing Level of Assistance Distant supervision   LE Bathing Where Assessed Shower   LE Bathing Comments figure four wash B feet and stance for kaitlin care   UE Dressing   UE Dressing Level of Assistance Setup   UE  Dressing Where Assessed Wheelchair   UE Dressing Comments Pt doff/don dress seated   LE Dressing   LE Dressing Yes   Pants Level of Assistance Setup   Sock Level of Assistance Setup   LE Dressing Where Assessed Wheelchair   LE Dressing Comments Pt doff/don LB clothing with efficiency figure four technique   Toileting   Toileting Level of Assistance Distant supervision   Where Assessed Toilet   Toileting Comments Pt adjusting clothing prior/after/hygiene   Functional Standing Tolerance   Time 4 minutes   Activity ADL skills/transfers   Functional Standing Tolerance Comments G balance   Bed Mobility   Bed Mobility Yes   Bed Mobility 1   Bed Mobility 1 Supine to sitting;Sitting to supine   Level of Assistance 1 Independent   Bed Mobility Comments 1 bed to neutral   Bed Mobility 2   Bed Mobility  2 Scooting   Level of Assistance 2 Independent   Bed Mobility Comments 2 seated to edge of bed   Functional Mobility   Functional Mobility Performed Yes   Functional Mobility 1   Surface 1 Level tile   Device 1 No device   Assistance 1 Distant supervision   Comments 1 15' x 2 to include doorway, turns and backing   Transfers   Transfer Yes   Transfer 1   Transfer From 1 Bed to   Transfer to 1 Stand   Technique 1 Sit to stand;Stand to sit   Transfer Level of Assistance 1 Distant supervision   Toilet Transfers   Toilet Transfer From Other (Comment)  (no device)   Toilet Transfer Type To and from   Toilet Transfer to Standard toilet   Toilet Transfer Technique Ambulating   Toilet Transfers Set up   Shower Transfers   Shower Transfer From Other (Comment)  (toilet)   Shower Transfer Type To and from   Shower Transfer to Shower seat with back   Shower Transfer Technique Ambulating;To right;To left   Shower Transfers Set up   Shower Transfers Comments grab bar use   Light Housekeeping   Light Housekeeping Level of Assistance Distant supervision   Light Housekeeping Level   (seated /stance)   Light Housekeeping Pt changing bed linens  twin bed seated /stance   IP OT Assessment   OT Assessment Pt highly motivated progressing with established POC.  Will continue to address remaining deficits with skilled OT intervetinon.   Prognosis Excellent   Barriers to Discharge None   Evaluation/Treatment Tolerance Patient tolerated treatment well   Medical Staff Made Aware Yes   End of Session Communication Bedside nurse   End of Session Patient Position Up in chair  (call light in reach all needs met)   OT Assessment   OT Assessment Results Decreased ADL status;Decreased upper extremity strength;Decreased safe judgment during ADL;Decreased endurance;Decreased functional mobility;Decreased gross motor control;Decreased IADLs   Strengths Ability to acquire knowledge;Attitude of self;Coping skills   Barriers to Participation Comorbidities   Inpatient/Swing Bed or Outpatient   Inpatient/Swing Bed or Outpatient Inpatient   Inpatient Plan   Treatment Interventions ADL retraining;Functional transfer training;Endurance training;Patient/family training;Equipment evaluation/education;Compensatory technique education   OT Frequency 5 times per week   Equipment Recommended upon Discharge   (TBD)   OT Recommended Transfer Status Assist of 1   OT - OK to Discharge Yes

## 2024-09-30 NOTE — CARE PLAN
The patient's goals for the shift include maintain safety    The clinical goals for the shift include Maintain safety/improve adls    Over the shift, the patient did not make progress toward the following goals. Barriers to progression include impaired mobility. Recommendations to address these barriers include improve mobility.

## 2024-09-30 NOTE — PROGRESS NOTES
Occupational Therapy       09/30/24 [id number]   OT Last Visit   OT Received On 09/30/24   General   Reason for Referral Patient is a 64 year old female who presented to the ED with c/o Left sided weakness and stroke like symptoms. MRI of the brain completed indicating: Acute/subacute lacunar infarction in the right frontal lobe white  matter measuring a proximally 3 mm in size. No hemorrhage or  measurable mass effect *No evidence of intracranial mass, extra-axial  collection or cerebral hemorrhage.Patient also found to have UTI.   Referred By Dr. Griggs   Past Medical History Relevant to Rehab anxiety, cardiomyopathy, HTN, dyspnea, chronic diastolic heart failure   Patient Position Received Up in chair   Preferred Learning Style verbal;visual   General Comment Pt agreeable to tx   Precautions   Hearing/Visual Limitations glasses, hearing WFL   Medical Precautions Fall precautions   Pain Assessment   Pain Assessment 0-10   0-10 (Numeric) Pain Score 0 - No pain   Cognition   Orientation Level Oriented X4   Transfers   Transfer Yes   Transfer 1   Transfer From 1 Wheelchair to   Transfer to 1 Stand   Technique 1 Sit to stand;Stand to sit   Transfer Device 1 Gait belt   Transfer Level of Assistance 1 Modified independent   Trials/Comments 1 no device   Dynamic Standing Balance   Dynamic Standing-Balance Support No upper extremity supported   Dynamic Standing-Level of Assistance Distant supervision   Dynamic Standing-Balance Forward lean;Reaching for objects;Reaching across midline   Dynamic Standing-Comments during Homemaking skills   Car Transfers   Car Transfer From Wheelchair   Car Transfer Technique To left;To right   Car Transfers Supervision   Car Transfers Comments Simulated car transfer   Meal Prep   Meal Prep Level of Assistance Distant supervision   Meal Prep Level Other (Comment)  (stance no device)   Meal Preparation Pt preparing egg electric cook top good safety and sequencing   Kitchen Mobility   Kitchen  Mobility Level of Assistance Distant supervision   Kitchen-Mobility Level Other (Comment)  (no device)   Kitchen Activity Retrieve items;Transport items   Kitchen Mobility Comments Pt in stance 9 minutes with removal items refrigerator, cabinet low/high/drawers good safety   Therapeutic Exercise   Therapeutic Exercise Performed Yes  (established HEP with use 1# resistance handout issuance with education proper pacing, joint alignmnet and counting reps aloud with good follow throu)   Therapeutic Exercise Activity 1 forearm pronation/ supination   Therapeutic Exercise Activity 2 shoulder abduction/adduction   Therapeutic Exercise Activity 3 shoulder flexion/extension   Therapeutic Exercise Activity 4 elbow flexion /extension   Therapeutic Exercise Activity 5 horizontal abduction/adduction   Therapeutic Activity   Therapeutic Activity Performed Yes   Therapeutic Activity 1 challenged reaction time,sequencing and funcitonal reach with BITS program memory category with  2 trials 88% and 80%  accuracy   IP OT Assessment   OT Assessment Pt makiing good progress towards POC.  Will continue to address remaining deficits with skilled OT intervention.   Prognosis Excellent   Barriers to Discharge None   Evaluation/Treatment Tolerance Patient tolerated treatment well   Medical Staff Made Aware Yes   End of Session Communication Bedside nurse   End of Session Patient Position Up in chair  (call light in reach all needs met.)   Inpatient/Swing Bed or Outpatient   Inpatient/Swing Bed or Outpatient Inpatient   Inpatient Plan   Treatment Interventions ADL retraining;Endurance training;Functional transfer training;Compensatory technique education;Patient/family training   OT Frequency 5 times per week   OT Recommended Transfer Status Assist of 1   OT - OK to Discharge Yes

## 2024-10-01 PROBLEM — I63.9 ISCHEMIC STROKE (MULTI): Status: RESOLVED | Noted: 2024-09-25 | Resolved: 2024-10-01

## 2024-10-01 PROCEDURE — 2500000002 HC RX 250 W HCPCS SELF ADMINISTERED DRUGS (ALT 637 FOR MEDICARE OP, ALT 636 FOR OP/ED): Performed by: INTERNAL MEDICINE

## 2024-10-01 PROCEDURE — 99232 SBSQ HOSP IP/OBS MODERATE 35: CPT | Performed by: INTERNAL MEDICINE

## 2024-10-01 PROCEDURE — 97112 NEUROMUSCULAR REEDUCATION: CPT | Mod: GP,CQ

## 2024-10-01 PROCEDURE — 97116 GAIT TRAINING THERAPY: CPT | Mod: GP,CQ

## 2024-10-01 PROCEDURE — 97535 SELF CARE MNGMENT TRAINING: CPT | Mod: GO

## 2024-10-01 PROCEDURE — 97530 THERAPEUTIC ACTIVITIES: CPT | Mod: GO

## 2024-10-01 PROCEDURE — RXMED WILLOW AMBULATORY MEDICATION CHARGE

## 2024-10-01 PROCEDURE — 92507 TX SP LANG VOICE COMM INDIV: CPT | Mod: GN

## 2024-10-01 PROCEDURE — 2500000004 HC RX 250 GENERAL PHARMACY W/ HCPCS (ALT 636 FOR OP/ED): Mod: JZ | Performed by: INTERNAL MEDICINE

## 2024-10-01 PROCEDURE — 2500000001 HC RX 250 WO HCPCS SELF ADMINISTERED DRUGS (ALT 637 FOR MEDICARE OP): Performed by: INTERNAL MEDICINE

## 2024-10-01 PROCEDURE — 97110 THERAPEUTIC EXERCISES: CPT | Mod: GO

## 2024-10-01 PROCEDURE — 97530 THERAPEUTIC ACTIVITIES: CPT | Mod: GP,CQ

## 2024-10-01 PROCEDURE — 97110 THERAPEUTIC EXERCISES: CPT | Mod: GP,CQ

## 2024-10-01 PROCEDURE — 1180000001 HC REHAB PRIVATE ROOM DAILY

## 2024-10-01 RX ORDER — CLOPIDOGREL BISULFATE 75 MG/1
75 TABLET ORAL DAILY
Qty: 30 TABLET | Refills: 0 | Status: SHIPPED | OUTPATIENT
Start: 2024-10-01 | End: 2024-11-01

## 2024-10-01 RX ORDER — DOXYLAMINE SUCCINATE 25 MG
TABLET ORAL 2 TIMES DAILY
Status: DISCONTINUED | OUTPATIENT
Start: 2024-10-01 | End: 2024-10-02 | Stop reason: HOSPADM

## 2024-10-01 SDOH — SOCIAL STABILITY: SOCIAL INSECURITY: WITHIN THE LAST YEAR, HAVE YOU BEEN AFRAID OF YOUR PARTNER OR EX-PARTNER?: NO

## 2024-10-01 SDOH — HEALTH STABILITY: PHYSICAL HEALTH: ON AVERAGE, HOW MANY MINUTES DO YOU ENGAGE IN EXERCISE AT THIS LEVEL?: 0 MIN

## 2024-10-01 SDOH — SOCIAL STABILITY: SOCIAL INSECURITY: WITHIN THE LAST YEAR, HAVE YOU BEEN HUMILIATED OR EMOTIONALLY ABUSED IN OTHER WAYS BY YOUR PARTNER OR EX-PARTNER?: NO

## 2024-10-01 SDOH — HEALTH STABILITY: MENTAL HEALTH
STRESS IS WHEN SOMEONE FEELS TENSE, NERVOUS, ANXIOUS, OR CAN'T SLEEP AT NIGHT BECAUSE THEIR MIND IS TROUBLED. HOW STRESSED ARE YOU?: NOT AT ALL

## 2024-10-01 SDOH — HEALTH STABILITY: MENTAL HEALTH
HOW OFTEN DO YOU NEED TO HAVE SOMEONE HELP YOU WHEN YOU READ INSTRUCTIONS, PAMPHLETS, OR OTHER WRITTEN MATERIAL FROM YOUR DOCTOR OR PHARMACY?: NEVER

## 2024-10-01 SDOH — HEALTH STABILITY: MENTAL HEALTH: HOW OFTEN DO YOU HAVE SIX OR MORE DRINKS ON ONE OCCASION?: NEVER

## 2024-10-01 SDOH — HEALTH STABILITY: PHYSICAL HEALTH: ON AVERAGE, HOW MANY DAYS PER WEEK DO YOU ENGAGE IN MODERATE TO STRENUOUS EXERCISE (LIKE A BRISK WALK)?: 0 DAYS

## 2024-10-01 SDOH — ECONOMIC STABILITY: INCOME INSECURITY: IN THE PAST 12 MONTHS HAS THE ELECTRIC, GAS, OIL, OR WATER COMPANY THREATENED TO SHUT OFF SERVICES IN YOUR HOME?: NO

## 2024-10-01 SDOH — ECONOMIC STABILITY: HOUSING INSECURITY: IN THE LAST 12 MONTHS, WAS THERE A TIME WHEN YOU WERE NOT ABLE TO PAY THE MORTGAGE OR RENT ON TIME?: NO

## 2024-10-01 SDOH — SOCIAL STABILITY: SOCIAL NETWORK: ARE YOU MARRIED, WIDOWED, DIVORCED, SEPARATED, NEVER MARRIED, OR LIVING WITH A PARTNER?: PATIENT DECLINED

## 2024-10-01 SDOH — ECONOMIC STABILITY: TRANSPORTATION INSECURITY: IN THE PAST 12 MONTHS, HAS LACK OF TRANSPORTATION KEPT YOU FROM MEDICAL APPOINTMENTS OR FROM GETTING MEDICATIONS?: NO

## 2024-10-01 SDOH — SOCIAL STABILITY: SOCIAL NETWORK: HOW OFTEN DO YOU ATTEND CHURCH OR RELIGIOUS SERVICES?: NEVER

## 2024-10-01 SDOH — ECONOMIC STABILITY: INCOME INSECURITY: IN THE LAST 12 MONTHS, WAS THERE A TIME WHEN YOU WERE NOT ABLE TO PAY THE MORTGAGE OR RENT ON TIME?: NO

## 2024-10-01 SDOH — ECONOMIC STABILITY: FOOD INSECURITY: WITHIN THE PAST 12 MONTHS, THE FOOD YOU BOUGHT JUST DIDN'T LAST AND YOU DIDN'T HAVE MONEY TO GET MORE.: NEVER TRUE

## 2024-10-01 SDOH — HEALTH STABILITY: MENTAL HEALTH: HOW OFTEN DO YOU HAVE A DRINK CONTAINING ALCOHOL?: NEVER

## 2024-10-01 SDOH — ECONOMIC STABILITY: HOUSING INSECURITY: IN THE PAST 12 MONTHS, HOW MANY TIMES HAVE YOU MOVED WHERE YOU WERE LIVING?: 0

## 2024-10-01 SDOH — ECONOMIC STABILITY: INCOME INSECURITY: IN THE PAST 12 MONTHS, HAS THE ELECTRIC, GAS, OIL, OR WATER COMPANY THREATENED TO SHUT OFF SERVICE IN YOUR HOME?: NO

## 2024-10-01 SDOH — SOCIAL STABILITY: SOCIAL NETWORK
DO YOU BELONG TO ANY CLUBS OR ORGANIZATIONS SUCH AS CHURCH GROUPS, UNIONS, FRATERNAL OR ATHLETIC GROUPS, OR SCHOOL GROUPS?: NO

## 2024-10-01 SDOH — SOCIAL STABILITY: SOCIAL NETWORK: IN A TYPICAL WEEK, HOW MANY TIMES DO YOU TALK ON THE PHONE WITH FAMILY, FRIENDS, OR NEIGHBORS?: ONCE A WEEK

## 2024-10-01 SDOH — SOCIAL STABILITY: SOCIAL INSECURITY: ARE YOU MARRIED, WIDOWED, DIVORCED, SEPARATED, NEVER MARRIED, OR LIVING WITH A PARTNER?: PATIENT DECLINED

## 2024-10-01 SDOH — ECONOMIC STABILITY: INCOME INSECURITY: HOW HARD IS IT FOR YOU TO PAY FOR THE VERY BASICS LIKE FOOD, HOUSING, MEDICAL CARE, AND HEATING?: NOT HARD AT ALL

## 2024-10-01 SDOH — SOCIAL STABILITY: SOCIAL NETWORK
DO YOU BELONG TO ANY CLUBS OR ORGANIZATIONS SUCH AS CHURCH GROUPS UNIONS, FRATERNAL OR ATHLETIC GROUPS, OR SCHOOL GROUPS?: NO

## 2024-10-01 SDOH — ECONOMIC STABILITY: FOOD INSECURITY: WITHIN THE PAST 12 MONTHS, YOU WORRIED THAT YOUR FOOD WOULD RUN OUT BEFORE YOU GOT MONEY TO BUY MORE.: NEVER TRUE

## 2024-10-01 SDOH — SOCIAL STABILITY: SOCIAL NETWORK: HOW OFTEN DO YOU ATTENT MEETINGS OF THE CLUB OR ORGANIZATION YOU BELONG TO?: NEVER

## 2024-10-01 SDOH — HEALTH STABILITY: MENTAL HEALTH: HOW OFTEN DO YOU HAVE 6 OR MORE DRINKS ON ONE OCCASION?: NEVER

## 2024-10-01 SDOH — ECONOMIC STABILITY: HOUSING INSECURITY: AT ANY TIME IN THE PAST 12 MONTHS, WERE YOU HOMELESS OR LIVING IN A SHELTER (INCLUDING NOW)?: NO

## 2024-10-01 SDOH — SOCIAL STABILITY: SOCIAL NETWORK: HOW OFTEN DO YOU GET TOGETHER WITH FRIENDS OR RELATIVES?: ONCE A WEEK

## 2024-10-01 SDOH — HEALTH STABILITY: MENTAL HEALTH: HOW MANY DRINKS CONTAINING ALCOHOL DO YOU HAVE ON A TYPICAL DAY WHEN YOU ARE DRINKING?: PATIENT DOES NOT DRINK

## 2024-10-01 SDOH — HEALTH STABILITY: MENTAL HEALTH
DO YOU FEEL STRESS - TENSE, RESTLESS, NERVOUS, OR ANXIOUS, OR UNABLE TO SLEEP AT NIGHT BECAUSE YOUR MIND IS TROUBLED ALL THE TIME - THESE DAYS?: NOT AT ALL

## 2024-10-01 SDOH — HEALTH STABILITY: MENTAL HEALTH: HOW MANY STANDARD DRINKS CONTAINING ALCOHOL DO YOU HAVE ON A TYPICAL DAY?: PATIENT DOES NOT DRINK

## 2024-10-01 SDOH — ECONOMIC STABILITY: FOOD INSECURITY: WITHIN THE PAST 12 MONTHS, YOU WORRIED THAT YOUR FOOD WOULD RUN OUT BEFORE YOU GOT THE MONEY TO BUY MORE.: NEVER TRUE

## 2024-10-01 SDOH — SOCIAL STABILITY: SOCIAL NETWORK: HOW OFTEN DO YOU ATTEND MEETINGS OF THE CLUBS OR ORGANIZATIONS YOU BELONG TO?: NEVER

## 2024-10-01 SDOH — ECONOMIC STABILITY: FOOD INSECURITY: HOW HARD IS IT FOR YOU TO PAY FOR THE VERY BASICS LIKE FOOD, HOUSING, MEDICAL CARE, AND HEATING?: NOT HARD AT ALL

## 2024-10-01 ASSESSMENT — ACTIVITIES OF DAILY LIVING (ADL)
BATHING_LEVEL_OF_ASSISTANCE: SETUP
LACK_OF_TRANSPORTATION: NO
BATHING_EQUIPMENT_NEEDED: REMOVEABLE SHOWER HEAD
BATHING_WHERE_ASSESSED: SHOWER
HOME_MANAGEMENT_TIME_ENTRY: 30

## 2024-10-01 ASSESSMENT — PAIN - FUNCTIONAL ASSESSMENT
PAIN_FUNCTIONAL_ASSESSMENT: 0-10

## 2024-10-01 ASSESSMENT — BRIEF INTERVIEW FOR MENTAL STATUS (BIMS)
ASKED TO RECALL BED: YES, NO CUE REQUIRED
WHAT DAY OF THE WEEK IS IT: CORRECT
BIMS SUMMARY SCORE: 15
COGNITIVE PATTERN ASSESSMENT USED: BIMS
ASKED TO RECALL SOCK: YES, NO CUE REQUIRED
WHAT YEAR IS IT: CORRECT
ASKED TO RECALL BLUE: YES, NO CUE REQUIRED
INITIAL REPETITION OF BED BLUE SOCK - FIRST ATTEMPT: 3
WHAT MONTH IS IT: ACCURATE WITHIN 5 DAYS

## 2024-10-01 ASSESSMENT — PAIN SCALES - GENERAL
PAINLEVEL_OUTOF10: 0 - NO PAIN

## 2024-10-01 ASSESSMENT — COGNITIVE AND FUNCTIONAL STATUS - GENERAL: DAILY ACTIVITIY SCORE: 24

## 2024-10-01 ASSESSMENT — PATIENT HEALTH QUESTIONNAIRE - PHQ9
1. LITTLE INTEREST OR PLEASURE IN DOING THINGS: NOT AT ALL
2. FEELING DOWN, DEPRESSED OR HOPELESS: NOT AT ALL
SUM OF ALL RESPONSES TO PHQ9 QUESTIONS 1 & 2: 0

## 2024-10-01 ASSESSMENT — LIFESTYLE VARIABLES
SKIP TO QUESTIONS 9-10: 1
AUDIT-C TOTAL SCORE: 0

## 2024-10-01 ASSESSMENT — COLUMBIA-SUICIDE SEVERITY RATING SCALE - C-SSRS
2. HAVE YOU ACTUALLY HAD ANY THOUGHTS OF KILLING YOURSELF?: NO
1. IN THE PAST MONTH, HAVE YOU WISHED YOU WERE DEAD OR WISHED YOU COULD GO TO SLEEP AND NOT WAKE UP?: NO
6. HAVE YOU EVER DONE ANYTHING, STARTED TO DO ANYTHING, OR PREPARED TO DO ANYTHING TO END YOUR LIFE?: NO

## 2024-10-01 ASSESSMENT — PAIN SCALES - WONG BAKER: WONGBAKER_NUMERICALRESPONSE: NO HURT

## 2024-10-01 NOTE — PROGRESS NOTES
Speech-Language Pathology    SLP Adult IR[facility] Speech-Language Pathology Treatment/Discharge Note     Patient Name: Lisbeth Cruz  MRN: [id number]  Today's Date: 10/1/2024  Time Calculation  Start Time: 1100  Stop Time: 1130  Time Calculation (min): 30 min     3/4sp    Current Problem:   Acute/subacute lacunar infarction in the right frontal lobe white matter. Patient also found to have UTI.     SLP Assessment:  Treatment Tolerance: Patient tolerated treatment well  Medical Staff Made Aware: Yes  Strengths: Family/Caregiver Support  Barriers: Comorbidities  Education Provided: Yes    Pt was seen for symbolic dysfunction. Pt and daughter are in agreement that pt is at a baseline mentation. STM/cognitive difficulties were primarily due to h/o chemo and recent UTI. Daughter assists with IADLs at home and plans to further assist with medication management. Pt and daughter had no further questions. All questions were addressed.    No further ST intervention indicated at this time.     Plan:  SLP TX Plan: Discharge from Speech Therapy  SLP Plan: No skilled SLP  No Skilled SLP: At baseline function    SLP Discharge Recommendations: Home with no further SLP    Discussed POC: Patient, Caregiver/family, Nursing  Discussed Risks/Benefits: Yes, Patient, Caregiver/Family, Nursing  Patient/Caregiver Agreeable: Yes  SLP - OK to Discharge: Yes    Subjective   Pt seen upright in wheelchair. Daughter is present for family training. Pt plans discharge home on next therapy day.     General Visit Information:   Reason for Referral: Follow-up speech-language treatment  Referred By: Dr. Griggs  Past Medical History Relevant to Rehab: anxiety, cardiomyopathy, HTN, dyspnea, chronic diastolic heart failure  Caregiver Feedback: daughter is present for family training  Prior to Session Communication: Bedside nurse    Pain Assessment:  Pain Assessment  0-10 (Numeric) Pain Score: 0 - No pain    Objective   SPEECH-LANGUAGE GOALS (established  9/26, anticipate end 2 weeks):  Pt will increase thought organization skills to 80% accuracy with cues to participate in higher level language tasks.  PROGRESS: Reviewed CLQT results. Pt and daughter are in agreement that pt is at a baseline mentation. Pt described h/o STM difficulties since chemo. Provided handout for STM strategies. Pt and daughter had no further questions.   STATUS: No longer clinically indicated at pt and daughter report baseline mentation.  2.  Pt will complete functional problem solving tasks with 80% acc in order to make decisions about care needs and participate safely in ADLs.  PROGRESS: Daughter assist with IADLs at home and plans to assist with managing medications.  STATUS: No longer clinically indicated at pt and daughter report baseline mentation.  3. Ongoing assessment if indicated for further goal development, as needed.  PROGRESS: Pt reported that speech is not at baseline. However, overall communication was clear. Provided handout for clear speech strategies. Provided education that not wearing dentures also affects speech intelligibility. Pt and daughter verbalized understanding. Pt sts dentures may need to be readjusted.   STATUS: Not clinically indicated at this time.    Inpatient Education:  Adult Inpatient Education  Individual(s) Educated: Patient, Child  Written Education : handouts for clear speech and memory strategies  Verbal Education : CLQT results, progress, goals  Risk and Benefits Discussed with Patient/Caregiver/Other: yes  Patient/Caregiver Demonstrated Understanding: yes  Plan of Care Discussed and Agreed Upon: yes  Patient Response to Education: Patient/Caregiver Verbalized Understanding of Information

## 2024-10-01 NOTE — PROGRESS NOTES
Occupational Therapy  DISCHARGE STATUS  Discharge Date:  10/01/2024  Reason for Discharge:  Pt has met max rehab potential at this level of care.  Upper Extremity Status    Left Right   Dominance  x   Gross Motor WFL WFL   Fine Motor WFL WFL   Balance   Static Dynamic   Sitting good good   Standing good good     Visual / Perceptual  Visual Acuity WFL   Visual Spatial WFL   Hearing WFL   Proprioception WFL   Praxis WFL   Memory WFL   Attention WFL   Rt/Lt Neglect WFL     Discharge Functional Status  Eating Independent   Grooming Modified independent   Toilet Hygiene Distant supervision   Shower/Bathe Upper:  Modified independent  Lower:  Setup   Upper Body Dress Setup   Lower Body Dress Setup   On/Off Footwear Socks:  Setup  Shoes:  Setup   Toilet Transfer Set up   Car Transfer Set up   Meal Preparation Distant supervision   Kitchen Mobility Distant supervision        [facility] Daily Activity  Putting on and taking off regular lower body clothing: A little  Bathing (including washing, rinsing, drying): A little  Putting on and taking off regular upper body clothing: A little  Toileting, which includes using toilet, bedpan or urinal: A little  Taking care of personal grooming such as brushing teeth: A little  Eating Meals: A little  Daily Activity - Total Score: 18     10/01/24 1330   OT Adult Other Outcome Measures   9 Hole Peg Test RUE:29 sec LUE: 36 sec   Box and Block RUE: 26 blocks LUE: 27 blocks   Other Outcome Measures BITs Bird Cancellation: 3:16 min, 1 miss, 0 distractors  ( Strength RUE: 47#   LUE: 45#)     Treatment Summary:  Family training completed and AE/DME recommended.  Pt ed on HEP; issued handout.  Goal Attainment: Partially met   Remaining Goals/Needs:  bathing, UB/LB dressing, home making, meal prep, mobility, functional transfers, toileting (adequate for discharge).  Equipment Recommendations:  bedside commode, RW, shower chair, walker basket, reacher  OT Plan: Skilled OT, HHC services then  transition to outpatient therapy services.  Pt plans to return home with daughter, son, and 2 grandchildren.

## 2024-10-01 NOTE — NURSING NOTE
Assumed care of patient at 0730. Patient here s/p right frontal lobe infarct. Generalized weakness slightly worse on the left. Mediport to right upper chest. Has meropenem 1 q Q12 for UTI. On RA. Daughter here for family training. On RA. Call light is within reach. Will continue to monitor.

## 2024-10-01 NOTE — PROGRESS NOTES
"[facility] Comprehensive Rehabilitation  Physician Progress Note    Subjective   Lisbeth Cruz is a 64 y.o. female admitted to inpatient rehabilitation unit.    The patient is doing well overall and expresses no particular concerns.  Participating well with therapy.  Eating well and the bowels are moving.  No chest pain or shortness of breath is reported.    Up in hallway w/ PT ambulating.    An interdisciplinary team meeting was held today.  I was in attendance and discussed this patient's functional progress with the physical and occupational therapist, as well as the speech therapist if they are involved in the patient's care.  Also present were representatives from the nursing staff and case management.  PLEASE REFER TO THE INTERDISCIPLINARY TEAM DOCUMENTATION FOR ADDITIONAL COMMENTS ON THE PATIENT'S CLINICAL AND FUNCTIONAL CONDITION.  I agree with the decisions made by the team.  The patient's medical and functional condition continue to require frequent physician visits, 24 hour rehabilitation nursing care and an interdisciplinary approach, such that this level of care could not be provided outside of an inpatient rehabilitation facility.  The patient's anticipated discharge plan and date was reviewed and approved by me.        Objective   /72 (BP Location: Right arm, Patient Position: Sitting)   Pulse 74   Temp 36.4 °C (97.5 °F) (Temporal)   Resp 16   Ht 1.55 m (5' 1.02\")   Wt 60.5 kg (133 lb 6.4 oz)   SpO2 100%   BMI 25.19 kg/m²      Physical Exam  Constitutional:       General: She is not in acute distress.     Appearance: She is not toxic-appearing.   HENT:      Head: Normocephalic and atraumatic.      Mouth/Throat:      Mouth: Mucous membranes are moist.   Eyes:      Pupils: Pupils are equal, round, and reactive to light.   Cardiovascular:      Rate and Rhythm: Normal rate and regular rhythm.      Heart sounds: No murmur heard.  Pulmonary:      Breath sounds: " Normal breath sounds. No wheezing, rhonchi or rales.   Abdominal:      General: There is no distension.      Palpations: Abdomen is soft.   Musculoskeletal:      Right lower leg: No edema.      Left lower leg: No edema.   Neurological:      General: No focal deficit present.      Mental Status: She is alert and oriented to person, place, and time.      Mild diffuse weakness in both upper and lower extremities but nonfocal on my exam.     The right chest port is intact and functional.    Labs  BMP:  Results from last 7 days   Lab Units 09/26/24  0406   CREATININE mg/dL 1.50*   BUN mg/dL 16   SODIUM mmol/L 140   POTASSIUM mmol/L 3.5   CHLORIDE mmol/L 110*   CO2 mmol/L 20*     CBC:  Results from last 7 days   Lab Units 09/26/24  0406   WBC AUTO x10*3/uL 4.3*   HEMOGLOBIN g/dL 9.8*   HEMATOCRIT % 30.5*   MCV fL 100   PLATELETS AUTO x10*3/uL 105*     Coagulation:       Assesment and Plan    Ischemic Stroke (Multi)   Multiple Myeloma Not Having Achieved Remission (Multi)   Stage 4 Chronic Kidney Disease (Multi)      Continue with the coordinated, interdisciplinary rehabilitation effort.  Continue medications for secondary prevention: DAPT x 21 days     Multiple myeloma on chemotherapy.     Stage IV chronic kidney disease, monitor renal function and electrolytes.  The serum creatinine has been improving fairly steadily over the past several days. Continue sodium bicarbonate. Recheck labs.     Pancytopenia.     Depression and anxiety, continue home medications.    Yamil Griggs MD

## 2024-10-01 NOTE — PROGRESS NOTES
Physical Therapy       10/01/24 [id number]   PT  Visit   PT Received On 10/01/24   Response to Previous Treatment Patient with no complaints from previous session.   General   Reason for Referral Patient is a 64 year old female who presented to the ED with c/o Left sided weakness and stroke like symptoms. MRI of the brain completed indicating: Acute/subacute lacunar infarction in the right frontal lobe white  matter measuring a proximally 3 mm in size. No hemorrhage or  measurable mass effect *No evidence of intracranial mass, extra-axial  collection or cerebral hemorrhage.Patient also found to have UTI.   Referred By Dr. Griggs   Past Medical History Relevant to Rehab anxiety, cardiomyopathy, HTN, dyspnea, chronic diastolic heart failure   Patient Position Received Up in chair   Preferred Learning Style verbal;visual   General Comment Pt agreeable to tx   Precautions   Medical Precautions Fall precautions   Pain Assessment   Pain Assessment 0-10   0-10 (Numeric) Pain Score 0 - No pain   Therapeutic Exercise   Therapeutic Exercise Activity 1 stnading heel raises, 2x15 reps   Therapeutic Exercise Activity 2 mini squats, 2x15 reps   Balance/Neuromuscular Re-Education   Balance/Neuromuscular Re-Education Activity 1 side stepping with orange theraband around knees, 2x12 feet each direction, no UE support, CGA   Ambulation/Gait Training 1   Surface 1 Level tile   Device 1 No device   Assistance 1 Close supervision   Quality of Gait 1 Diminished heel strike   Comments/Distance (ft) 1 150 ft, turns included   Transfer 1   Technique 1 Stand pivot   Transfer Device 1 Gait belt   Transfer Level of Assistance 1 Modified independent   Transfers 2   Technique 2 Sit to stand;Stand to sit   Transfer Device 2 Gait belt   Transfer Level of Assistance 2 Modified independent   Wheelchair Activities   Propulsion Type 1 Manual   Level 1 Level tile   Method 1 Right upper extremity;Left upper extremity;Right lower extremity;Left lower  extremity   Level of Assistance 1 Modified independent   Activity Tolerance   Endurance Endurance does not limit participation in activity   PT Assessment   PT Assessment Results Decreased strength;Decreased endurance;Impaired balance;Decreased mobility;Impaired judgement;Decreased safety awareness   Rehab Prognosis Good   End of Session Patient Position Up in chair   PT Plan   Inpatient/Swing Bed or Outpatient Inpatient   PT Plan   Treatment/Interventions Bed mobility;Transfer training;Gait training;Stair training;Neuromuscular re-education;Therapeutic exercise;Therapeutic activity   PT Plan Ongoing PT   Equipment Recommended upon Discharge Wheeled walker   PT Recommended Transfer Status Assist x1;Stand by assist

## 2024-10-01 NOTE — NURSING NOTE
Assumed care of patient at 1900, patient resting in bed with no complaints voiced. Call light in reach

## 2024-10-01 NOTE — CARE PLAN
Problem: Bathing  Goal: LTG - Patient will utilize adaptive techniques to bathe body with Mod I  Outcome: Not met     Problem: Dressings Lower Extremities  Goal: LTG - Patient will dress lower body with Mod I using AE as needed  Outcome: Not met     Problem: Dressing Upper Extremities  Goal: LTG - Patient will complete upper body dressing with [place]  Outcome: Not met     Problem: Grooming  Goal: LTG - Patient will complete daily grooming tasks with Mod I  Outcome: Met     Problem: Instrumental Activities of Daily Living  Goal: LTG - Patient will independently complete basic home making activities, using least restrictive device, to return to independent functioning at home  Outcome: Not met  Goal: LTG - Patient will complete simple meal preparation activities with Mod I using least restrictive device  Outcome: Not met     Problem: Functional Mobility  Goal: LTG - Patient will demonstrate safe kitchen mobility with Mod I using least restrictive device  Outcome: Not met     Problem: Toileting  Goal: LTG - Patient will complete daily toileting tasks with Mod I  Outcome: Not met     Problem: OT Transfers  Goal: LTG - Patient will transfer to car with Mod I using least restrictive device  Outcome: Not met  Goal: LTG - Patient will transfer to tub/shower with Mod I using least restrictive device  Outcome: Not met  Goal: Goal 1  Outcome: Not met     Problem: OT Goals  Goal: LTG - Patient will complete functional transfers with Mod I using least restrictive device  Outcome: Not met  Goal: LTG - Patient will increase B UE strength and FMC, as evidence by improvements in standardized tests, and increased independence with daily tasks  Outcome: Not met

## 2024-10-01 NOTE — CARE PLAN
The patient's goals for the shift include maintain safety    The clinical goals for the shift include maintain safety

## 2024-10-01 NOTE — PROGRESS NOTES
Physical Therapy Discharge Note       10/01/24 [id number]   PT  Visit   PT Received On 10/01/24   Response to Previous Treatment Patient with no complaints from previous session.   General   Reason for Referral Patient is a 64 year old female who presented to the ED with c/o Left sided weakness and stroke like symptoms. MRI of the brain completed indicating: Acute/subacute lacunar infarction in the right frontal lobe white  matter measuring a proximally 3 mm in size. No hemorrhage or  measurable mass effect *No evidence of intracranial mass, extra-axial  collection or cerebral hemorrhage.Patient also found to have UTI.   Referred By Dr. Griggs   Past Medical History Relevant to Rehab anxiety, cardiomyopathy, HTN, dyspnea, chronic diastolic heart failure   Patient Position Received Up in chair   Preferred Learning Style verbal;visual   General Comment Pt agreeable to tx   Precautions   Medical Precautions Fall precautions   Pain Assessment   Pain Assessment 0-10   0-10 (Numeric) Pain Score 0 - No pain   Strength RLE   R Hip Flexion 4/5   R Hip Extension 4/5   R Hip ABduction 4/5   R Hip ADduction 4+/5   R Knee Flexion 4/5   R Knee Extension 4+/5   R Ankle Dorsiflexion 4+/5   R Ankle Plantar Flexion 4+/5   Strength LLE   L Hip Flexion 4-/5   L Hip Extension 4-/5   L Hip ABduction 4-/5   L Hip ADduction 4/5   L Knee Flexion 4/5   L Knee Extension 4/5   L Ankle Dorsiflexion 4+/5   L Ankle Plantar Flexion 4+/5   Therapeutic Exercise   Therapeutic Exercise Performed   (HEP issued with green theraband and reviewed.)   Therapeutic Exercise Activity 1 supine heel slides, 1x15 reps   Therapeutic Exercise Activity 2 supine hip abduction, 1x15 reps   Therapeutic Exercise Activity 3 supine SLR, 1x8 reps   Therapeutic Exercise Activity 4 supine bridging 1x15 reps   Balance/Neuromuscular Re-Education   Balance/Neuromuscular Re-Education Activity 1 small hurdles forward, no UE support, CG/Valentin   Balance/Neuromuscular Re-Education  Activity 2 small hurdles sideways, no UE support, CG/Valentin   Balance/Neuromuscular Re-Education Activity 3 air ex balance beam, no UE support, x2 trials each direction,  CG/Valentin   Bed Mobility 1   Bed Mobility 1 Supine to sitting;Sitting to supine   Level of Assistance 1 Independent   Bed Mobility Comments 1 bed flat   Bed Mobility 2   Bed Mobility  2 Scooting   Level of Assistance 2 Independent   Ambulation/Gait Training 1   Surface 1 Level tile   Device 1 Rollator   Assistance 1 Modified independent   Quality of Gait 1 Diminished heel strike   Comments/Distance (ft) 1 60 ft, turns included.   Ambulation/Gait Training 2   Surface 2 Level tile   Device 2 No device   Assistance 2 Close supervision   Quality of Gait 2 NBOS;Diminished heel strike;Decreased step length;Shuffling gait;Foot sweep   Comments/Distance (ft) 2 150 ft, vc's for heel strike and WBOS.  Increased lateral sway noted, but no LOB.   Transfer 1   Transfer From 1 Wheelchair to;Mat to   Transfer to 1 Mat;Wheelchair   Technique 1 Stand pivot   Transfer Device 1 Gait belt   Transfer Level of Assistance 1 Modified independent   Trials/Comments 1 no device   Transfers 2   Technique 2 Sit to stand;Stand to sit   Transfer Device 2 Gait belt   Transfer Level of Assistance 2 Modified independent   Trials/Comments 2 no device   Transfers 3   Transfer From 3 Mat to;Wheelchair to   Transfer to 3 Wheelchair;Mat   Technique 3 Stand pivot   Transfer Device 3 Gait belt   Transfer Level of Assistance 3 Modified independent   Stairs   Rails 1 Bilateral   Device 1 Railing   Support Devices 1 Gait belt   Assistance 1 Modified independent   Comment/Number of Steps 1 up/down 1 flight, step-to pattern    Object From Floor   Devices No reacher;No device   Assist Level Modified independent   Comments small cone from floor   Wheelchair Activities   Propulsion Type 1 Manual   Level 1 Level tile   Method 1 Right upper extremity;Left upper extremity;Right lower  extremity;Left lower extremity   Level of Assistance 1 Modified independent   Description/Details 1 ad katherine around facility   Other Activity   Other Activity 1 Pt made independent at w/c level in room.   Activity Tolerance   Endurance Endurance does not limit participation in activity   PT Assessment   PT Assessment Results Decreased strength;Decreased endurance;Impaired balance;Decreased mobility;Impaired judgement;Decreased safety awareness   Rehab Prognosis Good   Assessment Comment Pt has met 10 out of 13 goals.  Daughter present for family training this AM with good results.  Pt is planning on HHPT with possible transition to OPPT.  Pt has been made independent in room at w/c level this date. PT NOTE: Daughter lives with patient and will provide needed supervision and assist. Issued HEP and thera band. Patient has 3 rollators and wheelchair at home. Recommend continued PT.    End of Session Patient Position Up in chair   Outpatient Education   Individual(s) Educated Patient;Child   Education Provided Body Mechanics;Fall Risk;Home Exercise Program;Home Safety   Risk and Benefits Discussed with Patient/Caregiver/Other yes   Patient/Caregiver Demonstrated Understanding yes   Plan of Care Discussed and Agreed Upon yes   Patient Response to Education Patient/Caregiver Verbalized Understanding of Information;Patient/Caregiver Performed Return Demonstration of Exercises/Activities;Patient/Caregiver Asked Appropriate Questions   Education Comment Reviewed HEP and practiced ambulation with and without AD, stair negotiation, safe home mobility.  Intermittent supervision recommended.   PT Plan   Inpatient/Swing Bed or Outpatient Inpatient   PT Plan   Treatment/Interventions Bed mobility;Transfer training;Gait training;Stair training;Neuromuscular re-education. Discharge home with spouse.   PT Plan Ongoing PT   Equipment Recommended upon Discharge Wheeled walker   PT Recommended Transfer Status Assist x1;Stand by assist                        Problem: IRF PT STG Problem  Goal: Patient will transfer sit to stand and stand to sit with setup assist to facilitate mobility.  Outcome: Met  Goal: Patient will transfer bed to chair and chair to bed with setup assist to facilitate mobility.  Outcome: Met  Goal: Patient will amb 125 feet no device including two turns on even surface with supervision assist to facilitate safe mobility.    Outcome: Met  Goal: Patient will negotiate 14 stairs with two rail(s) and setup} assist with no device for in home and community.  Outcome: Met  Goal: Patient will perform TUG with least restrictive assistive device in 25 seconds or less to promote mobility and reduce fall risk with dynamic standing tasks.   Outcome: Met  Goal: Patient will improve GARAY score to  41/56  to promote mobility and reduce fall risk with dynamic standing tasks.   Outcome: Met     Problem: IRF PT LTG Problem  Goal: Patient will transfer sit to stand and stand to sit with independent assist to facilitate mobility.  Outcome: Met  Goal: Patient will transfer bed to chair and chair to bed with independent assist to facilitate mobility.  Outcome: Met  Goal: Patient will amb 150+ feet no device including two turns on even, uneven surface with independent assist to facilitate safe mobility.    Outcome: Not met  Goal: Patient will negotiate 14 stairs with two rail(s) and independent} assist with no device for in home and community.  Outcome: Met  Goal: Patient will perform TUG with least restrictive assistive device in 20 seconds or less to promote mobility and reduce fall risk with dynamic standing tasks.   Outcome: Not met  Goal: Patient will improve GARAY score to  45/56  to promote mobility and reduce fall risk with dynamic standing tasks.   Outcome: Met  Goal: Patient will increase LLE strength to 4/5 to improve functional mobility.   Outcome: Not met

## 2024-10-01 NOTE — CARE PLAN
The patient's goals for the shift include maintain safety    The clinical goals for the shift include maintain safety    Over the shift, the patient did not make progress toward the following goals. Barriers to progression include impaired mobility. Recommendations to address these barriers include improve mobility.

## 2024-10-01 NOTE — PROGRESS NOTES
Occupational Therapy Note   10/01/24 [id number]   OT Last Visit   OT Received On 10/01/24   General   Reason for Referral Patient is a 64 year old female who presented to the ED with c/o Left sided weakness and stroke like symptoms. MRI of the brain completed indicating: Acute/subacute lacunar infarction in the right frontal lobe white  matter measuring a proximally 3 mm in size. No hemorrhage or  measurable mass effect *No evidence of intracranial mass, extra-axial  collection or cerebral hemorrhage.Patient also found to have UTI.   Referred By Dr. Griggs   Past Medical History Relevant to Rehab anxiety, cardiomyopathy, HTN, dyspnea, chronic diastolic heart failure   Missed Visit No   Family/Caregiver Present No   Prior to Session Communication Bedside nurse   Patient Position Received Up in chair   Preferred Learning Style verbal;visual   General Comment Pt agreeable to tx   Precautions   Hearing/Visual Limitations glasses, hearing WFL   Medical Precautions Fall precautions   Precautions Comment Port present cover with shower   Pain Assessment   Pain Assessment 0-10   0-10 (Numeric) Pain Score 0 - No pain   Davey-Diaz FACES Pain Rating 0   Clinical Progression Not changed   Patient's Stated Pain Goal No pain   Cognition   Overall Cognitive Status WFL   Orientation Level Oriented X4   RUE    RUE  WFL   LUE    LUE WFL   Therapeutic Exercise   Therapeutic Exercise Performed Yes   Therapeutic Exercise Activity 1 PREs with 1# hand weights 15 reps x 1 set  (pt has been issued HEP; performs with supervision)   Other Activity   Other Activity Performed Yes   Other Activity 1 see out come measures for standardized assessment results   IP OT Assessment   Evaluation/Treatment Tolerance Patient tolerated treatment well   Medical Staff Made Aware Yes   End of Session Communication Bedside nurse   End of Session Patient Position Up in chair   OT Assessment   OT Assessment Results Decreased ADL status;Decreased upper extremity  strength;Decreased safe judgment during ADL;Decreased endurance;Decreased functional mobility;Decreased gross motor control;Decreased IADLs   Strengths Ability to acquire knowledge   Barriers to Participation Comorbidities   Education   Individual(s) Educated Patient   Home Program AROM;Strengthening;Handout issued   Risk and Benefits Discussed with Patient/Caregiver/Other yes   Patient/Caregiver Demonstrated Understanding yes   Plan of Care Discussed and Agreed Upon yes   Patient Response to Education Patient/Caregiver Verbalized Understanding of Information;Patient/Caregiver Performed Return Demonstration of Exercises/Activities   Inpatient/Swing Bed or Outpatient   Inpatient/Swing Bed or Outpatient Inpatient   Inpatient Plan   Treatment Interventions UE strengthening/ROM;Endurance training;Patient/family training   OT Frequency 5 times per week   OT Discharge Recommendations Low intensity level of continued care   Equipment Recommended upon Discharge Wheeled walker;Other (comment)  [place] [place]   [place] Recommended Transfer Status Assist of 1   OT - OK to Discharge Yes      10/01/24 1330   OT Adult Other Outcome Measures   9 Hole Peg Test RUE:29 sec LUE: 36 sec   Box and Block RUE: 26 blocks LUE: 27 blocks   Other Outcome Measures BITs Bird Cancellation: 3:16 min, 1 miss, 0 distractors  ( Strength RUE: 47#   LUE: 45#)

## 2024-10-01 NOTE — CARE PLAN
Problem: IRF PT STG Problem  Goal: Patient will transfer sit to stand and stand to sit with setup assist to facilitate mobility.  Outcome: Met  Goal: Patient will transfer bed to chair and chair to bed with setup assist to facilitate mobility.  Outcome: Met  Goal: Patient will amb 125 feet no device including two turns on even surface with supervision assist to facilitate safe mobility.    Outcome: Met  Goal: Patient will negotiate 14 stairs with two rail(s) and setup} assist with no device for in home and community.  Outcome: Met  Goal: Patient will perform TUG with least restrictive assistive device in 25 seconds or less to promote mobility and reduce fall risk with dynamic standing tasks.   Outcome: Met  Goal: Patient will improve GARAY score to  41/56  to promote mobility and reduce fall risk with dynamic standing tasks.   Outcome: Met     Problem: IRF PT LTG Problem  Goal: Patient will transfer sit to stand and stand to sit with independent assist to facilitate mobility.  Outcome: Met  Goal: Patient will transfer bed to chair and chair to bed with independent assist to facilitate mobility.  Outcome: Met  Goal: Patient will amb 150+ feet no device including two turns on even, uneven surface with independent assist to facilitate safe mobility.    Outcome: Not met  Goal: Patient will negotiate 14 stairs with two rail(s) and independent} assist with no device for in home and community.  Outcome: Met  Goal: Patient will perform TUG with least restrictive assistive device in 20 seconds or less to promote mobility and reduce fall risk with dynamic standing tasks.   Outcome: Not met  Goal: Patient will improve GARAY score to  45/56  to promote mobility and reduce fall risk with dynamic standing tasks.   Outcome: Met  Goal: Patient will increase LLE strength to 4/5 to improve functional mobility.   Outcome: Not met

## 2024-10-01 NOTE — PROGRESS NOTES
Occupational Therapy Note and Family Training     10/01/24 [id number]   OT Last Visit   OT Received On 10/01/24   General   Reason for Referral Patient is a 64 year old female who presented to the ED with c/o Left sided weakness and stroke like symptoms. MRI of the brain completed indicating: Acute/subacute lacunar infarction in the right frontal lobe white  matter measuring a proximally 3 mm in size. No hemorrhage or  measurable mass effect *No evidence of intracranial mass, extra-axial  collection or cerebral hemorrhage.Patient also found to have UTI.   Referred By Dr. Griggs   Past Medical History Relevant to Rehab anxiety, cardiomyopathy, HTN, dyspnea, chronic diastolic heart failure   Missed Visit No   Family/Caregiver Present Yes   Caregiver Feedback pts daughter present for family training   Prior to Session Communication Bedside nurse   Patient Position Received Bed, 2 rail up   Preferred Learning Style verbal;visual   General Comment Pt agreeable to tx   Precautions   Hearing/Visual Limitations glasses, hearing WFL   Medical Precautions Fall precautions   Precautions Comment Port present cover with shower   Pain Assessment   Pain Assessment 0-10   0-10 (Numeric) Pain Score 0 - No pain   Clinical Progression Not changed   Patient's Stated Pain Goal No pain   Cognition   Overall Cognitive Status WFL   Orientation Level Oriented X4   Coordination   Movements are Fluid and Coordinated Yes   RUE    RUE  WFL   LUE    LUE WFL   Feeding   Feeding Level of Assistance Independent   Feeding Where Assessed Wheelchair   Feeding Comments able to feed self including set up   Grooming   Grooming Level of Assistance Modified independent   Grooming Where Assessed Wheelchair;Sitting sinkside   Grooming Comments items placed within reach to brush hair and brush teeth   UE Bathing   UE Bathing Adaptive Equipment Removeable shower head   UE Bathing Level of Assistance Modified independent   UE Bathing Where Assessed Shower   UE  Bathing Comments all task seated   LE Bathing   LE Bathing Adaptive Equipment Removeable shower head   LE Bathing Level of Assistance Setup   LE Bathing Where Assessed Shower   LE Bathing Comments figure 4 to wash feet and in stance at grab bar for posterior care   UE Dressing   UE Dressing Level of Assistance Setup   UE Dressing Where Assessed Wheelchair   UE Dressing Comments to fede/doff dress seated   LE Dressing   LE Dressing Yes   Pants Level of Assistance Setup   Sock Level of Assistance Setup   Shoe Level of Assistance Setup   LE Dressing Where Assessed Wheelchair   LE Dressing Comments Pt doff/don LB clothing with efficiency figure four technique   Functional Standing Tolerance   Time ~4 min   Activity self care   Functional Standing Tolerance Comments good balance   Bed Mobility   Bed Mobility Yes   Bed Mobility 1   Bed Mobility 1 Supine to sitting;Sitting to supine   Level of Assistance 1 Independent   Bed Mobility Comments 1 flat bed   Functional Mobility 1   Surface 1 Level tile   Device 1 No device   Assistance 1 Distant supervision   Comments 1 15' x 2 to include doorway, turns and backing   Transfers   Transfer Yes   Static Sitting Balance   Static Sitting-Balance Support Feet supported;No upper extremity supported   Static Sitting-Level of Assistance Distant supervision   Static Sitting-Comment/Number of Minutes at EOB   Dynamic Sitting Balance   Dynamic Sitting-Balance Support Feet supported;No upper extremity supported   Dynamic Sitting-Level of Assistance Distant supervision   Dynamic Sitting-Balance Reaching for objects;Reaching across midline   Dynamic Sitting-Comments during ADL tasks   Dynamic Standing Balance   Dynamic Standing-Balance Support No upper extremity supported   Dynamic Standing-Level of Assistance Distant supervision   Dynamic Standing-Balance Forward lean;Reaching for objects;Reaching across midline   Dynamic Standing-Comments during ADL tasks   Shower Transfers   Shower  Transfer From Other (Comment)  (no device)   Shower Transfer Type To and from   Shower Transfer to Shower seat with back   Shower Transfer Technique Ambulating;To right;To left   Shower Transfers Set up   Shower Transfers Comments grab bar use   Car Transfers   Car Transfer From Wheelchair   Car Transfer Technique To left;To right   Car Transfers Set up   Car Transfers Comments actual car transfer; daughter present   IP OT Assessment   OT Assessment Pt is making excellent progress towards established goals.  Daughter present during ADL treatment session to observe pts functional progress.  Pt performs ADLs at an I to supervision level.  Recommended AE/DME including: reacher, RW, walker basket, shower chair, BSC, and grab bar by toilet as she already has one in the shower.  Pt performed a car transfer into family SUV utilizing proper technique from  with proper technique. Daughter will be home with pt as she is on disability and does not work.  Recommend [facility] services upon DC home.   Continue OT per POC to address remaining goals and begin DC planning.  Handouts issued to pt/daughter re: AE/DME and community resources.   Prognosis Excellent   Barriers to Discharge None   Evaluation/Treatment Tolerance Patient tolerated treatment well   Medical Staff Made Aware Yes   End of Session Communication Bedside nurse   End of Session Patient Position Up in chair  (pt transported to PT gym)   OT Assessment   OT Assessment Results Decreased ADL status;Decreased upper extremity strength;Decreased safe judgment during ADL;Decreased endurance;Decreased functional mobility;Decreased gross motor control;Decreased IADLs   Strengths Ability to acquire knowledge   Barriers to Participation Comorbidities   Education   Individual(s) Educated Patient   Education Provided Fall precautons;Risk and benefits of OT discussed with patient or other;POC discussed and agreed upon   Equipment Other  (RW, BSC, grab bar by toilet, walker basket,  reacher)   [facility] and [facility] for grab bar installation   Risk and Benefits Discussed with Patient/Caregiver/Other yes   Patient/Caregiver Demonstrated Understanding yes   Plan of Care Discussed and Agreed Upon yes   Patient Response to Education Patient/Caregiver Verbalized Understanding of Information;Patient/Caregiver Performed Return Demonstration of Exercises/Activities   Inpatient/Swing Bed or Outpatient   Inpatient/Swing Bed or Outpatient Inpatient   Inpatient Plan   Treatment Interventions ADL retraining;Functional transfer training;Endurance training;Patient/family training;Equipment evaluation/education;Compensatory technique education   OT Frequency 5 times per week   OT Discharge Recommendations Low intensity level of continued care   Equipment Recommended upon Discharge Wheeled walker   OT Recommended Transfer Status Assist of 1   OT - OK to Discharge Yes

## 2024-10-02 ENCOUNTER — PHARMACY VISIT (OUTPATIENT)
Dept: PHARMACY | Facility: CLINIC | Age: 64
End: 2024-10-02
Payer: COMMERCIAL

## 2024-10-02 VITALS
HEIGHT: 61 IN | HEART RATE: 75 BPM | TEMPERATURE: 98.1 F | WEIGHT: 133.4 LBS | SYSTOLIC BLOOD PRESSURE: 104 MMHG | BODY MASS INDEX: 25.19 KG/M2 | RESPIRATION RATE: 16 BRPM | DIASTOLIC BLOOD PRESSURE: 67 MMHG | OXYGEN SATURATION: 100 %

## 2024-10-02 PROCEDURE — 2500000001 HC RX 250 WO HCPCS SELF ADMINISTERED DRUGS (ALT 637 FOR MEDICARE OP): Performed by: INTERNAL MEDICINE

## 2024-10-02 PROCEDURE — 2500000002 HC RX 250 W HCPCS SELF ADMINISTERED DRUGS (ALT 637 FOR MEDICARE OP, ALT 636 FOR OP/ED): Performed by: INTERNAL MEDICINE

## 2024-10-02 PROCEDURE — 99238 HOSP IP/OBS DSCHRG MGMT 30/<: CPT | Performed by: INTERNAL MEDICINE

## 2024-10-02 PROCEDURE — 2500000004 HC RX 250 GENERAL PHARMACY W/ HCPCS (ALT 636 FOR OP/ED): Performed by: INTERNAL MEDICINE

## 2024-10-02 ASSESSMENT — PAIN SCALES - GENERAL
PAINLEVEL_OUTOF10: 0 - NO PAIN
PAINLEVEL_OUTOF10: 0 - NO PAIN

## 2024-10-02 ASSESSMENT — PAIN - FUNCTIONAL ASSESSMENT
PAIN_FUNCTIONAL_ASSESSMENT: 0-10
PAIN_FUNCTIONAL_ASSESSMENT: 0-10

## 2024-10-02 NOTE — PROGRESS NOTES
Care transitions met with patient and daughter, Diana, and discussed discharge needs. At this time Diana stated that they have 3 rollators and a wheelchair at home already. Diana has a prescription for a motorized wheelchair due to Diana is unable to push the standard wheelchair due to her own medical conditions. Diana informed that unfortunately with insurance patient needs to meet medical necessity for insurance to cover the motorized wheelchair. At this time here at [facility] acute rehab patient is quite high functioning and is not meeting necessity for the chair Diana informed and understands. Patient and Diana agreeable to [facility] at discharge. Diana currently receiving services from [facility] team and would prefer their services. Referral to be sent to [facility] team via [email or web address].     ADDENDUM: 10/01/2024 at 1530    PT stating that patient should be issued a front wheeled walker at discharge. PT informed that if issued it may inhibit patient from being able to get motorized wheelchair covered if and when patient would meet medical necessity.

## 2024-10-02 NOTE — PROGRESS NOTES
Referral sent to [facility] team via [facility] at this time for SN/PT/OT. All appropriate documents attached. No PCP on file. Care transitions to reach out to patient daughter due to patient uncertain of PCP name. Awaiting response.     ADDENDUM: 10/02/2024 at 0955    Care transitions went back in patient records and patient sees Paulina Strong CNP with the [facility]. [facility] team informed. Awaiting teams response.    ADDENDUM: 10/02/2024 at 1030    Care transitions checked [employer]port and [facility] team able to accept. Will inform patient and daughter.    ADDENDUM: 10/02/2024 at 1200    Attached dc summary in [employer]port.     ADDENDUM: 10/02/2024 at 1230    [facility] team informed patient actively discharged. Awaiting SOC.     ADDENDUM: 10/02/2024 at 1320    Care transitions checked Careport and SOC will be tomorrow 10/03. Team already in touch with patient.

## 2024-10-02 NOTE — NURSING NOTE
Assumed care of patient at 1900, patient noted to be resting in bed with call light in reach. No complaints are voiced at this time.

## 2024-10-02 NOTE — DISCHARGE SUMMARY
Discharge Diagnosis  Ischemic stroke (Multi)    Issues Requiring Follow-Up  To see neurology and cardiology    Test Results Pending At Discharge  Pending Labs       No current pending labs.            Hospital Course   This is a 64-year-old woman with a history of multiple myeloma and stage IV chronic kidney disease who presented with weakness, seems to be more on the right. She was found to have right frontal lobe stroke. There was some question about a history of atrial fibrillation but that could not be confirmed and Plavix was added to the aspirin after consultation with neurology and cardiology. Zio patch to be placed and follow-up with cardiology. She is currently on chemotherapy for her multiple myeloma. She is up and ambulatory, using the walker and improving rapidly  The patient was admitted to the inpatient rehabilitation unit and received intensive physical therapy, occupational therapy and rehabilitation nursing care.  The patient made strides toward functional independence and at the time of discharge was at a supervised to modified independent level with mobility, transfers and ADLs.  Home health care was arranged.  The patient will be following up with the providers as outlined in the discharge instructions.  Discharge medications were reconciled in detail and the patient and family were educated on appropriate medication use.  Prescriptions were provided either in hardcopy or electronic format.  The clinical condition was stable at the time of discharge.      Pertinent Physical Exam At Time of Discharge  Physical Exam  Constitutional:       General: She is not in acute distress.     Appearance: She is not toxic-appearing.   HENT:      Head: Normocephalic and atraumatic.      Mouth/Throat:      Mouth: Mucous membranes are moist.   Eyes:      Pupils: Pupils are equal, round, and reactive to light.   Cardiovascular:      Rate and Rhythm: Normal rate and regular rhythm.      Heart sounds: No murmur  heard.  Pulmonary:      Breath sounds: Normal breath sounds. No wheezing, rhonchi or rales.   Abdominal:      General: There is no distension.      Palpations: Abdomen is soft.   Musculoskeletal:      Right lower leg: No edema.      Left lower leg: No edema.   Neurological:      General: No focal deficit present.      Mental Status: She is alert and oriented to person, place, and time.      Mild diffuse weakness in both upper and lower extremities but nonfocal on my exam.     The right chest port is intact and functional.    Home Medications     Medication List      CONTINUE taking these medications     acyclovir 200 mg capsule; Commonly known as: Zovirax   aspirin 81 mg EC tablet; Take 1 tablet (81 mg) by mouth once daily.   atorvastatin 40 mg tablet; Commonly known as: Lipitor; Take 1 tablet (40   mg) by mouth once daily.   buPROPion  mg 12 hr tablet; Commonly known as: Wellbutrin SR   calcium carbonate 260 mg calcium (650 mg) tablet   cholecalciferol 25 MCG (1000 UT) capsule; Commonly known as: Vitamin D-3   clopidogrel 75 mg tablet; Commonly known as: Plavix; Take 1 tablet (75   mg) by mouth once daily for 30 doses.   cyanocobalamin 1,000 mcg tablet; Commonly known as: Vitamin B-12   * OLANZapine 5 mg tablet; Commonly known as: ZyPREXA   * OLANZapine 2.5 mg tablet; Commonly known as: ZyPREXA; Take 1 tablet   (2.5 mg) by mouth once daily at bedtime.   sertraline 100 mg tablet; Commonly known as: Zoloft   sodium bicarbonate 650 mg tablet   traZODone 100 mg tablet; Commonly known as: Desyrel  * This list has 2 medication(s) that are the same as other medications   prescribed for you. Read the directions carefully, and ask your doctor or   other care provider to review them with you.     STOP taking these medications     hydrALAZINE 25 mg tablet; Commonly known as: Apresoline   loperamide 1 mg/7.5 mL liquid; Commonly known as: Imodium A-D   meropenem 1 g in sodium chloride 0.9% 250 mL IVPB       Outpatient  Follow-Up  No future appointments.    Yamil Griggs MD